# Patient Record
Sex: FEMALE | Race: WHITE | Employment: OTHER | URBAN - METROPOLITAN AREA
[De-identification: names, ages, dates, MRNs, and addresses within clinical notes are randomized per-mention and may not be internally consistent; named-entity substitution may affect disease eponyms.]

---

## 2017-10-11 ENCOUNTER — APPOINTMENT (OUTPATIENT)
Dept: GENERAL RADIOLOGY | Facility: CLINIC | Age: 82
DRG: 605 | End: 2017-10-11
Attending: EMERGENCY MEDICINE
Payer: MEDICARE

## 2017-10-11 ENCOUNTER — HOSPITAL ENCOUNTER (INPATIENT)
Facility: CLINIC | Age: 82
LOS: 3 days | Discharge: NURSING, REHAB OR LONG-TERM CARE | DRG: 605 | End: 2017-10-14
Attending: EMERGENCY MEDICINE | Admitting: HOSPITALIST
Payer: MEDICARE

## 2017-10-11 ENCOUNTER — APPOINTMENT (OUTPATIENT)
Dept: CT IMAGING | Facility: CLINIC | Age: 82
DRG: 605 | End: 2017-10-11
Attending: EMERGENCY MEDICINE
Payer: MEDICARE

## 2017-10-11 ENCOUNTER — APPOINTMENT (OUTPATIENT)
Dept: OCCUPATIONAL THERAPY | Facility: CLINIC | Age: 82
DRG: 605 | End: 2017-10-11
Attending: ORTHOPAEDIC SURGERY
Payer: MEDICARE

## 2017-10-11 ENCOUNTER — APPOINTMENT (OUTPATIENT)
Dept: PHYSICAL THERAPY | Facility: CLINIC | Age: 82
DRG: 605 | End: 2017-10-11
Attending: ORTHOPAEDIC SURGERY
Payer: MEDICARE

## 2017-10-11 DIAGNOSIS — R11.2 NON-INTRACTABLE VOMITING WITH NAUSEA, UNSPECIFIED VOMITING TYPE: ICD-10-CM

## 2017-10-11 DIAGNOSIS — S70.01XA CONTUSION OF RIGHT HIP, INITIAL ENCOUNTER: ICD-10-CM

## 2017-10-11 DIAGNOSIS — M25.559 HIP PAIN: ICD-10-CM
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
## 2017-10-11 DIAGNOSIS — M25.531 RIGHT WRIST PAIN: ICD-10-CM

## 2017-10-11 LAB
ALBUMIN SERPL-MCNC: 3.1 G/DL (ref 3.4–5)
ALP SERPL-CCNC: 49 U/L (ref 40–150)
ALT SERPL W P-5'-P-CCNC: 21 U/L (ref 0–50)
ANION GAP SERPL CALCULATED.3IONS-SCNC: 8 MMOL/L (ref 3–14)
ANION GAP SERPL CALCULATED.3IONS-SCNC: 8 MMOL/L (ref 3–14)
AST SERPL W P-5'-P-CCNC: 22 U/L (ref 0–45)
BASOPHILS # BLD AUTO: 0 10E9/L (ref 0–0.2)
BASOPHILS NFR BLD AUTO: 0.2 %
BILIRUB SERPL-MCNC: 0.4 MG/DL (ref 0.2–1.3)
BUN SERPL-MCNC: 21 MG/DL (ref 7–30)
BUN SERPL-MCNC: 25 MG/DL (ref 7–30)
CALCIUM SERPL-MCNC: 7.6 MG/DL (ref 8.5–10.1)
CALCIUM SERPL-MCNC: 7.9 MG/DL (ref 8.5–10.1)
CHLORIDE SERPL-SCNC: 104 MMOL/L (ref 94–109)
CHLORIDE SERPL-SCNC: 106 MMOL/L (ref 94–109)
CO2 SERPL-SCNC: 25 MMOL/L (ref 20–32)
CO2 SERPL-SCNC: 29 MMOL/L (ref 20–32)
CREAT SERPL-MCNC: 1.19 MG/DL (ref 0.52–1.04)
CREAT SERPL-MCNC: 1.2 MG/DL (ref 0.52–1.04)
DIFFERENTIAL METHOD BLD: ABNORMAL
EOSINOPHIL # BLD AUTO: 0.2 10E9/L (ref 0–0.7)
EOSINOPHIL NFR BLD AUTO: 4.7 %
ERYTHROCYTE [DISTWIDTH] IN BLOOD BY AUTOMATED COUNT: 13.5 % (ref 10–15)
GFR SERPL CREATININE-BSD FRML MDRD: 42 ML/MIN/1.7M2
GFR SERPL CREATININE-BSD FRML MDRD: 43 ML/MIN/1.7M2
GLUCOSE BLDC GLUCOMTR-MCNC: 184 MG/DL (ref 70–99)
GLUCOSE BLDC GLUCOMTR-MCNC: 231 MG/DL (ref 70–99)
GLUCOSE BLDC GLUCOMTR-MCNC: 291 MG/DL (ref 70–99)
GLUCOSE BLDC GLUCOMTR-MCNC: 293 MG/DL (ref 70–99)
GLUCOSE SERPL-MCNC: 148 MG/DL (ref 70–99)
GLUCOSE SERPL-MCNC: 330 MG/DL (ref 70–99)
HCT VFR BLD AUTO: 25.2 % (ref 35–47)
HCT VFR BLD AUTO: 27.6 % (ref 35–47)
HCT VFR BLD AUTO: 28.3 % (ref 35–47)
HCT VFR BLD AUTO: 38.2 % (ref 35–47)
HGB BLD-MCNC: 12.6 G/DL (ref 11.7–15.7)
HGB BLD-MCNC: 8.4 G/DL (ref 11.7–15.7)
HGB BLD-MCNC: 9.1 G/DL (ref 11.7–15.7)
HGB BLD-MCNC: 9.5 G/DL (ref 11.7–15.7)
IMM GRANULOCYTES # BLD: 0 10E9/L (ref 0–0.4)
IMM GRANULOCYTES NFR BLD: 0.2 %
INTERPRETATION ECG - MUSE: NORMAL
LIPASE SERPL-CCNC: 131 U/L (ref 73–393)
LYMPHOCYTES # BLD AUTO: 0.9 10E9/L (ref 0.8–5.3)
LYMPHOCYTES NFR BLD AUTO: 18.4 %
MCH RBC QN AUTO: 28.8 PG (ref 26.5–33)
MCHC RBC AUTO-ENTMCNC: 33 G/DL (ref 31.5–36.5)
MCV RBC AUTO: 87 FL (ref 78–100)
MONOCYTES # BLD AUTO: 0.6 10E9/L (ref 0–1.3)
MONOCYTES NFR BLD AUTO: 12.9 %
NEUTROPHILS # BLD AUTO: 3.1 10E9/L (ref 1.6–8.3)
NEUTROPHILS NFR BLD AUTO: 63.6 %
NRBC # BLD AUTO: 0 10*3/UL
NRBC BLD AUTO-RTO: 0 /100
PLATELET # BLD AUTO: 135 10E9/L (ref 150–450)
POTASSIUM SERPL-SCNC: 3.8 MMOL/L (ref 3.4–5.3)
POTASSIUM SERPL-SCNC: 4.6 MMOL/L (ref 3.4–5.3)
PROT SERPL-MCNC: 6 G/DL (ref 6.8–8.8)
RBC # BLD AUTO: 4.37 10E12/L (ref 3.8–5.2)
SODIUM SERPL-SCNC: 139 MMOL/L (ref 133–144)
SODIUM SERPL-SCNC: 141 MMOL/L (ref 133–144)
TROPONIN I SERPL-MCNC: <0.015 UG/L (ref 0–0.04)
WBC # BLD AUTO: 4.9 10E9/L (ref 4–11)

## 2017-10-11 PROCEDURE — 40000193 ZZH STATISTIC PT WARD VISIT: Performed by: PHYSICAL THERAPIST

## 2017-10-11 PROCEDURE — 83690 ASSAY OF LIPASE: CPT | Performed by: EMERGENCY MEDICINE

## 2017-10-11 PROCEDURE — A9270 NON-COVERED ITEM OR SERVICE: HCPCS | Mod: GY | Performed by: HOSPITALIST

## 2017-10-11 PROCEDURE — 99207 ZZC CDG-HISTORY COMP: MEETS EXP. PROBLEM FOCUSED-DOWN CODED LACK OF ROS: CPT | Performed by: HOSPITALIST

## 2017-10-11 PROCEDURE — 25000125 ZZHC RX 250: Performed by: HOSPITALIST

## 2017-10-11 PROCEDURE — 99221 1ST HOSP IP/OBS SF/LOW 40: CPT | Mod: AI | Performed by: HOSPITALIST

## 2017-10-11 PROCEDURE — 86900 BLOOD TYPING SEROLOGIC ABO: CPT | Performed by: HOSPITALIST

## 2017-10-11 PROCEDURE — 85014 HEMATOCRIT: CPT | Performed by: HOSPITALIST

## 2017-10-11 PROCEDURE — 80048 BASIC METABOLIC PNL TOTAL CA: CPT | Performed by: FAMILY MEDICINE

## 2017-10-11 PROCEDURE — 93005 ELECTROCARDIOGRAM TRACING: CPT

## 2017-10-11 PROCEDURE — 97166 OT EVAL MOD COMPLEX 45 MIN: CPT | Mod: GO

## 2017-10-11 PROCEDURE — 70450 CT HEAD/BRAIN W/O DYE: CPT

## 2017-10-11 PROCEDURE — 97535 SELF CARE MNGMENT TRAINING: CPT | Mod: GO

## 2017-10-11 PROCEDURE — 25000128 H RX IP 250 OP 636: Performed by: HOSPITALIST

## 2017-10-11 PROCEDURE — 73700 CT LOWER EXTREMITY W/O DYE: CPT | Mod: RT

## 2017-10-11 PROCEDURE — 96376 TX/PRO/DX INJ SAME DRUG ADON: CPT

## 2017-10-11 PROCEDURE — 86850 RBC ANTIBODY SCREEN: CPT | Performed by: HOSPITALIST

## 2017-10-11 PROCEDURE — 40000133 ZZH STATISTIC OT WARD VISIT

## 2017-10-11 PROCEDURE — 93010 ELECTROCARDIOGRAM REPORT: CPT | Performed by: INTERNAL MEDICINE

## 2017-10-11 PROCEDURE — 00000146 ZZHCL STATISTIC GLUCOSE BY METER IP

## 2017-10-11 PROCEDURE — 25000128 H RX IP 250 OP 636: Performed by: EMERGENCY MEDICINE

## 2017-10-11 PROCEDURE — 96374 THER/PROPH/DIAG INJ IV PUSH: CPT

## 2017-10-11 PROCEDURE — 85018 HEMOGLOBIN: CPT | Performed by: HOSPITALIST

## 2017-10-11 PROCEDURE — 25000131 ZZH RX MED GY IP 250 OP 636 PS 637: Mod: GY | Performed by: HOSPITALIST

## 2017-10-11 PROCEDURE — 25000132 ZZH RX MED GY IP 250 OP 250 PS 637: Mod: GY | Performed by: HOSPITALIST

## 2017-10-11 PROCEDURE — 85025 COMPLETE CBC W/AUTO DIFF WBC: CPT | Performed by: EMERGENCY MEDICINE

## 2017-10-11 PROCEDURE — 73502 X-RAY EXAM HIP UNI 2-3 VIEWS: CPT

## 2017-10-11 PROCEDURE — 86923 COMPATIBILITY TEST ELECTRIC: CPT | Performed by: HOSPITALIST

## 2017-10-11 PROCEDURE — 25000128 H RX IP 250 OP 636: Performed by: INTERNAL MEDICINE

## 2017-10-11 PROCEDURE — 12000007 ZZH R&B INTERMEDIATE

## 2017-10-11 PROCEDURE — 36415 COLL VENOUS BLD VENIPUNCTURE: CPT | Performed by: HOSPITALIST

## 2017-10-11 PROCEDURE — 80053 COMPREHEN METABOLIC PANEL: CPT | Performed by: EMERGENCY MEDICINE

## 2017-10-11 PROCEDURE — 40000141 ZZH STATISTIC PERIPHERAL IV START W/O US GUIDANCE

## 2017-10-11 PROCEDURE — 96375 TX/PRO/DX INJ NEW DRUG ADDON: CPT

## 2017-10-11 PROCEDURE — 97530 THERAPEUTIC ACTIVITIES: CPT | Mod: GO

## 2017-10-11 PROCEDURE — 84484 ASSAY OF TROPONIN QUANT: CPT | Performed by: EMERGENCY MEDICINE

## 2017-10-11 PROCEDURE — 97530 THERAPEUTIC ACTIVITIES: CPT | Mod: GP | Performed by: PHYSICAL THERAPIST

## 2017-10-11 PROCEDURE — 99285 EMERGENCY DEPT VISIT HI MDM: CPT | Mod: 25

## 2017-10-11 PROCEDURE — 99207 ZZC APP CREDIT; MD BILLING SHARED VISIT: CPT | Performed by: HOSPITALIST

## 2017-10-11 PROCEDURE — 96361 HYDRATE IV INFUSION ADD-ON: CPT

## 2017-10-11 PROCEDURE — 97161 PT EVAL LOW COMPLEX 20 MIN: CPT | Mod: GP | Performed by: PHYSICAL THERAPIST

## 2017-10-11 PROCEDURE — 86901 BLOOD TYPING SEROLOGIC RH(D): CPT | Performed by: HOSPITALIST

## 2017-10-11 PROCEDURE — 73110 X-RAY EXAM OF WRIST: CPT | Mod: RT

## 2017-10-11 RX ORDER — NALOXONE HYDROCHLORIDE 0.4 MG/ML
.1-.4 INJECTION, SOLUTION INTRAMUSCULAR; INTRAVENOUS; SUBCUTANEOUS
Status: DISCONTINUED | OUTPATIENT
Start: 2017-10-11 | End: 2017-10-14 | Stop reason: HOSPADM

## 2017-10-11 RX ORDER — POTASSIUM CHLORIDE 1500 MG/1
20-40 TABLET, EXTENDED RELEASE ORAL
Status: DISCONTINUED | OUTPATIENT
Start: 2017-10-11 | End: 2017-10-14 | Stop reason: HOSPADM

## 2017-10-11 RX ORDER — DEXTROSE MONOHYDRATE 25 G/50ML
25-50 INJECTION, SOLUTION INTRAVENOUS
Status: DISCONTINUED | OUTPATIENT
Start: 2017-10-11 | End: 2017-10-12

## 2017-10-11 RX ORDER — ONDANSETRON 2 MG/ML
4 INJECTION INTRAMUSCULAR; INTRAVENOUS ONCE
Status: COMPLETED | OUTPATIENT
Start: 2017-10-11 | End: 2017-10-11

## 2017-10-11 RX ORDER — NICOTINE POLACRILEX 4 MG
15-30 LOZENGE BUCCAL
Status: DISCONTINUED | OUTPATIENT
Start: 2017-10-11 | End: 2017-10-12

## 2017-10-11 RX ORDER — ACETAMINOPHEN 325 MG/1
650 TABLET ORAL EVERY 4 HOURS PRN
Status: DISCONTINUED | OUTPATIENT
Start: 2017-10-11 | End: 2017-10-14 | Stop reason: HOSPADM

## 2017-10-11 RX ORDER — POLYETHYLENE GLYCOL 3350 17 G/17G
17 POWDER, FOR SOLUTION ORAL DAILY PRN
Status: DISCONTINUED | OUTPATIENT
Start: 2017-10-11 | End: 2017-10-14 | Stop reason: HOSPADM

## 2017-10-11 RX ORDER — ACETAMINOPHEN 650 MG/1
650 SUPPOSITORY RECTAL EVERY 4 HOURS PRN
Status: DISCONTINUED | OUTPATIENT
Start: 2017-10-11 | End: 2017-10-14 | Stop reason: HOSPADM

## 2017-10-11 RX ORDER — CARBOXYMETHYLCELLULOSE SODIUM 5 MG/ML
1 SOLUTION/ DROPS OPHTHALMIC 2 TIMES DAILY
Status: DISCONTINUED | OUTPATIENT
Start: 2017-10-11 | End: 2017-10-14 | Stop reason: HOSPADM

## 2017-10-11 RX ORDER — LANOLIN ALCOHOL/MO/W.PET/CERES
1000 CREAM (GRAM) TOPICAL
Status: DISCONTINUED | OUTPATIENT
Start: 2017-10-11 | End: 2017-10-14 | Stop reason: HOSPADM

## 2017-10-11 RX ORDER — MORPHINE SULFATE 4 MG/ML
4 INJECTION, SOLUTION INTRAMUSCULAR; INTRAVENOUS
Status: DISCONTINUED | OUTPATIENT
Start: 2017-10-11 | End: 2017-10-11

## 2017-10-11 RX ORDER — PROCHLORPERAZINE MALEATE 5 MG
5 TABLET ORAL EVERY 6 HOURS PRN
Status: DISCONTINUED | OUTPATIENT
Start: 2017-10-11 | End: 2017-10-14 | Stop reason: HOSPADM

## 2017-10-11 RX ORDER — POTASSIUM CHLORIDE 7.45 MG/ML
10 INJECTION INTRAVENOUS
Status: DISCONTINUED | OUTPATIENT
Start: 2017-10-11 | End: 2017-10-14 | Stop reason: HOSPADM

## 2017-10-11 RX ORDER — ONDANSETRON 4 MG/1
4 TABLET, ORALLY DISINTEGRATING ORAL EVERY 6 HOURS PRN
Status: DISCONTINUED | OUTPATIENT
Start: 2017-10-11 | End: 2017-10-14 | Stop reason: HOSPADM

## 2017-10-11 RX ORDER — MULTIPLE VITAMINS W/ MINERALS TAB 9MG-400MCG
1 TAB ORAL
Status: DISCONTINUED | OUTPATIENT
Start: 2017-10-12 | End: 2017-10-14 | Stop reason: HOSPADM

## 2017-10-11 RX ORDER — POTASSIUM CL/LIDO/0.9 % NACL 10MEQ/0.1L
10 INTRAVENOUS SOLUTION, PIGGYBACK (ML) INTRAVENOUS
Status: DISCONTINUED | OUTPATIENT
Start: 2017-10-11 | End: 2017-10-14 | Stop reason: HOSPADM

## 2017-10-11 RX ORDER — ASPIRIN 81 MG/1
81 TABLET ORAL EVERY EVENING
Status: DISCONTINUED | OUTPATIENT
Start: 2017-10-11 | End: 2017-10-13

## 2017-10-11 RX ORDER — ALBUTEROL SULFATE 0.83 MG/ML
2.5 SOLUTION RESPIRATORY (INHALATION)
Status: DISCONTINUED | OUTPATIENT
Start: 2017-10-11 | End: 2017-10-14 | Stop reason: HOSPADM

## 2017-10-11 RX ORDER — DEXTROSE MONOHYDRATE 25 G/50ML
25-50 INJECTION, SOLUTION INTRAVENOUS
Status: DISCONTINUED | OUTPATIENT
Start: 2017-10-11 | End: 2017-10-11

## 2017-10-11 RX ORDER — PROCHLORPERAZINE 25 MG
12.5 SUPPOSITORY, RECTAL RECTAL EVERY 12 HOURS PRN
Status: DISCONTINUED | OUTPATIENT
Start: 2017-10-11 | End: 2017-10-14 | Stop reason: HOSPADM

## 2017-10-11 RX ORDER — HYDROMORPHONE HYDROCHLORIDE 1 MG/ML
0.2 INJECTION, SOLUTION INTRAMUSCULAR; INTRAVENOUS; SUBCUTANEOUS
Status: DISCONTINUED | OUTPATIENT
Start: 2017-10-11 | End: 2017-10-14 | Stop reason: HOSPADM

## 2017-10-11 RX ORDER — BRIMONIDINE TARTRATE 1.5 MG/ML
1 SOLUTION/ DROPS OPHTHALMIC 2 TIMES DAILY
Status: ON HOLD | COMMUNITY
End: 2021-07-27

## 2017-10-11 RX ORDER — NICOTINE POLACRILEX 4 MG
15-30 LOZENGE BUCCAL
Status: DISCONTINUED | OUTPATIENT
Start: 2017-10-11 | End: 2017-10-11

## 2017-10-11 RX ORDER — BRIMONIDINE TARTRATE 2 MG/ML
1 SOLUTION/ DROPS OPHTHALMIC 2 TIMES DAILY
Status: DISCONTINUED | OUTPATIENT
Start: 2017-10-11 | End: 2017-10-14 | Stop reason: HOSPADM

## 2017-10-11 RX ORDER — BISACODYL 10 MG
10 SUPPOSITORY, RECTAL RECTAL DAILY PRN
Status: DISCONTINUED | OUTPATIENT
Start: 2017-10-11 | End: 2017-10-14 | Stop reason: HOSPADM

## 2017-10-11 RX ORDER — ALLOPURINOL 100 MG/1
100 TABLET ORAL DAILY
Status: DISCONTINUED | OUTPATIENT
Start: 2017-10-11 | End: 2017-10-14 | Stop reason: HOSPADM

## 2017-10-11 RX ORDER — SODIUM CHLORIDE 9 MG/ML
INJECTION, SOLUTION INTRAVENOUS CONTINUOUS
Status: DISCONTINUED | OUTPATIENT
Start: 2017-10-11 | End: 2017-10-13

## 2017-10-11 RX ORDER — POTASSIUM CHLORIDE 29.8 MG/ML
20 INJECTION INTRAVENOUS
Status: DISCONTINUED | OUTPATIENT
Start: 2017-10-11 | End: 2017-10-14 | Stop reason: HOSPADM

## 2017-10-11 RX ORDER — ONDANSETRON 2 MG/ML
4 INJECTION INTRAMUSCULAR; INTRAVENOUS EVERY 6 HOURS PRN
Status: DISCONTINUED | OUTPATIENT
Start: 2017-10-11 | End: 2017-10-14 | Stop reason: HOSPADM

## 2017-10-11 RX ORDER — POTASSIUM CHLORIDE 1.5 G/1.58G
20-40 POWDER, FOR SOLUTION ORAL
Status: DISCONTINUED | OUTPATIENT
Start: 2017-10-11 | End: 2017-10-14 | Stop reason: HOSPADM

## 2017-10-11 RX ORDER — ROSUVASTATIN CALCIUM 20 MG/1
20 TABLET, COATED ORAL EVERY EVENING
Status: DISCONTINUED | OUTPATIENT
Start: 2017-10-11 | End: 2017-10-14 | Stop reason: HOSPADM

## 2017-10-11 RX ADMIN — MORPHINE SULFATE 4 MG: 4 INJECTION, SOLUTION INTRAMUSCULAR; INTRAVENOUS at 02:58

## 2017-10-11 RX ADMIN — ONDANSETRON 4 MG: 4 TABLET, ORALLY DISINTEGRATING ORAL at 16:37

## 2017-10-11 RX ADMIN — ONDANSETRON 4 MG: 2 SOLUTION INTRAMUSCULAR; INTRAVENOUS at 02:55

## 2017-10-11 RX ADMIN — ACETAMINOPHEN 650 MG: 325 TABLET, FILM COATED ORAL at 16:10

## 2017-10-11 RX ADMIN — INSULIN ASPART 4 UNITS: 100 INJECTION, SOLUTION INTRAVENOUS; SUBCUTANEOUS at 11:01

## 2017-10-11 RX ADMIN — SODIUM CHLORIDE 500 ML: 9 INJECTION, SOLUTION INTRAVENOUS at 03:01

## 2017-10-11 RX ADMIN — SODIUM CHLORIDE 100 ML/HR: 9 INJECTION, SOLUTION INTRAVENOUS at 08:58

## 2017-10-11 RX ADMIN — CYANOCOBALAMIN TAB 1000 MCG 1000 MCG: 1000 TAB at 16:10

## 2017-10-11 RX ADMIN — INSULIN GLARGINE 5 UNITS: 100 INJECTION, SOLUTION SUBCUTANEOUS at 11:01

## 2017-10-11 RX ADMIN — ONDANSETRON 4 MG: 2 SOLUTION INTRAMUSCULAR; INTRAVENOUS at 03:45

## 2017-10-11 RX ADMIN — ONDANSETRON 4 MG: 2 SOLUTION INTRAMUSCULAR; INTRAVENOUS at 22:28

## 2017-10-11 RX ADMIN — ALLOPURINOL 100 MG: 100 TABLET ORAL at 16:10

## 2017-10-11 RX ADMIN — PROCHLORPERAZINE EDISYLATE 5 MG: 5 INJECTION INTRAMUSCULAR; INTRAVENOUS at 22:43

## 2017-10-11 ASSESSMENT — ACTIVITIES OF DAILY LIVING (ADL): PREVIOUS_RESPONSIBILITIES: MEAL PREP;MEDICATION MANAGEMENT

## 2017-10-11 ASSESSMENT — ENCOUNTER SYMPTOMS
BACK PAIN: 0
SHORTNESS OF BREATH: 0
HEADACHES: 0
COUGH: 0
NECK PAIN: 0
SORE THROAT: 0
NUMBNESS: 0
WEAKNESS: 0
FEVER: 0

## 2017-10-11 NOTE — PHARMACY-ADMISSION MEDICATION HISTORY
Admission medication history interview status for the 10/11/2017  admission is complete. See EPIC admission navigator for prior to admission medications     Medication history source reliability:Good    Actions taken by pharmacist (provider contacted, etc): called daughter who sets up her meds, had a list     Additional medication history information not noted on PTA med list :None    Medication reconciliation/reorder completed by provider prior to medication history? No    Time spent in this activity: 20 min    Prior to Admission medications    Medication Sig Last Dose Taking? Auth Provider   FLUOXETINE HCL PO Take 20 mg by mouth 2 times daily 10/10/2017 at am Yes Unknown, Entered By History   Linagliptin (TRADJENTA PO) Take 2.5 mg by mouth daily 10/9/2017 at pm Yes Unknown, Entered By History   brimonidine (ALPHAGAN-P) 0.15 % ophthalmic solution Place 1 drop Into the left eye 2 times daily 10/10/2017 at am Yes Unknown, Entered By History   Carboxymethylcellul-Glycerin (REFRESH OPTIVE) 1-0.9 % GEL Apply to eye 2 times daily Gel to left eye 10/10/2017 at am Yes Unknown, Entered By History   Polyethylene Glycol 400 (BLINK TEARS OP) Apply 1 drop to eye as needed (to both eye as needed)  at prn Yes Unknown, Entered By History   LISINOPRIL PO Take 15 mg by mouth daily 10/9/2017 at noon Yes Unknown, Entered By History   ASPIRIN EC PO Take 81 mg by mouth daily 10/9/2017 at pm Yes Reported, Patient   ALLOPURINOL PO Take 100 mg by mouth daily 10/10/2017 at am Yes Reported, Patient   Rosuvastatin Calcium (CRESTOR PO) Take 20 mg by mouth daily 10/9/2017 at pm Yes Reported, Patient   OMEPRAZOLE PO Take 20 mg by mouth every morning 10/9/2017 at noon Yes Reported, Patient   Multiple Vitamins-Minerals (CENTRUM SILVER) per tablet Take 1 tablet by mouth daily 10/9/2017 at noon Yes Reported, Patient   calcium-vitamin D (CALTRATE) 600-400 MG-UNIT per tablet Take 1 tablet by mouth 2 times daily 10/10/2017 at am Yes Reported, Patient    Cyanocobalamin (VITAMIN B 12 PO) Take 1,000 mcg by mouth daily 10/9/2017 at noon Yes Reported, Patient   Cholecalciferol (VITAMIN D3 PO) Take 2,000 Units by mouth daily 10/9/2017 at pm  Reported, Patient

## 2017-10-11 NOTE — IP AVS SNAPSHOT
[address] [facility]    [address] [place] [address]    Phone:  [phone]                                       After Visit Summary   10/11/2017    Eri Puckett    MRN: [id number]           After Visit Summary Signature Page     I have received my discharge instructions, and my questions have been answered. I have discussed any challenges I see with this plan with the nurse or doctor.    ..........................................................................................................................................  Patient/Patient Representative Signature      ..........................................................................................................................................  Patient Representative Print Name and Relationship to Patient    ..................................................               ................................................  Date                                            Time    ..........................................................................................................................................  Reviewed by Signature/Title    ...................................................              ..............................................  Date                                                            Time

## 2017-10-11 NOTE — IP AVS SNAPSHOT
` ` Patient Information     Patient Name Sex     Eri Puckett ([id number]) Female 1928       Room Bed    5500 5502-53      Patient Demographics     Address Phone    [address] [place]   [place] [address] [phone] (Home)      Patient Ethnicity & Race     Ethnic Group Patient Race    American White      Emergency Contact(s)     Name Relation Home Work Mobile    Blanca Cuevas [phone]      Documents on File        Status Date Received Description       Documents for the Patient    Insurance Card  07     Face Sheet  () 07     External Medication Information Consent       Patient ID Received 13     Consent for Services - Hospital/Clinic Received () 10/22/11     Consent for Services - Hospital/Clinic  ()      Privacy Notice - [place] Received 10/22/11     Consent for EHR Access  13 Copied from existing Consent for services - C/HOD collected on 10/22/2011    [facility] Specified Other       Consent for Services - Hospital/Clinic Received () 10/03/13     Insurance Card Received 13     Insurance Card Received 06/03/15     Consent for Services - Hospital/Clinic Received () 06/03/15     Consent for Services/Privacy Notice - Hospital/Clinic Received 10/11/17     Privacy Notice - [place]  (Deleted) 07     Consent for Services - Hospital/Clinic Received (Deleted) 06/03/15     Consent for Services - Hospital/Clinic Received (Deleted) 06/03/15        Documents for the Encounter    CMS IM for Patient Signature       EMS/Ambulance Record  10/11/17 [facility]      Admission Information     Attending Provider Admitting Provider Admission Type Admission Date/Time    Nathaniel Ibarra MD Kharel, Tirtha R, MD Emergency 10/11/17  0020    Discharge Date Hospital Service Auth/Cert Status Service Area     Hospitalist [facility] SERVICES    Unit Room/Bed Admission Status       [facility]  ORTHO SPEC UNIT 5502/5502-02 Admission (Confirmed)       Admission     Complaint    Fall      Hospital Account     Name Acct ID Class Status Primary Coverage    Eri Puckett [id number] Inpatient Open MEDICARE - MEDICARE FOR HB SUPPLEMENT            Guarantor Account (for Hospital Account #[id number])     Name Relation to Pt Service Area Active? Acct Type    Eri Puckett  FCS Yes Personal/Family    Address Phone          [address] [place] [address] [phone](H)              Coverage Information (for Hospital Account #[id number])     1. MEDICARE/MEDICARE FOR HB SUPPLEMENT     F/O Payor/Plan Precert #    MEDICARE/MEDICARE FOR HB SUPPLEMENT     Subscriber Subscriber #    Eri Puckett [id number]    Address Phone    [employer] CLAIM[employer]  [address]  [place] [address] [phone]          2. [employer]ERS/[employer] FREEDOM PLAN     F/O Payor/Plan Precert #    [employer] FREEDOM PLAN     Subscriber Subscriber #    Eri Puckett [id number]    Address Phone    [address]  [place] [address] [phone]

## 2017-10-11 NOTE — PLAN OF CARE
"Problem: Patient Care Overview  Goal: Plan of Care/Patient Progress Review  PT: PT orders received, evaluation completed, treatment initiated. Pt is an 89yo female admitted after fall, found to have R distal radius impacted fracture and R hip subcutaneous hematoma. Per ortho note, pt can be WBAT on RUE in splint and ambulate as tolerated with assistance. Pt lives alone in a senior apartment, elevator access. Pt reports she is IND for functional mobility without AD at baseline, later states she \"sometimes\" uses her FWW. Pt's family assists with cleaning, medications, transportation; pt uses facility transport to do her own grocery shopping.     Discharge Planner PT   Patient plan for discharge: None stated  Current status: Pt denies pain at rest. Pt transfers supine<>sit with Trice and HOB elevated. Pt transfers sit<>stand to FWW with Trice. Pt transfers bed<>chair with FWW and Trice, reports RLE pain 4/10. Session limited as pt becoming diaphoretic, pale, and lightheaded after standing from commode; immediately returned to supine with Ax2; supine /48, HR 92. Symptoms improved once supine. RN updated.   Barriers to return to prior living situation: Current level of assist, pain, weakness, lives alone  Recommendations for discharge: TCU  Rationale for recommendations: With limited session, appears pt would benefit from TCU prior to returning home alone. Pt resistant to TCU, states she may be able to stay with her daughter or have a family member come and stay with her.        Entered by: Vonda Haley 10/11/2017 12:58 PM             "

## 2017-10-11 NOTE — PROGRESS NOTES
"[facility]  Hospitalist Progress Note        Celso Oliveira, DO  10/11/2017        Interval History:      Patient with right hip pain reported. Discussed with patient and nursing staff.          Assessment and Plan:        89-year-old woman with the above-mentioned medical problems, who was brought to the ER after a fall causing right hip and right wrist pain.     Right radial wrist fracture, and also right thigh hematoma due to mechanical fall: The patient reports she had a mechanical fall. x-rays of her right hip and pelvis as well as right wrist were completed.  Clinically, she also seems to have a right thigh hematoma.   - consult Orthopedic Surgery   - Pain control.    Dry heaving and vomiting:  Likely due to pain, which has improved:    - p.r.n. antiemetic, IV hydration, and follow electrolytes.     Hypertension and hyperlipidemia:  Prior to admission, she was on lisinopril and Crestor.    - lisinopril with holding parameters.    - hold Crestor for now    Diabetes mellitus, type 2:  Per our records, she is on glyburide and sitagliptin, medications to be verified.    - low-dose Lantus 5 units subq daily  - medium intensity sliding scale.      Dysthymia: Stable.   - Continue fluoxetine.      Mild thrombocytopenia, unknown baseline:  Could be due to hematoma.    - Hold aspirin    Gout:    - Continue allopurinol     Mild intermittent asthma:  p.r.n. albuterol     Elevated creatinine, unknown baseline:  Unclear if this represents acute kidney injury versus chronic kidney disease stage III.   - Monitor.     DVT prophylaxis:  PCDs     Gastroesophageal reflux disease and gastrointestinal prophylaxis: Continue omeprazole.     CODE: FULL CODE.       Disposition: Likely discharge on 10/12 if hemoglobin stable.                    Physical Exam:           Blood pressure 133/61, pulse 94, temperature 97.8  F (36.6  C), temperature source Oral, resp. rate 18, height 1.499 m (4' 11\"), weight 72.6 kg " (160 lb), SpO2 99 %.    Vitals:    10/11/17 0022   Weight: 72.6 kg (160 lb)       Vital Sign Ranges  Temperature Temp  Av  F (36.7  C)  Min: 97.8  F (36.6  C)  Max: 98.3  F (36.8  C)   Blood pressure Systolic (24hrs), Av , Min:120 , Max:154        Diastolic (24hrs), Av, Min:61, Max:83      Pulse Pulse  Av.9  Min: 59  Max: 94   Respirations Resp  Av  Min: 18  Max: 18   Pulse oximetry SpO2  Av.3 %  Min: 94 %  Max: 100 %     Vital Signs with Ranges  Temp:  [97.8  F (36.6  C)-98.3  F (36.8  C)] 97.8  F (36.6  C)  Pulse:  [59-94] 94  Resp:  [18] 18  BP: (120-154)/(61-83) 133/61  SpO2:  [94 %-100 %] 99 %    I/O Last 3 Shifts:        I/O past 24 hours:     Intake/Output Summary (Last 24 hours) at 10/11/17 0815  Last data filed at 10/11/17 0800   Gross per 24 hour   Intake              100 ml   Output                0 ml   Net              100 ml     GENERAL: Alert and oriented. NAD. Conversational, appropriate.   HEENT: Normocephalic. EOMI. No icterus or injection. Nares normal.   LUNGS: Clear to auscultation. No dyspnea at rest.   HEART: Regular rate. Extremities perfused.   ABDOMEN: Soft, nontender, and nondistended. Positive bowel sounds.   EXTREMITIES: No LE edema noted. Wrist in brace, distal cms in tact. Ecchymosis to right thigh, stable.   NEUROLOGIC: Moves extremities x4 on command. No acute focal neurologic abnormalities noted.          Prior to Admission Medications:        Prescriptions Prior to Admission   Medication Sig Dispense Refill Last Dose     ASPIRIN EC PO Take 81 mg by mouth daily   Past Week at Unknown     ALLOPURINOL PO Take 100 mg by mouth daily   2013 at 700     Rosuvastatin Calcium (CRESTOR PO) Take 20 mg by mouth daily        OMEPRAZOLE PO Take 20 mg by mouth every morning   2013 at Unknown     Multiple Vitamins-Minerals (CENTRUM SILVER) per tablet Take 1 tablet by mouth daily   2013 at Unknown     SitaGLIPtin Phosphate (JANUVIA PO) Take 25 mg by  mouth daily   11/25/2013 at Unknown     calcium-vitamin D (CALTRATE) 600-400 MG-UNIT per tablet Take 1 tablet by mouth 2 times daily   11/26/2013 at Unknown     Cyanocobalamin (VITAMIN B 12 PO) Take 1,000 mcg by mouth daily   11/25/2013 at Unknown     Cholecalciferol (VITAMIN D3 PO) Take 2,000 Units by mouth daily   11/25/2013 at Unknown     FLUOXETINE HCL 10 MG OR CAPS one tablet whice a day   11/26/2013 at 700     GLYBURIDE 5 MG OR TABS 10 mg tablet   11/26/2013 at 700     ROBITUSSIN A-C  MG/5ML OR SYRP 1-2 tsp PO QHS PRN cough 4 oz 0 10/31/2013 at Unknown            Medications:        Current Facility-Administered Medications   Medication Last Rate     NaCl       Current Facility-Administered Medications   Medication Dose Route Frequency     insulin aspart  1-6 Units Subcutaneous Q4H     insulin glargine  5 Units Subcutaneous QAM AC     Current Facility-Administered Medications   Medication Dose Route Frequency     glucose  15-30 g Oral Q15 Min PRN    Or     dextrose  25-50 mL Intravenous Q15 Min PRN    Or     glucagon  1 mg Subcutaneous Q15 Min PRN     naloxone  0.1-0.4 mg Intravenous Q2 Min PRN     potassium chloride  20-40 mEq Oral Q2H PRN     potassium chloride  20-40 mEq Oral or Feeding Tube Q2H PRN     potassium chloride  10 mEq Intravenous Q1H PRN     potassium chloride with lidocaine  10 mEq Intravenous Q1H PRN     potassium chloride  20 mEq Intravenous Q1H PRN     acetaminophen  650 mg Oral Q4H PRN     acetaminophen  650 mg Rectal Q4H PRN     HYDROmorphone  0.2 mg Intravenous Q2H PRN     polyethylene glycol  17 g Oral Daily PRN     bisacodyl  10 mg Rectal Daily PRN     ondansetron  4 mg Oral Q6H PRN    Or     ondansetron  4 mg Intravenous Q6H PRN     albuterol  2.5 mg Nebulization Q2H PRN            Data:      Lab data reviewed.     Recent Labs  Lab 10/11/17  0030   HGB 12.6   MCV 87   *      POTASSIUM 3.8   CHLORIDE 104   CO2 29   BUN 21   CR 1.20*   ANIONGAP 8   GARRETT 7.9*   *    TROPI <0.015           Imaging:      Imaging data reviewed.     Dr. Celso Oliveira D.O.  [facility]  Pager [phone]

## 2017-10-11 NOTE — PROVIDER NOTIFICATION
Pt complaint of tight cast around thumb.  Able to wiggle thumb; quick cap refill.  Swelling and pain present.  Denies numbness/tingling currently.

## 2017-10-11 NOTE — PLAN OF CARE
Problem: Patient Care Overview  Goal: Plan of Care/Patient Progress Review  Outcome: No Change  Patient Ax4 but forgetful.  Denies pain except on movement with right hip.  Large hematoma on back of left upper thigh.  Purple and ecchymotic.  Right wrist splinted, +2 edema in hand.  Patient unable to tolerate being up for very long without becoming symptomatic, sweating, feeling weak.  Orthostatic BP + this morning.  Up to BSC with 1-2 assist and gait belt.  Voided small amount, incontinent.  Lungs clear.  Tele SR with 1st degree AV block, tachy at times.  Tolerated small meal with no c/o nausea.

## 2017-10-11 NOTE — ED PROVIDER NOTES
"  History     Chief Complaint:  Hip pain post-fall    HPI     Available HPI obtained from patient, EMS and chart review.    Eri Puckett is a 89 year old female with DM type 2 with peripheral neuropathy, HTN, dyslipidemia, lymphoma in remission, s/p TKA, who presents by EMS accompanied by family, with hip pain following a mechanical fall.     She states she was organizing her closet, when she stepped on \"one of those metal things by the door\", lost her balance and felt backwards. She reports she hit the back of her head, but did not lose consciousness and does not have amnesia of the episode. She was able to ambulate without assistance.    Upon EMS arrival, patient had an episode of emesis, so they decided to transport her to the ED.   At the ED she complains of right wrist pain, but no leg pain, weakness, numbness or tingling. No current headache, back or neck pain, chest pain or SOB. She denies recent cough, fever, sore throat or other complaints.   Patient takes low-dose ASA, but no anticoagulants.     Allergies:  Cephalexin      Medications:    ASA  Allopurinol   Rosuvastatin  Omeprazole   Fluoxetine  Linagliptin  Lisinopril  Rituximab   Vitamins    Past Medical History:    Asthma, mild intermittent  DM type 2  HTN  Dyslipidemia  Dysthymia  Lymphoma in remission (2007)  BCC, nose, s/p Mohs procedure   Peripheral neuropathy   Degenerative arthritis of C-spine  Gout  OA of knee  Glaucoma   Aphakia OS    Past Surgical History:    Mohs procedure  TKA  Cholecystectomy  Breast biopsy  Cataract surgery and vitrectomy, OS    Family History:    Lung cancer in nonsmoker  DM  Glaucoma  Breast cancer     Social History:  Marital Status:    Smoking status: negative   Alcohol status: negative   Patient presents via EMS, accompanied by family.  PCP: Kylee Liang      Review of Systems   Constitutional: Negative for fever.   HENT: Negative for sore throat.    Respiratory: Negative for cough and shortness of " "breath.    Cardiovascular: Negative for chest pain.   Musculoskeletal: Negative for back pain and neck pain.        Positive for right wrist and hip pain    Neurological: Negative for weakness, numbness and headaches.        Positive for head trauma, negative for LOC or amnesia of the episode.   All other systems reviewed and are negative.      Physical Exam     Patient Vitals for the past 24 hrs:   BP Temp Temp src Pulse Resp SpO2 Height Weight   10/11/17 0231 - - - 75 18 96 % - -   10/11/17 0230 154/67 - - - - - - -   10/11/17 0215 151/68 - - 66 - 94 % - -   10/11/17 0200 142/64 - - 63 - 96 % - -   10/11/17 0145 140/72 - - 63 - 96 % - -   10/11/17 0131 - - - 61 18 95 % - -   10/11/17 0130 135/61 - - - - - - -   10/11/17 0045 - - - 59 - 97 % - -   10/11/17 0030 - - - 59 - 97 % - -   10/11/17 0022 121/83 98.3  F (36.8  C) Oral 62 18 96 % 1.499 m (4' 11\") 72.6 kg (160 lb)       Physical Exam  Constitutional: Appears well-developed and well-nourished. Cooperative.    HENT:   Head: Atraumatic.   Mouth/Throat: Oropharynx is without erythema or exudate and mucous membranes are moist.   Eyes: Conjunctivae normal and EOM are normal. Pupils are equal, round, and reactive to light.   Neck: Normal range of motion. Neck supple.   Cardiovascular: Normal rate, regular rhythm, normal heart sounds and radial and dorsalis pedis pulses are 2+ and symmetric.   Pulmonary/Chest: Effort normal and breath sounds normal.   Abdominal: Soft. Bowel sounds are normal. No splenomegaly or hepatomegaly. No tenderness. No rebound.  Musculoskeletal: No midline tenderness on neck or back. RUE: There are 2 bruises on right forearm, no bony tenderness on elbow, wrist or hand. Full ROM. RLE: There is tenderness over right greater trochanter. Pelvis stable, nontender. LLE: there is ecchymosis over left 3rd toe, no bony tenderness, normal ROM. Capillary refill normal.   Neurological: Alert. Normal strength. No cranial nerve deficit. GCS 15.  Skin: Skin " is warm and dry.   Psychiatric: Normal mood and affect.        Emergency Department Course     ECG (03:03:07):  Indication: fall.   Rate 70 bpm. NE interval 192 ms. QRS duration 68 ms. QT/QTc 448/483 ms. R axis 41.   Interpretation: normal sinus rhythm. Low voltage QRS. Possible anterolateral infarct, age undetermined. Abnormal ECG.  Agree with computer interpretation.   New criteria for anterolateral infarct. Voltage decreased compared to ECG dated 12/27/07.  Interpreted at 0305 by Curtis Lucas MD.     Imaging:  Radiology findings were communicated with the patient who voiced understanding of the findings.    Head CT, without contrast, per radiology:    No evidence of acute intracranial abnormality.    Pelvis with Right Hip XR, per radiology:    No visualized acute fracture, malalignment or other acute osseous abnormality of the visualized portions of the pelvis or right hip.     Right Wrist XR, per radiology:     1. A subtle linear transverse band of relatively increased density within the distal right radial metaphysis. This is not convincing for an acute fracture, but a minimally impacted acute fracture cannot be entirely excluded.  2. Normal alignment of the right wrist.  3. Diffuse osteopenia.  4. Severe degenerative changes in the right first carpometacarpal joint.    Laboratory:  Laboratory findings were communicated with the patient who voiced understanding of the findings.    CBC: WBC 4.9, HGB 12.6,   CMP: Glucose 148, Creatinine 1.20, GFR 42, Ca 7.9, Albumin 3.1, Protein 6.0   Creatinine 5/19/17: 0.96  Lipase: 131     Procedures:    PROCEDURE: Splint Placement.    Volar fiberglass splint was applied to the right upper extremity and after placement I checked and adjusted the fit to ensure proper positioning.  The patient was more comfortable with the splint in place.  Sensation and circulation are intact after splint placement.     Interventions:  0255: Zofran 4mg, IV   0258: Morphine, 4 mg,  IV   0301:  mL, IV    0345, Zofran 4mg, IV      Emergency Department Course:  Nursing notes and vitals reviewed.  I performed an exam of the patient as documented above.     The patient was placed on continuous pulse oximetry and cardiac monitoring.   A peripheral IV was established and blood was drawn for laboratory testing, results above.  X-rays and head CT obtained while in the emergency department, findings above.      0330, patient was unable to ambulate in the ED.   0340, patient was feeling nauseated again, will provide Zofran.  0355, rechecked patient and discussed plan of care with her and her family.     I rechecked the patient and the findings were explained to them, who consent to admission. I discussed the patient with Dr. Ibarra, who will admit the patient for further monitoring, evaluation and treatment.      Impression & Plan      Medical Decision Making:  Eri Puckett is a 89 year old female who presents for evaluation following a fall.  The fall was clearly a mechanical in nature based on description.  ECG was obtained nonetheless, which showed no evidence of arrhythmias or acute ischemic changes. Laboratory evaluation was significant for elevated creatinine at 1.20, mild albuminemia and proteinemia. Troponin was negative.     Full examination revealed tenderness to palpation of right wrist and right hip.  X-Ray imaging showed a possible minimally impacted acute fracture at the level of the distal right radial metaphysis, but no abnormalities at the hip/pelvis.  CT hip showed a hematoma, but no fracture.    In terms of head injury, the patient had a direct injury to the head, and despite not on any blood thinners, and having no headache, with vomiting following the fall, CT of the head was obtained, which was negative for acute abnormalities.  In terms of neck injury, the patient was cleared by NEXUS criteria.      Splint was placed on right upper extremity per procedural note above and  patient was feeling improved. However, during her stay in the ED patient was unable to ambulate with assistance, and had a new episode of emesis. Given this, her age and medical history, as well as the patient's preference to be admitted to the hospital, I will admit under the care of the hospitalist team.     Diagnosis:    ICD-10-CM   1. Hip pain M25.559   2. Fall, initial encounter W19.XXXA   3. Contusion of right hip, initial encounter S70.01XA   4. Right wrist pain M25.531   5. Non-intractable vomiting with nausea, unspecified vomiting type R11.2     Disposition:   Observation unit     Scribe Disclosure:  Sobeida WILKINSON, am serving as a scribe at 12:34 AM on 10/11/2017 to document services personally performed by Curtis Lucas MD, based on my observations and the provider's statements to me.     EMERGENCY DEPARTMENT       Curtis Lucas MD  10/11/17 0708

## 2017-10-11 NOTE — IP AVS SNAPSHOT
"[place] [address] [facility]: [phone]                                              INTERAGENCY TRANSFER FORM - PHYSICIAN ORDERS   10/11/2017                    Hospital Admission Date: 10/11/2017  SEGUN KELLY   : 1928  Sex: Female        Attending Provider: Nathaniel Ibarra MD     Allergies:  Cephalexin    Infection:  None   Service:  HOSPITALIST    Ht:  1.499 m (4' 11\")   Wt:  79.4 kg (175 lb 0.7 oz)   Admission Wt:  72.6 kg (160 lb)    BMI:  35.35 kg/m 2   BSA:  1.82 m 2            Patient PCP Information     Provider PCP Type    Kylee Liang General      ED Clinical Impression     Diagnosis Description Comment Added By Time Added    Hip pain [M25.559] Hip pain [M25.559]  Kelly Cobian 10/11/2017  4:29 AM    Fall, initial encounter [W19.XXXA] Fall, initial encounter [W19.XXXA]  Curtis Lucas MD 10/11/2017  4:33 AM    Contusion of right hip, initial encounter [S70.01XA] Contusion of right hip, initial encounter [S70.01XA]  Curtis Lucas MD 10/11/2017  4:33 AM    Right wrist pain [M25.531] Right wrist pain [M25.531]  Curtis Lucas MD 10/11/2017  4:33 AM    Non-intractable vomiting with nausea, unspecified vomiting type [R11.2] Non-intractable vomiting with nausea, unspecified vomiting type [R11.2]  Curtis Lucas MD 10/11/2017  4:34 AM      Hospital Problems as of 10/14/2017              Priority Class Noted POA    Fall Medium  10/11/2017 Yes    Right wrist pain Medium  10/14/2017 Yes    Contusion of right hip, initial encounter Medium  10/14/2017 Yes    Hip pain Medium  10/14/2017 Yes      Non-Hospital Problems as of 10/14/2017     None      Code Status History     Date Active Date Inactive Code Status Order ID Comments User Context    10/14/2017  2:17 PM  Full Code [id number]  Lester Jacob MD Outpatient    10/11/2017  6:54 AM 10/14/2017  2:17 PM Full Code [id number]  Nathaniel Ibarra MD Inpatient         Medication Review      START taking     "    Dose / Directions Comments    acetaminophen 325 MG tablet   Commonly known as:  TYLENOL        Dose:  650 mg   Take 2 tablets (650 mg) by mouth every 4 hours as needed for mild pain   Quantity:  100 tablet   Refills:  0          CONTINUE these medications which have NOT CHANGED        Dose / Directions Comments    ALLOPURINOL PO        Dose:  100 mg   Take 100 mg by mouth daily   Refills:  0        BLINK TEARS OP        Dose:  1 drop   Apply 1 drop to eye as needed (to both eye as needed)   Refills:  0        brimonidine 0.15 % ophthalmic solution   Commonly known as:  ALPHAGAN-P        Dose:  1 drop   Place 1 drop Into the left eye 2 times daily   Refills:  0        calcium-vitamin D 600-400 MG-UNIT per tablet   Commonly known as:  CALTRATE        Dose:  1 tablet   Take 1 tablet by mouth 2 times daily   Refills:  0        CENTRUM SILVER per tablet        Dose:  1 tablet   Take 1 tablet by mouth daily   Refills:  0        CRESTOR PO        Dose:  20 mg   Take 20 mg by mouth daily   Refills:  0        FLUOXETINE HCL PO        Dose:  20 mg   Take 20 mg by mouth 2 times daily   Refills:  0        LISINOPRIL PO        Dose:  15 mg   Take 15 mg by mouth daily   Refills:  0        OMEPRAZOLE PO        Dose:  20 mg   Take 20 mg by mouth every morning   Refills:  0        REFRESH OPTIVE 1-0.9 % Gel   Generic drug:  Carboxymethylcellul-Glycerin        Apply to eye 2 times daily Gel to left eye   Refills:  0        TRADJENTA PO        Dose:  2.5 mg   Take 2.5 mg by mouth daily   Refills:  0        VITAMIN B 12 PO        Dose:  1000 mcg   Take 1,000 mcg by mouth daily   Refills:  0        VITAMIN D3 PO        Dose:  2000 Units   Take 2,000 Units by mouth daily   Refills:  0          STOP taking     ASPIRIN EC PO                     Further instructions from your care team       Follow Up with Dr. Aceves in his office on Tuesday October 17, 2017 for splint removal.  Akiko from Dr. Aceves's office will be calling you to  schedule this. You can reach her at [phone].    Summary of Visit     Reason for your hospital stay       History of fall with suspected right radius fracture and right thigh hematoma.             After Care     Activity - Up with assistive device           Activity - Up with nursing assistance           Additional Discharge Instructions       For CBC Check on 10/16/17.       Advance Diet as Tolerated       Follow this diet upon discharge:       Moderate Consistent CHO Diet       General info for SNF       Length of Stay Estimate: Short Term Care: Estimated # of Days <30  Condition at Discharge: Stable  Level of care:skilled   Rehabilitation Potential: Good  Admission H&P remains valid and up-to-date: Yes  Recent Chemotherapy: N/A  Use Nursing Home Standing Orders: Yes       Mantoux instructions       Give two-step Mantoux (PPD) Per Facility Policy Yes.             Referrals     Occupational Therapy Adult Consult       Evaluate and treat as clinically indicated.    Reason:  History of fall with suspected right radius fracture and right thigh hematoma.       Physical Therapy Adult Consult       Evaluate and treat as clinically indicated.    Reason: History of fall with suspected right radius fracture and right thigh hematoma.             Follow-Up Appointment Instructions     Future Labs/Procedures    Follow Up and recommended labs and tests     Comments:    Follow up with Dr. Aceves in 1 week post discharge. for review and change of splint      Follow-Up Appointment Instructions     Follow Up and recommended labs and tests       Follow up with Dr. Aceves in 1 week post discharge. for review and change of splint             Statement of Approval     Ordered          10/14/17 1417  I have reviewed and agree with all the recommendations and orders detailed in this document.  EFFECTIVE NOW     Approved and electronically signed by:  Lester Jacob MD

## 2017-10-11 NOTE — PROGRESS NOTES
" 10/11/17 1100   Quick Adds   Type of Visit Initial PT Evaluation   Living Environment   Lives With alone   Living Arrangements condominium   Home Accessibility tub/shower is not walk in   Number of Stairs to Enter Home 0   Number of Stairs Within Home 0   Transportation Available family or friend will provide;agency transportation  (pt does not drive; uses family vs condo transport)   Self-Care   Usual Activity Tolerance good   Current Activity Tolerance fair   Regular Exercise no   Equipment Currently Used at Home walker, rolling;shower chair;grab bar   Functional Level Prior   Ambulation 0-->independent   Transferring 0-->independent   Toileting 0-->independent   Bathing 0-->independent   Dressing 0-->independent   Eating 0-->independent   Communication 0-->understands/communicates without difficulty   Swallowing 0-->swallows foods/liquids without difficulty   Cognition 0 - no cognition issues reported   Fall history within last six months yes   Number of times patient has fallen within last six months 1   Which of the above functional risks had a recent onset or change? ambulation;transferring;toileting;bathing;dressing   Prior Functional Level Comment Pt lives alone in a senior apartment, elevator access. Pt reports she is IND for functional mobility without AD at baseline, later states she \"sometimes\" uses her FWW. Pt's family assists with cleaning, medications, transportation; pt uses facility transport to do her own grocery shopping.   General Information   Onset of Illness/Injury or Date of Surgery - Date 10/11/17   Referring Physician Dino Aceves MD   Patient/Family Goals Statement To go home   Pertinent History of Current Problem (include personal factors and/or comorbidities that impact the POC) Pt is an 88yo female admitted after fall, found to have R distal radius impacted fracture and R hip subcutaneous hematoma. Per ortho note, pt can be WBAT on RUE in splint and ambulate as tolerated with " assistance.    Precautions/Limitations fall precautions   Weight-Bearing Status - RUE weight-bearing as tolerated   Weight-Bearing Status - RLE weight-bearing as tolerated   General Observations Pt resting in bed, RUE in splint/cast, family at bedsdie. Pt with IV   General Info Comments Activity: per ortho note, pt can be WBAT on RUE with splint, can ambulate as tolerated with assistance   Cognitive Status Examination   Orientation orientation to person, place and time   Level of Consciousness alert   Follows Commands and Answers Questions 100% of the time;able to follow multistep instructions   Personal Safety and Judgment intact   Integumentary/Edema   Integumentary/Edema Comments hematoma to R hip   Posture    Posture Forward head position;Protracted shoulders   Range of Motion (ROM)   ROM Comment BLE WNL with AAROM, limited by pain in R hip   Strength   Strength Comments RLE limited by pain, LLE grossly 4/5   Bed Mobility   Bed Mobility Comments Trice supine>sit with HOB elevated and with rail   Transfer Skills   Transfer Comments Trice sit>stand to FWW   Gait   Gait Comments unable to assess, pt becoming diaphoretic and pale when standing from commode prior to initiating ambulation   Balance   Balance Comments Fairly good sitting balance, decreased standing balance   Sensory Examination   Sensory Perception Comments Denies N/T   Coordination   Coordination no deficits were identified   Muscle Tone   Muscle Tone no deficits were identified   General Therapy Interventions   Planned Therapy Interventions balance training;bed mobility training;gait training;neuromuscular re-education;ROM;strengthening;stretching;transfer training;progressive activity/exercise;home program guidelines   Clinical Impression   Criteria for Skilled Therapeutic Intervention yes, treatment indicated   PT Diagnosis Impaired gait   Influenced by the following impairments Pain, weakness, decreased ROM, fatigue, decreased activity tolerance,  "syncopal and near syncopal episodes during hospitalization   Functional limitations due to impairments Decreased safety and independence with functional transfers, gait   Clinical Presentation Evolving/Changing   Clinical Presentation Rationale syncopal/ near syncopal episodes   Clinical Decision Making (Complexity) Low complexity   Therapy Frequency` daily   Predicted Duration of Therapy Intervention (days/wks) 4 days   Anticipated Discharge Disposition Transitional Care Facility;Home with Home Therapy;Home with Assist   Risk & Benefits of therapy have been explained Yes   Patient, Family & other staff in agreement with plan of care Yes   [facility] TM \"6 Clicks\"   2016, Trustees of [facility], under license to [employer].  All rights reserved.   6 Clicks Short Forms Basic Mobility Inpatient Short Form   [facility]  \"6 Clicks\" V.2 Basic Mobility Inpatient Short Form   1. Turning from your back to your side while in a flat bed without using bedrails? 3 - A Little   2. Moving from lying on your back to sitting on the side of a flat bed without using bedrails? 3 - A Little   3. Moving to and from a bed to a chair (including a wheelchair)? 3 - A Little   4. Standing up from a chair using your arms (e.g., wheelchair, or bedside chair)? 3 - A Little   5. To walk in hospital room? 3 - A Little   6. Climbing 3-5 steps with a railing? 2 - A Lot   Basic Mobility Raw Score (Score out of 24.Lower scores equate to lower levels of function) 17   Total Evaluation Time   Total Evaluation Time (Minutes) 10     "

## 2017-10-11 NOTE — H&P
PRIMARY CARE PROVIDER:  Kylee Liang PA-C      CHIEF COMPLAINT:  Right wrist and right hip pain after a fall.      HISTORY OF PRESENT ILLNESS:  Eri Puckett is an 89-year-old woman with medical history significant for mild intermittent asthma, non-insulin-dependent diabetes mellitus, hypertension, dyslipidemia, gout, peripheral neuropathy, degenerative joint disease, glaucoma, lymphoma in remission, who was brought to the ER by EMS for evaluation after a fall.  I discussed with the patient, reviewed her medical record in Epic, and also discussed with Dr. Lucas who evaluated the patient in the ER, for further information.      According to the patient, she was organizing her closet because they are replacing the windows in her living room, which was around 11:00 p.m. last night, and when she turned around from the closet door, she stepped on a metal bar by the door in the hallway, lost her balance and fell backward and landed on her bottom.  Her buttock and right wrist started hurting immediately.  She was not able to get up.  She denies hitting her head or any loss of consciousness or seizure-like activity or incontinence.  Denies any chest pain, palpitations or dizziness prior to the event.  She did report to the ED physician, though, that she was able to ambulate without assistance.  The patient told me that she was not able to get up, so she pressed the Life Alert button, and she received a call and informed them of the situation, and so an ambulance was sent, and the patient was brought to the ER.      She denies any fever, cough, diarrhea, nausea, vomiting or urinary symptoms.  She feels her mouth is very dry.      Though she did not have any nausea or vomiting before coming to the ER, she had dry heaves and vomited while she was in the ER.  Denies abdominal pain, distention/bloating.      In the ER, the patient was evaluated by Dr. Lucas.  Noted to have mild thrombocytopenia with a platelet count of  135,000.  Blood sugar was 148, troponin was negative, and creatinine was 1.2.  Unable to review any recent lab data.      Multiple x-rays were done including right wrist and pelvis.  Pelvis and hip x-rays were negative for any acute fracture.  Right wrist x-ray showed subtle linear transverse band of relatedly increased density within the distal right radial metaphysis, not convincing for acute fracture, but minimally impacted acute fracture could not be ruled out.  CT head without contrast was done which was negative for acute intracranial process.      The patient was noted to have tenderness along her right thigh and buttock with swelling and possibly fluctuance, so a CT scan has been planned to evaluate for any hematoma and also a fracture, and the patient will be admitted after above for further evaluation.     Review of systems: All 10 points of systems reviewed and is negative other than mentioned in HPI.     PAST MEDICAL HISTORY:   1.  Mild intermittent asthma.   2.  Diabetes mellitus, type 2.   3.  Hypertension.   4.  Hyperlipidemia.   5.  Peripheral neuropathy.   6.  Gout.   7.  Degenerative joint disease.   8.  Glaucoma.   9.  Lymphoma, in remission.   10.  Osteoarthritis.   11.  Gastroesophageal reflux disease.   12.  Motion sickness.   13.  Dysthymic disorder.      SOCIAL HISTORY:  Never smoked.  Does not use alcohol.  No recreational drug use.  Lives in an apartment by herself.      ALLERGIES:  Keflex.      HOME MEDICATIONS:    Prior to Admission medications    Medication Sig Last Dose Taking? Auth Provider   acetaminophen (TYLENOL) 325 MG tablet Take 2 tablets (650 mg) by mouth every 4 hours as needed for mild pain  Yes Lester Jacob MD   FLUOXETINE HCL PO Take 20 mg by mouth 2 times daily 10/10/2017 at am Yes Unknown, Entered By History   Linagliptin (TRADJENTA PO) Take 2.5 mg by mouth daily 10/9/2017 at pm Yes Unknown, Entered By History   brimonidine (ALPHAGAN-P) 0.15 % ophthalmic  solution Place 1 drop Into the left eye 2 times daily 10/10/2017 at am Yes Unknown, Entered By History   Carboxymethylcellul-Glycerin (REFRESH OPTIVE) 1-0.9 % GEL Apply to eye 2 times daily Gel to left eye 10/10/2017 at am Yes Unknown, Entered By History   Polyethylene Glycol 400 (BLINK TEARS OP) Apply 1 drop to eye as needed (to both eye as needed)  at prn Yes Unknown, Entered By History   LISINOPRIL PO Take 15 mg by mouth daily 10/9/2017 at noon Yes Unknown, Entered By History   ALLOPURINOL PO Take 100 mg by mouth daily 10/10/2017 at am Yes Reported, Patient   Rosuvastatin Calcium (CRESTOR PO) Take 20 mg by mouth daily 10/9/2017 at pm Yes Reported, Patient   OMEPRAZOLE PO Take 20 mg by mouth every morning 10/9/2017 at noon Yes Reported, Patient   Multiple Vitamins-Minerals (CENTRUM SILVER) per tablet Take 1 tablet by mouth daily 10/9/2017 at noon Yes Reported, Patient   calcium-vitamin D (CALTRATE) 600-400 MG-UNIT per tablet Take 1 tablet by mouth 2 times daily 10/10/2017 at am Yes Reported, Patient   Cyanocobalamin (VITAMIN B 12 PO) Take 1,000 mcg by mouth daily 10/9/2017 at noon Yes Reported, Patient   Cholecalciferol (VITAMIN D3 PO) Take 2,000 Units by mouth daily 10/9/2017 at pm  Reported, Patient        FAMILY HISTORY:  Reviewed and is noncontributory to her presentation at this time.      PHYSICAL EXAMINATION:   GENERAL:  The patient is alert, awake, oriented to time, place, person.  ( She knows she is in a hospital, but is not exactly sure that this is [facility].)  Appears comfortable.   HEENT:  Pupils are bilaterally equal, round, reactive to light.  Oral mucosa is very dry.   NECK:  Supple.   LUNGS:  Bilateral equal air entry, clear to auscultation.  Normal work of breathing.  On room air.   CARDIOVASCULAR:  S1, S2, regular.  Soft systolic murmur.  No tachycardia.   ABDOMEN:  Soft, obese/distended, nontender.  Bowel sounds are active.   MUSCULOSKELETAL:  The patient's right thigh is  swollen proximally with tenderness around the thigh.  There is no bruising, but an area of fluctuance noted in the proximal thigh on the anterolateral aspect, and is tender to palpation.  Distal dorsalis pedis and posterior tibial pulses are good, and feet are warm.  Right upper extremity with a cast.   NEUROLOGIC:  Alert, awake, oriented x3.  Cranial nerves II-XII intact.  Motor strength equal and symmetrical.      LAB DATA:  White cell count is 4.9, hemoglobin 12.6, hematocrit 38.2, platelet 135,000.  Blood sugar 148.  Troponin less than 0.015.  Sodium 141, potassium 3.8, chloride 104, bicarbonate 29, BUN 21, creatinine 1.2, calcium 7.9, albumin 3.1, total protein 6.0, alkaline phosphatase 49, total bilirubin 0.4, ALT 21, AST 22.  Lipase is 131.      IMAGING:  CT head without contrast was negative for acute intracranial process.      X-ray of the right wrist shows subtle linear transverse band of relatedly increased density within the distal right radial metaphysis, not convincing for acute fracture, but minimally impacted acute fracture cannot be entirely excluded.      Pelvis and hip x-ray negative for any fracture, malalignment, or acute osseous abnormality.      ASSESSMENT AND PLAN:  Eri Puckett is an 89-year-old woman with the above-mentioned medical problems, who was brought to the ER after a fall causing right hip and right wrist pain.   1.  Suspected right radial wrist fracture, and also possible right thigh hematoma due to mechanical fall:  The patient reports she had a mechanical fall.  As stated above, x-rays of her right hip and pelvis as well as right wrist do not show any obvious fracture, but a right radial wrist fracture could not be ruled out.  Clinically, she also seems to possibly have a right thigh hematoma.  The patient will be getting a CT of her right hip and thigh to evaluate for fracture as well as hematoma.  We will consult Orthopedic Surgery to evaluate the above.  The patient will  be admitted, and I have ordered Tylenol and IV hydromorphone for pain control.  I will keep her n.p.o. until CT report is available in case there is a fracture, or a big hematoma requiring surgical evacuation.  Bed rest until then.  Further activity ordered based on CT report.   2.  Episode of dry heaving and vomiting in the ER:  Likely due to pain, which has improved now:  p.r.n. antiemetic, IV hydration, and follow electrolytes.   3.  Hypertension and hyperlipidemia:  Prior to admission, she was on lisinopril and Crestor.  We will resume lisinopril with holding parameters.  We will hold her Crestor for now, and resume at discharge.   4.  Diabetes mellitus, type 2:  Per our records, she is on glyburide and sitagliptin, medications yet to be verified.  We will start her on low-dose Lantus 5 units subq daily, with medium intensity sliding scale.  Resume above medications based on further surgical plan, if not n.p.o.   5.  Dysthymia:  The patient takes fluoxetine.  This will be resumed once verified.   6.  Mild thrombocytopenia, unknown baseline:  Could be due to consumption from hematoma.  We will hold aspirin for now, and monitor platelet count.   7.  Gout:  Resume allopurinol when verified.   8.  Mild intermittent asthma:  p.r.n. albuterol will be ordered.   9.  Elevated creatinine, unknown baseline:  Unclear if this represents acute kidney injury versus chronic kidney disease stage III. IV hydration and follow.  10.  DVT prophylaxis:  PCDs for now.   11.  Gastroesophageal reflux disease and gastrointestinal prophylaxis:  The patient takes omeprazole at home, and will be resumed.   13.  CODE STATUS:  FULL CODE.      The above plan was discussed with the patient.         WAYNE BOYLE MD             D: 10/11/2017 05:44   T: 10/11/2017 07:24   MT: [id number]      Name:     SEGUN KELLY   MRN:      [id number]        Account:      [id number]   :      1928           Admitted:     [id number]       Document: [id number]       cc: Kylee Liang PA-C

## 2017-10-11 NOTE — IP AVS SNAPSHOT
MRN:[id number]                      After Visit Summary   10/11/2017    Eri Puckett    MRN: [id number]           Thank you!     Thank you for choosing [facility] for your care. Our goal is always to provide you with excellent care. Hearing back from our patients is one way we can continue to improve our services. Please take a few minutes to complete the written survey that you may receive in the mail after you visit with us. Thank you!        Patient Information     Date Of Birth          6/8/1928        Designated Caregiver       Most Recent Value    Caregiver    Will someone help with your care after discharge? yes    Name of designated caregiver Blanca Cuevas dtr    Phone number of caregiver [phone]    Caregiver address [place]      About your hospital stay     You were admitted on:  October 11, 2017 You last received care in the:  [place] [address] [facility]    You were discharged on:  October 14, 2017        Reason for your hospital stay       History of fall with suspected right radius fracture and right thigh hematoma.                  Who to Call     For medical emergencies, please call 911.  For non-urgent questions about your medical care, please call your primary care provider or clinic, [phone]          Attending Provider     Provider Specialty    Curtis Lucas MD Emergency Medicine    [facility]       Primary Care Provider Office Phone # Fax #    Kylee Liang [phone]      After Care Instructions     Activity - Up with assistive device           Activity - Up with nursing assistance           Additional Discharge Instructions       For CBC Check on 10/16/17.            Advance Diet as Tolerated       Follow this diet upon discharge:       Moderate Consistent CHO Diet            General info for SNF       Length of Stay Estimate: Short Term Care: Estimated # of Days <30  Condition at Discharge:  "Stable  Level of care:skilled   Rehabilitation Potential: Good  Admission H&P remains valid and up-to-date: Yes  Recent Chemotherapy: N/A  Use Nursing Home Standing Orders: Yes            Mantoux instructions       Give two-step Mantoux (PPD) Per Facility Policy Yes.                  Follow-up Appointments     Follow Up and recommended labs and tests       Follow up with Dr. Aceves in 1 week post discharge. for review and change of splint                  Additional Services     Occupational Therapy Adult Consult       Evaluate and treat as clinically indicated.    Reason:  History of fall with suspected right radius fracture and right thigh hematoma.            Physical Therapy Adult Consult       Evaluate and treat as clinically indicated.    Reason: History of fall with suspected right radius fracture and right thigh hematoma.                  Further instructions from your care team       Follow Up with Dr. Aceves in his office on Tuesday October 17, 2017 for splint removal.  Akiko from Dr. Aceves's office will be calling you to schedule this. You can reach her at [phone].    Pending Results     Date and Time Order Name Status Description    10/11/2017 0712 EKG 12-lead, tracing only Preliminary             Statement of Approval     Ordered          10/14/17 4637  I have reviewed and agree with all the recommendations and orders detailed in this document.  EFFECTIVE NOW     Approved and electronically signed by:  Lester Jacob MD             Admission Information     Date & Time Provider Department Dept. Phone    10/11/2017 Nathaniel Ibarra MD [place] [address] [facility] [phone]      Your Vitals Were     Blood Pressure Pulse Temperature Respirations Height Weight    168/63 (BP Location: Left arm) 71 98.7  F (37.1  C) (Oral) 16 1.499 m (4' 11\") 79.4 kg (175 lb 0.7 oz)    Pulse Oximetry BMI (Body Mass Index)                94% 35.35 kg/m2          MyChart Information     MyChart " "lets you send messages to your doctor, view your test results, renew your prescriptions, schedule appointments and more. To sign up, go to [email or web address] . Click on \"Log in\" on the left side of the screen, which will take you to the Welcome page. Then click on \"Sign up Now\" on the right side of the page.     You will be asked to enter the access code listed below, as well as some personal information. Please follow the directions to create your username and password.     Your access code is: [email or web address]  Expires: 1/10/2018  5:25 PM     Your access code will  in 90 days. If you need help or a new code, please call your [place] clinic or [phone].        Care EveryWhere ID     This is your Care EveryWhere ID. This could be used by other organizations to access your [facility] medical records  [id number]        Equal Access to Services     DEVEN JACOBS : Thony Gorman, warenetta casey, qarashaun kaalmakellie quesada, savita pittman . So [facility] [phone].    ATENCIÓN: Si calvinla kannan, tiene a moe disposición servicios gratuitos de asistencia lingüística. Claudette al [phone].    We comply with applicable federal civil rights laws and [place] laws. We do not discriminate on the basis of race, color, national origin, age, disability, sex, sexual orientation, or gender identity.               Review of your medicines      START taking        Dose / Directions    acetaminophen 325 MG tablet   Commonly known as:  TYLENOL        Dose:  650 mg   Take 2 tablets (650 mg) by mouth every 4 hours as needed for mild pain   Quantity:  100 tablet   Refills:  0         CONTINUE these medicines which have NOT CHANGED        Dose / Directions    ALLOPURINOL PO        Dose:  100 mg   Take 100 mg by mouth daily   Refills:  0       BLINK TEARS OP        Dose:  1 drop   Apply 1 drop to eye as needed (to both eye as needed)   Refills:  0       brimonidine 0.15 % ophthalmic solution "   Commonly known as:  ALPHAGAN-P        Dose:  1 drop   Place 1 drop Into the left eye 2 times daily   Refills:  0       calcium-vitamin D 600-400 MG-UNIT per tablet   Commonly known as:  CALTRATE        Dose:  1 tablet   Take 1 tablet by mouth 2 times daily   Refills:  0       CENTRUM SILVER per tablet        Dose:  1 tablet   Take 1 tablet by mouth daily   Refills:  0       CRESTOR PO        Dose:  20 mg   Take 20 mg by mouth daily   Refills:  0       FLUOXETINE HCL PO        Dose:  20 mg   Take 20 mg by mouth 2 times daily   Refills:  0       LISINOPRIL PO        Dose:  15 mg   Take 15 mg by mouth daily   Refills:  0       OMEPRAZOLE PO        Dose:  20 mg   Take 20 mg by mouth every morning   Refills:  0       REFRESH OPTIVE 1-0.9 % Gel   Generic drug:  Carboxymethylcellul-Glycerin        Apply to eye 2 times daily Gel to left eye   Refills:  0       TRADJENTA PO        Dose:  2.5 mg   Take 2.5 mg by mouth daily   Refills:  0       VITAMIN B 12 PO        Dose:  1000 mcg   Take 1,000 mcg by mouth daily   Refills:  0       VITAMIN D3 PO        Dose:  2000 Units   Take 2,000 Units by mouth daily   Refills:  0         STOP taking     ASPIRIN EC PO                Where to get your medicines      Some of these will need a paper prescription and others can be bought over the counter. Ask your nurse if you have questions.     You don't need a prescription for these medications     acetaminophen 325 MG tablet                Protect others around you: Learn how to safely use, store and throw away your medicines at www.disposemymeds.org.             Medication List: This is a list of all your medications and when to take them. Check marks below indicate your daily home schedule. Keep this list as a reference.      Medications           Morning Afternoon Evening Bedtime As Needed    acetaminophen 325 MG tablet   Commonly known as:  TYLENOL   Take 2 tablets (650 mg) by mouth every 4 hours as needed for mild pain   Last time  this was given:  650 mg on 10/13/2017 12:25 PM                                ALLOPURINOL PO   Take 100 mg by mouth daily   Last time this was given:  100 mg on 10/14/2017  8:16 AM                                BLINK TEARS OP   Apply 1 drop to eye as needed (to both eye as needed)                                brimonidine 0.15 % ophthalmic solution   Commonly known as:  ALPHAGAN-P   Place 1 drop Into the left eye 2 times daily                                calcium-vitamin D 600-400 MG-UNIT per tablet   Commonly known as:  CALTRATE   Take 1 tablet by mouth 2 times daily   Last time this was given:  1 tablet on 10/14/2017  8:15 AM                                CENTRUM SILVER per tablet   Take 1 tablet by mouth daily   Last time this was given:  1 tablet on 10/14/2017 12:39 PM                                CRESTOR PO   Take 20 mg by mouth daily   Last time this was given:  20 mg on 10/13/2017  8:00 PM                                FLUOXETINE HCL PO   Take 20 mg by mouth 2 times daily   Last time this was given:  20 mg on 10/14/2017  8:16 AM                                LISINOPRIL PO   Take 15 mg by mouth daily   Last time this was given:  15 mg on 10/12/2017  6:25 PM                                OMEPRAZOLE PO   Take 20 mg by mouth every morning   Last time this was given:  20 mg on 10/14/2017 12:39 PM                                REFRESH OPTIVE 1-0.9 % Gel   Apply to eye 2 times daily Gel to left eye   Generic drug:  Carboxymethylcellul-Glycerin                                TRADJENTA PO   Take 2.5 mg by mouth daily   Last time this was given:  2.5 mg on 10/14/2017  8:16 AM                                VITAMIN B 12 PO   Take 1,000 mcg by mouth daily                                VITAMIN D3 PO   Take 2,000 Units by mouth daily   Last time this was given:  2,000 Units on 10/13/2017  7:52 PM

## 2017-10-11 NOTE — ED NOTES
Pt was trying to back out of a storage room at her apartment and tripped over metal threshing between the rooms and fell back landing on her right hip and hit her head on the ground. Pt denies any LOC and was able to push her life alert button. Pt was able to ambulate a few steps with EMS help but then became diaphoretic and had an emesis so was brought in for eval. Pt is pleasantly confused, talking about how she just went in today for some testing for mentation apparently.

## 2017-10-11 NOTE — SIGNIFICANT EVENT
"[facility] Physician Progress Note     0645 - Wed 10 Oct 2017   502    # syncopal episode with standing leading to controlled unplanned descent to floor - \"fall\" -   -- orthostasis most likely - strongly doubt dysrhythmia (no tele on at this time)   -- vasovagal (not signif pain now w weight bearing as etiology vagal episode)    -- no apparent new/acute injury   -- no imaging needed now     --> lytes hgb   --> tele as ordered   --> restart iv - ?out in transfer from  ac   --> cont same plan   --> d/w and defer further care to Dr Oliveira / Internal Medicine Hospitalist     PEDRO PABLO Hicks MD / 15 min   [place] Physician / [phone] (p)   _______________________________________      History (RN Pt)  -   - stood up to walk from cart in [place] to bed 2 near window   - c/o nausea (no emesis)   - reports dimming out - black out     - +LOC   - staff used transfer belt controlled gentle descent gentle touch down to floor   - no force of impact or extreme positioning risk strain sprain fx   - no head trauma     - lifted back to cart with 6 staff   - 10 sec out   - regained responsiveness once supine once transferred cart to bed     - first vs < 5 min after event supine in bed as below     - no new pain (cont R hip pain after fall yest)     - no po fluids x 2d (pt) - ?fall etiol orthostasis           Physical Exam -   Gen  - looks good - NAD - (HOB 30 )      - alert, cooperative   - nl speech/Lang    - breathing easily - nl rate/ effort/ depth     - good color, warm, dry     VS  - 133/61 - 94   0651 - 98% - 3L nc - 18 - easy - nl depth   - 36.6  C / 97.8  F (o)        0750  supine - 109/54 - 96   sitting - 81/38 - 60 - orthostatic drop in bp but relative bradycardia - ?vagal?  stand - not done     Head  - atraumatic    -- (no: erythema, ecchymoses, abrasion, lac, swelling/lumps/bumps, deformity, tenderness)    Neck  - supple    - JVP < 2 cm above sternal notch      Lung  - clear bilat equal ausc " posterolateral base     Heart  - RRR, nl s1s2    Abd  - ND, soft, NT     Extr (B) - distally warm, dry, good color    - no pain with int/ext rotation hip bilat      - no increased pain over baseline since yest fall        - (known large hematoma lateral proximal R thigh - no fx R femur or pelvis)           Lab -    0030    Creat  1.20   estGFR 42     Na 141   K 3.8     Alb  3.1   Ca  7.9       Trop  < 0.015     Wbc  4.9   neut  63.6   Ly  18.4   Mono 12.9     hgb   12.6   hct  38.2   Rbc  4.37   mcv  87   rdw  13.5     plt 135         ekg  - no acute/sttw changes - 1 avb + pacs - QT/c prolonoged   4016 - 20 sinus -  (-44*)  - ( 222 - 88 - 400 / 500)   0303  - 70 sinus - (+ 41*) - (192  - 62 - 448 / 483)             _____________________________________    Original note filed below - full note entered above -     0645 - Wed 10 Oct 2017   502    # syncopal episode   -- orthostasis   -- vasovagal   -- no apparent injury     --> lytes hgb   --> tele as ordered   --> d/w and defer to Dr Oliveira / Internal Medicine Hospitalist     PEDRO PABLO Hicks MD / 15 min   [facility] Physician / [phone] (p)   _______________________________________                      APPENDICES -   - Key to symbols, abbreviations, and format conventions   - Keystrokes for convenience         KEY TO SYMBOLS, ABBREVIATIONS, AND FORMAT CONVENTIONS - [ordered by sense / meaning]     Symbol (Description)  Meaning / interpretation     #  (number/ hashtag)   - problem - summary which does not need editing if copied forward     -  (dash)   - data - s/sx (changes) found/observed at time pt seen     --  (double dash/ hyphen)  - Assessment/ Interpretation/ Impression - of observations in context of problem -       - - improved, worsened no change   -->  (arrow)    - Plan - action to be done in response to assessment -       - - either that day either or after discharge       --->>  (double arrow)   - cont same plan (eg --->> lisinopril  =  Cont lisinopril )         ([place])   - degrees    [   ] (brackets)  - [author's (my own) comments/thoughts/additions/assumptions]      0'4 (apostrophe)   - instead of period - not stop highlighting entire line as a single unit - means 0.4           Abbrv'n    Abbreviation    - Time -   -    - date-time (month implied)  - (-2d)  - 2d ago   - d  - day(s)   - m - min(s)   - minute(s)   - h  - hr(s)   - hour(s)   - mo  - month(s)       - c  - with   - w /   - with     - s  - without   - w /o  - without     - x  - except     - p  - after   - a  - before     - c/w  - consistent with   - c/c  - compare/contrast - ie differentiate from       - sx  - symptomatic   - asx  - asymptomatic       - sx  - symptoms   - s/sx  - signs/symptoms       - dx  - diagnosis   - dz  - disease   - dz  - dizzy   - ez  - easy       - tx  - treatment (or transplant)   - Tx  - Transplant (eg. LRD RTx - Living Related Donor Renal Transplant)     - px  - prophylaxis   - pphx  - prophylaxis       - d/c  - discharge (eg from hospital or from eye)       - cx  - culture (or canceled)   - cx  - cancelled       - fx  - fracture   - rxn  - reaction   - fxn  - function       - yest  - yesterday - (or yday)   - inés  - tomorrow       - nl  - normal   - abnl  - abnormal     - incr'd  -   - decr'd -     - hr (r)  - hyper-   - ho (o) - hypo-      - abx  - antibiotics       - cp  - chest pain   - sob  - shortness of breath       - dz/ lh - dizziness / lightheadedness   - HA - HeadAche (ha)   - H/N  - Head / Neck (h/n)       - f/c  - fevers/chills   - n/v  - nausea/vomiting       - cp  - chest pain/ pressure   - sob  - shortness of breath       - eg  - for example (e.g.)   - ie  - in other words (i.e.)       - 2   - secondary to (caused by, because of)   - 2/2  - secondary to (caused by, because of)     - dtr  - daughter   - mo  - mother   - fa  - father     - *o/w  - otherwise   - usu - usual     - * note: capital L used for visual clarity/emphasis where otherwise lower case is  "strictly correct -   - - eg in generic names drugs Labetalol or mL milliLiters        - [Cap]* - Capital Letter  - not need capital - only capital for clarity/emphasis in presentation here   - *Ctrl  - Control   - **Alt - Alternate         - BMBx  - Bone Marrow Biopsy             Anatomic Relationships -       - Lat - lateral (Lateral)        - prox  - proximal       Laterality (side) -   - bLat  - biLateral  - both sides   - iLat  - ipsiLateral  - same side   - cLat  - contraLateral  - opposite side       - L*  - Left*   - R  - Right   - B  - Bilateral   - (kumar)* - (any anatomical structure) + * (asterix) => Bilateral implied unless side / laterality specified    -- eg. Calf* => calf (B)    -- eg. Calf -  R .... L .... => different findings for R or L specified         - * note: capital L used for visual clarity/emphasis where otherwise lower case is strictly correct -   - - eg in generic names drugs Labetalol or mL milliLiters       - eg -     - UE  - Upper Extremity   - LE - Lower Extremity     - LUE   - Left Upper Extremity   - LLE  - Right Lower Extremity     - RUE  - Right Upper Extremity   - RLE  - Right Lower Extremity     - BU/LE  - Bilat Upper/Lower Extremities       __________________________________________                    KEYSTROKES FOR CONVENIENCE -   - from on-line summary article by Sukhdev Lopes-- + \"all-around computer geek\"      - Working with words instead of letters   - Moving the Cursor   - Selecting text   - Combine in sequence   - Editing   - Formatting   - Functions         Working with words instead of letters -     - using L or R arrow  (<- / ->), Backspace (<--), or Delete keys to select single characters     - add Control key (option key for [employer] users) to apply to entire words or phrases          - C/<-   - Ctrl + L arrow  - move cursor to beginning of previous word   - C/->   - Ctrl + R arrow - move cursor to beginning of next word     - C/<--  - Ctrl + Backspace  - " delete previous word   - C/delete  - Ctrl + Delete  - delete next word     - C/up   - Ctrl + up arrow  - select text from cursor to beginning current paragraph or text entry field   - C/dn   - Ctrl + dn arrow  - select text from cursor to end current paragraph or text entry field                 Moving the Cursor -     - Home   -   - beginning of current line   - End   -   - end of current line   - C/Home  - Ctrl + Home  - top of text entry field   - C/End   - Ctrl + End  - bottom of text entry field   - pg up   - Page Up  - up a frame   - pg dn   - Page Down  - down a frame                 Selecting text -      - ^/end   - shift + end   - select to end of current line   - ^/home  - shift + home  - select to beginning of current line     - C/Lclk  - ctrl* + L click   - select individual smartphrases/entries/lines from  list   - ^/Lclk   - shift + L click   - select all smartphrases/entries/lines from initial selection to next     - ^<-   - shift + L arrow   - select characters one at a time   - ^->   - shift + R arrow   - select characters one at a time     - ^up    - shift  + up arrow   - select line above   - ^dn   - shift + dn arrow   - select line below     - ^C<-  - shift + ctrl + L arrow  - select words   - ^C->   - shift + ctrl + R arrow  - select words     - ^up  - shift + up arrow   - select paragraph above cursor   - ^dn   - shift + dn arrow   - select paragraph below  cursor      - ^Home  - shift + Home   - select text between cursor and beginning of line   - ^End   - shift + End   - select text between cursor and end of line     - ^C/Home - shift + ctrl + Home   - select text between cursor and beginning of next text entry field -------  - ^C/End   - shift + ctrl + End   - select text between cursor and end of next text entry field     - ^pg dn   - shift + page down   - select frame of text below cursor   - ^pg up   - shift + page up   - select frame of text above cursor     - C/A  - Ctrl + A    - select  "all text               Combine in sequence -     - ^End      - select to end line   - ^dn     - select line below   - (not need \"delete\" first, just start typing)               Editing -     - C/C   - Ctrl + C*   - copy selected text   - C/Insert  - Ctrl + Insert   - copy selected text     - C/X  -Ctrl + X*    - cut/delete selected text   - ^Del  - shift + Delete   - cut/delete selected text     - C/V   - Ctrl + V*   - paste   - ^Insert   - shift + Insert   - paste     - C/Z   - Ctrl + Z*   - undo   - C/Y   - Ctrl + Y*   - redo     - C/Z   - Ctrl + Z*   - go back 1 step   - A/<--  - Alt + Backspace   - go back 1 step (same as Ctrl + Z)                Formatting -     - C/B  - Ctrl + B*   - Bold   - C/I  - Ctrl + I*    - Italics   - C/U  - Ctrl .+ U*  - Underline               Functions -     - C/F   - Ctrl + F    - Find   - F3   - F3    - Find Next   - ^F3  - shift + F3   - Find Previous     - C/O  - Ctrl + O   - Open   - C/S   - Ctrl + S   - Save   - C/N   - Ctrl + N   - New doc   - C/P   - Ctrl + P   - Print     - A   - Alt**    - activate application's menu bar   - A/F   - Alt + F    - open File menu   - A/E  - Alt + E    - open Edit menu   - A/V  - Alt + V    - open View menu       ______________________________                      "

## 2017-10-11 NOTE — PLAN OF CARE
"Problem: Patient Care Overview  Goal: Plan of Care/Patient Progress Review  OT: Pt is a 89 year old female who was admitted on 10/11 after a fall and was found to have a R distal radius impacted fracture and R hip subcutaneous hematoma. Per ortho note, R UE/LE WBAT. Prior to admission pt was residing alone in a condo and reports independence with all ADLs. Family assists with cleaning, laundry and transportation. She utilizes condo transportation for grocery shopping. Pt's daughter was present for session and reports she is unable to provide increased assistance at discharge due to her work schedule.      Discharge Planner OT   Patient plan for discharge: \"I prefer to go home\"  Current status: Supine > sit with CGA. Sit <> stand from EOB with FWW and CGA. Pt took 4 steps from bed and demonstrated R knee buckling and shakiness. She also reported feeling nauseous. Min assist needed for balance. Sit > supine with mod assist. Educated pt on R UE positioning. Discussed discharge plans with pt and daughter who was present for session. Educated both on discharge options (home, TCU, HH, etc) and daughter reported she is unable to provide increased assistance at discharge due to her work schedule. Pt reported she prefers to discharge home, but understands it might not be a safe option at this time. Pt reported increased pain in R thumb due to splint placement/tightness. RN notified. Limited participation during session due to pt not having pain meds since ER and report of \"very high\" pain.   Barriers to return to prior living situation: Lives alone, R hand dominant, strength, balance, pain, endurance   Recommendations for discharge: TCU   Rationale for recommendations: Pt would benefit from TCU at discharge in order to maximize safety and independence with ADL/IADLs and mobility.        Entered by: Radha Samuels 10/11/2017 4:18 PM         "

## 2017-10-11 NOTE — ED NOTES
[facility]  ED Nurse Handoff Report    ED Chief complaint: Hip Pain (right hip swelling after fall, did hit head but no LOC, mechanical fall)      ED Diagnosis:   Final diagnoses:   None       Code Status: to be addressed upon admission    Allergies:   Allergies   Allergen Reactions     Cephalexin Rash       Activity level - Baseline/Home:  Independent    Activity Level - Current:   Stand with Assist of 2     Needed?: No    Isolation: No  Infection: Not Applicable    Bariatric?: No    Vital Signs:   Vitals:    10/11/17 0200 10/11/17 0215 10/11/17 0230 10/11/17 0231   BP: 142/64 151/68 154/67    Pulse: 63 66  75   Resp:    18   Temp:       TempSrc:       SpO2: 96% 94%  96%   Weight:       Height:           Cardiac Rhythm: ,        Pain level: 0-10 Pain Scale: 5    Is this patient confused?: Yes    Patient Report: Initial Complaint: Fall with right hip pain  Focused Assessment: Pt had mechanical fall at home in her own apartment where she lives alone. Fell backwards and landed on right hip and did hit head on ground. Denies LOC. Pt did not c/o pain in right wrist but does have fracture noted there and splint was applied. Tried to have pt sit to try to ambulate and pt became diaphoretic and nauseated with dry heaves. Pt c/o pain in right buttock and down right leg with moving. Pt is pleasantly confused, tries to cover for herself and make excuses. Normally walks with walker. Bruising Rt Buttox  Tests Performed: Labs, EKG, Xrays  Abnormal Results: pending  Treatments provided: 500cc NS bolus, Morphine 4mg, Zofran 4mg    Family Comments: daughter and son present but will be going home    OBS brochure/video discussed/provided to patient: N/A    ED Medications:   Medications   morphine (PF) injection 4 mg (4 mg Intravenous Given 10/11/17 0258)   ondansetron (ZOFRAN) injection 4 mg (4 mg Intravenous Given 10/11/17 0255)   0.9% sodium chloride BOLUS (500 mLs Intravenous New Bag 10/11/17 030)        Drips infusing?:  No      ED NURSE PHONE NUMBER: [phone]

## 2017-10-11 NOTE — IP AVS SNAPSHOT
` `     [place] [address] [facility]: [phone]                 INTERAGENCY TRANSFER FORM - NOTES (H&P, Discharge Summary, Consults, Procedures, Therapies)   10/11/2017                    Hospital Admission Date: 10/11/2017  SEGUN KELLY   : 1928  Sex: Female        Patient PCP Information     Provider PCP Type    Kylee SHARMILA [facility]      History & Physicals     No notes of this type exist for this encounter.      Discharge Summaries     No notes of this type exist for this encounter.         Consult Notes      Consults by Dino Aceves MD at 10/11/2017  9:29 AM     Author:  Dino Aceves MD Service:  Spinal Surgery Author Type:  Physician    Filed:  10/11/2017  9:29 AM Date of Service:  10/11/2017  9:29 AM Creation Time:  10/11/2017  9:12 AM    Status:  Signed :  Dino Aceves MD (Physician)     Consult Orders:    1. Orthopedic Surgery IP Consult: Patient to be seen: Routine - within 24 hours; possible Rt thigh hematoma from fall, possible Rt wrist fracture.; Consultant may enter orders: Yes [[id number]] ordered by Nathaniel Ibarra MD at 10/11/17 0522                [facility]    Orthopedic Surgery Consultation    Date of Admission:  10/11/2017  Date of Consult (When I saw the patient): 10/11/17    Assessment & Plan     Assessment:  Right distal radius impacted fracture nondisplaced  Right subcutaneous Hematoma    Plan:  I would treat the hematoma with ice at this point, some compression.  It feels too firm to consider aspiration.  I think it should be able to be managed nonoperatively at this point.  No evidence of sciatic nerve contusion    Plan to change the splint in two or three days on her right forearm probably can get by with removable wrist brace.  I'll follow-up tomorrow  She can be weightbearing as tolerated and ambulate as tolerated.  Up with assistance.    Dino Aceves MD    Code Status    Full Code     Reason for Consult    Reason for consult: Pain in right hip after a fall and right wrist pain    Primary Care Physician   Kylee Liang    Chief Complaint   Right hip pain and right arm pain        History of Present Illness   History is obtained from the patient and the chart    Eri Puckett is a 89 year old female who presents with complaints of right hip pain and right arm pain after a fall.  She states that she was at home.  She was getting ready to have some workers in her house.  She was organizing her closet.  She stepped backward and lost her balance.  She and her head right wrist and landed on her right hip.  She pushed her Cell[employer] button and paramedics came to the house.  She was brought to [facility] emergency room.  She was evaluated.  Had a CT scan of her head, CT scan of her hip and pelvis and x-rays of her right wrist.    CT scan of her head was negative, right wrist x-rays show a possible impacted distal radius fracture.  CT scan of her right hip showed a hematoma.  She was placed into a splint on her right wrist and admitted for evaluation of her hip.    Her past medical history is significant for lymphoma, type 2 diabetes, hypertension, glaucoma, peripheral neuropathy, degenerative joint disease.  She lives alone, is  and a senior apartment building.  She normally ambulates independently but uses a walker at times.    She's complaining of some right hip pain but she says is much better today.  She is in a splint the right arm.  No significant pain.  Denies any fevers, chills, sweats, loss of consciousness, chest pain, nausea, numbness or tingling in her leg.      Past Medical History   I have reviewed this patient's medical history and updated it with pertinent information if needed.   Past Medical History:   Diagnosis Date     Asthma     mild  2013     Cancer (H)     hodkins lymphoma 2009 with recurent now     Diabetes mellitus     hx type 2 40 years       Past Surgical  History   I have reviewed this patient's surgical history and updated it with pertinent information if needed.  History reviewed. No pertinent surgical history.    Prior to Admission Medications   Prior to Admission Medications   Prescriptions Last Dose Informant Patient Reported? Taking?   ALLOPURINOL PO 10/10/2017 at am Daughter Yes Yes   Sig: Take 100 mg by mouth daily   ASPIRIN EC PO 10/9/2017 at pm Daughter Yes Yes   Sig: Take 81 mg by mouth daily   Carboxymethylcellul-Glycerin (REFRESH OPTIVE) 1-0.9 % GEL 10/10/2017 at am Daughter Yes Yes   Sig: Apply to eye 2 times daily Gel to left eye   Cholecalciferol (VITAMIN D3 PO) 10/9/2017 at pm Daughter Yes No   Sig: Take 2,000 Units by mouth daily   Cyanocobalamin (VITAMIN B 12 PO) 10/9/2017 at noon Daughter Yes Yes   Sig: Take 1,000 mcg by mouth daily   FLUOXETINE HCL PO 10/10/2017 at am Daughter Yes Yes   Sig: Take 20 mg by mouth 2 times daily   LISINOPRIL PO 10/9/2017 at noon  Yes Yes   Sig: Take 15 mg by mouth daily   Linagliptin (TRADJENTA PO) 10/9/2017 at pm Daughter Yes Yes   Sig: Take 2.5 mg by mouth daily   Multiple Vitamins-Minerals (CENTRUM SILVER) per tablet 10/9/2017 at noon Daughter Yes Yes   Sig: Take 1 tablet by mouth daily   OMEPRAZOLE PO 10/9/2017 at noon Daughter Yes Yes   Sig: Take 20 mg by mouth every morning   Polyethylene Glycol 400 (BLINK TEARS OP)  at prn Daughter Yes Yes   Sig: Apply 1 drop to eye as needed (to both eye as needed)   Rosuvastatin Calcium (CRESTOR PO) 10/9/2017 at pm Daughter Yes Yes   Sig: Take 20 mg by mouth daily   brimonidine (ALPHAGAN-P) 0.15 % ophthalmic solution 10/10/2017 at am Daughter Yes Yes   Sig: Place 1 drop Into the left eye 2 times daily   calcium-vitamin D (CALTRATE) 600-400 MG-UNIT per tablet 10/10/2017 at am Daughter Yes Yes   Sig: Take 1 tablet by mouth 2 times daily      Facility-Administered Medications: None     Allergies   Allergies   Allergen Reactions     Cephalexin Rash       Social History   I have  "reviewed this patient's social history and updated it with pertinent information if needed. Eri Puckett  reports that she has never smoked. She has never used smokeless tobacco. She reports that she does not drink alcohol or use illicit drugs.   She has been  for 15 years, was  for 48 years, has family that helps her with medications and pain pills.    Family History   I have reviewed this patient's family history and updated it with pertinent information if needed.   No family history on file.    Review of Systems   The 10 point Review of Systems is negative other than noted in the HPI or here.     Physical Exam   Temp: 97.8  F (36.6  C) Temp src: Oral BP: 133/61 Pulse: 94   Resp: 18 SpO2: 99 % O2 Device: Nasal cannula Oxygen Delivery: 3 LPM  Vital Signs with Ranges  Temp:  [97.8  F (36.6  C)-98.3  F (36.8  C)] 97.8  F (36.6  C)  Pulse:  [59-94] 94  Resp:  [18] 18  BP: (120-154)/(61-83) 133/61  SpO2:  [94 %-100 %] 99 %  160 lbs 0 oz    Constitutional: Alert , Co-opertive, she is laying in bed.  Comfortable.  Skin: She has a relatively large ecchymosis over her right posterior buttock and gluteal area.  Mild tenderness.  There is palpable firmness.  Obvious hematoma measuring probably 6\" x 4\".  It is firm and feels organized  Motor: She is able do active straight leg raising test.  Strength is good.  Sensation: She has normal sensation in the sciatic nerve distribution  Circulation: No significant edema in her leg  I was able to put her hip through range of motion without pain.  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  No pain with internal or external rotation.    Examination of her right forearm shows she is in a splint with an Ace bandage.  CMS is intact.  No pain with range of motion of the finger.  Splint appears to be fitting well.    Data   Results for orders placed or performed during the hospital encounter of 10/11/17 (from the past 24 hour(s))   CBC with platelets + " differential   Result Value Ref Range    WBC 4.9 4.0 - 11.0 10e9/L    RBC Count 4.37 3.8 - 5.2 10e12/L    Hemoglobin 12.6 11.7 - 15.7 g/dL    Hematocrit 38.2 35.0 - 47.0 %    MCV 87 78 - 100 fl    MCH 28.8 26.5 - 33.0 pg    MCHC 33.0 31.5 - 36.5 g/dL    RDW 13.5 10.0 - 15.0 %    Platelet Count 135 (L) 150 - 450 10e9/L    Diff Method Automated Method     % Neutrophils 63.6 %    % Lymphocytes 18.4 %    % Monocytes 12.9 %    % Eosinophils 4.7 %    % Basophils 0.2 %    % Immature Granulocytes 0.2 %    Nucleated RBCs 0 0 /100    Absolute Neutrophil 3.1 1.6 - 8.3 10e9/L    Absolute Lymphocytes 0.9 0.8 - 5.3 10e9/L    Absolute Monocytes 0.6 0.0 - 1.3 10e9/L    Absolute Eosinophils 0.2 0.0 - 0.7 10e9/L    Absolute Basophils 0.0 0.0 - 0.2 10e9/L    Abs Immature Granulocytes 0.0 0 - 0.4 10e9/L    Absolute Nucleated RBC 0.0    Comprehensive metabolic panel   Result Value Ref Range    Sodium 141 133 - 144 mmol/L    Potassium 3.8 3.4 - 5.3 mmol/L    Chloride 104 94 - 109 mmol/L    Carbon Dioxide 29 20 - 32 mmol/L    Anion Gap 8 3 - 14 mmol/L    Glucose 148 (H) 70 - 99 mg/dL    Urea Nitrogen 21 7 - 30 mg/dL    Creatinine 1.20 (H) 0.52 - 1.04 mg/dL    GFR Estimate 42 (L) >60 mL/min/1.7m2    GFR Estimate If Black 51 (L) >60 mL/min/1.7m2    Calcium 7.9 (L) 8.5 - 10.1 mg/dL    Bilirubin Total 0.4 0.2 - 1.3 mg/dL    Albumin 3.1 (L) 3.4 - 5.0 g/dL    Protein Total 6.0 (L) 6.8 - 8.8 g/dL    Alkaline Phosphatase 49 40 - 150 U/L    ALT 21 0 - 50 U/L    AST 22 0 - 45 U/L   Lipase   Result Value Ref Range    Lipase 131 73 - 393 U/L   Troponin I (now)   Result Value Ref Range    Troponin I ES <0.015 0.000 - 0.045 ug/L   XR Pelvis and Hip Right 1 View    Narrative    PELVIS AND RIGHT HIP 2 VIEWS  10/11/2017 1:03 AM     HISTORY: Pain.    COMPARISON: None.      Impression    IMPRESSION: No visualized acute fracture, malalignment or other acute  osseous abnormality of the visualized portions of the pelvis or right  hip.     YOANDY AJ MD    Wrist XR, G/E 3 views, right    Narrative    RIGHT WRIST 3 VIEWS  10/11/2017 1:04 AM     HISTORY: Fall. Pain.    COMPARISON: None.      Impression    IMPRESSION:  1. A subtle linear transverse band of relatively increased density  within the distal right radial metaphysis. This is not convincing for  an acute fracture, but a minimally impacted acute fracture cannot be  entirely excluded.  2. Normal alignment of the right wrist.  3. Diffuse osteopenia.  4. Severe degenerative changes in the right first carpometacarpal  joint.    YOANDY AJ MD   Head CT w/o contrast    Narrative    CT HEAD WITHOUT CONTRAST  10/11/2017 1:19 AM     HISTORY: Fall. Vomiting.    COMPARISON: 12/6/2007.    TECHNIQUE: Without intravenous contrast, helical sections were  acquired through the brain. Coronal reconstructions were generated.  Radiation dose for this scan was reduced using automated exposure  control, adjustment of the mA and/or kV according to the patient's  size, or iterative reconstruction technique.    FINDINGS: Mild diffuse cerebral atrophy. Mild bilateral  periventricular white matter low attenuation, likely relating to  chronic small vessel ischemic disease. No intra-axial mass, mass  effect or midline shift. Normal gray-white matter differentiation. No  visualized acute intracranial hemorrhage. The cerebral ventricles are  normal in caliber. The basal cisterns are patent. No extra-axial fluid  collection. The visualized portions of the paranasal sinuses and  mastoid air cells are unremarkable.      Impression    IMPRESSION: No evidence of acute intracranial abnormality.    YOANDY AJ MD   EKG 12 lead   Result Value Ref Range    Interpretation ECG Click View Image link to view waveform and result    CT Hip Right w/o Contrast    Narrative    CT PELVIC BONE WITHOUT CONTRAST  10/11/2017 5:33 AM     HISTORY: Fall. Pain. Evaluate for occult fracture. Large hematoma on  exam.    COMPARISON: None.    TECHNIQUE: Without  intravenous or oral contrast, helical sections were  acquired from the iliac crests through the pubic symphysis. Coronal  and sagittal reconstructions were generated. Radiation dose for this  scan was reduced using automated exposure control, adjustment of the  mA and/or kV according to the patient's size, or iterative  reconstruction technique.    FINDINGS: No visualized acute fractures or malalignment of the pelvic  bone or right hip. An irregular-shaped high attenuation mass-like  structure is present in the deep subcutaneous tissues of the lateral  aspect of the right hip measuring 16 cm in anteroposterior dimension,  6 cm in transverse dimension, and 15 cm in craniocaudal dimension  (series 3 image 84). There are hazy opacities in the surrounding fat.  This likely represents a hematoma.    The visualized portions of the small and large bowel are normal in  caliber. A few colonic diverticula are present without evidence of  diverticulitis. The uterus is not visualized. No enlarged lymph nodes  or free fluid in the pelvis. Very small bilateral inguinal hernias  containing fat.      Impression    IMPRESSION:  1. No visualized acute fracture or malalignment of the pelvis or right  hip.  2. Large hematoma in the subcutaneous tissues at the lateral aspect of  the right hip.    YOANDY AJ MD   EKG 12-lead, tracing only   Result Value Ref Range    Interpretation ECG Click View Image link to view waveform and result        Imaging:  I reviewed her wrist x-rays.  There is a nondisplaced slightly impacted fracture of the distal radius.    CT scan of her hips did not reveal any fracture.  She does have a hematoma.  It is a subcutaneous.  Measures 16 cm x 15 cm x 6 cm.  No significant arthritis.[DH1.1]                   Revision History        User Key Date/Time User Provider Type Action    > DH1.1 10/11/2017  9:29 AM Dino Aceves MD Physician Sign                     Progress Notes - Physician (Notes from  10/09/17 through 10/12/17)      Progress Notes by Zuleima Ramos MSW at 10/12/2017  4:08 PM     Author:  Zuleima Ramos MSW Service:  (none) Author Type:      Filed:  10/12/2017  4:11 PM Date of Service:  10/12/2017  4:08 PM Creation Time:  10/12/2017  4:08 PM    Status:  Signed :  Zuleima Ramos MSW ()         LYRIC    D: Dr. Jacob expressed concern about patient returning home without 24-hour care. LYRIC and CC met with patient and her daughter to discuss applying for MA (which would cover TCU). Patient's daughter was given the application. LYRIC contacted facilities to see who accepts MA; [facility] and [facility] do not. [facility] does. LYRIC also put in a referral for [facility], who accepts MA pending.     P: Continue to follow.[NO1.1]     Revision History        User Key Date/Time User Provider Type Action    > NO1.1 10/12/2017  4:11 PM Zuleima Ramos MSW  Sign            Progress Notes by Zuleima Ramos MSW at 10/12/2017  2:06 PM     Author:  Zuleima Ramos MSW Service:  (none) Author Type:      Filed:  10/12/2017  2:29 PM Date of Service:  10/12/2017  2:06 PM Creation Time:  10/12/2017  2:06 PM    Status:  Signed :  Zuleima Ramos MSW ()         Care Transition Initial Assessment - LYRIC  Reason For Consult: discharge planning  Met with: Patient and Family    Active Problems:    Fall         DATA  Lives With: alone  Living Arrangements: condominium    Identified issues/concerns regarding health management: SW was consulted for discharge planning. Patient is Eri, an 89 year-old female who was admitted on 10/11 after a fall. Her tentative discharge date is 10/12 or 10/13. LYRIC met with patient and her daughter, and reviewed medical record. Per physical therapy and physician notes, the recommendation is TCU. Initially, patient and her daughter were on board with this plan, and LYRIC put in referrals for [facility] and [facility]. LYRIC  then noted that patient will likely not get a 3-day stay. Patient declined to private pay for TCU,[NO1.1] choosing homecare instead. She said she also has services through her senior living, including a grocery service. SW also talked to patient's daughter, who expressed concern about patient not having 24-hour care. SW explained how private pay would work. Patient's daughter asked if patient could stay to get a 3-day stay. SW explained it is the doctor who decides when she is ready to discharge, and that she would need a medical reason to need to stay.[NO1.2]               Transportation Available: family or friend will provide (pt does not drive at baseline )    ASSESSMENT  Cognitive Status:[NO1.1]  awake, alert and oriented[NO1.2]  Concerns to be addressed:[NO1.1] Discharge planning[NO1.2].     PLAN  Financial costs for the patient includes[NO1.1] possible private-pay for TCU[NO1.2].  Patient given options and choices for discharge[NO1.1] Yes[NO1.2].  Patient/family is agreeable to the plan?[NO1.1] Patient is agreeable to going home; patient's daughter has concerns.  P[NO1.2]atient Goals and Preferences:[NO1.1] Discharge to home with assist[NO1.2].  Patient anticipates discharging to:[NO1.1] home with assist[NO1.2].[NO1.1]    Zuleima Ramos[NO1.3], ARCADIO RUSSO[NO1.2]           Revision History        User Key Date/Time User Provider Type Action    > NO1.3 10/12/2017  2:29 PM Zuleima Ramos MSW  Sign     NO1.2 10/12/2017  2:23 PM Zuleima Ramos MSW       NO1.1 10/12/2017  2:06 PM Zuleima Ramos MSW              Progress Notes by Radha Samuels OT at 10/11/2017  4:10 PM     Author:  Radha Samuels OT Service:  (none) Author Type:  Occupational Therapist    Filed:  10/11/2017  4:10 PM Date of Service:  10/11/2017  4:10 PM Creation Time:  10/11/2017  4:10 PM    Status:  Signed :  Radha Samuels OT (Occupational Therapist)          10/11/17 1538   Quick Adds  "  Type of Visit Initial Occupational Therapy Evaluation   Living Environment   Lives With alone   Living Arrangements condominium   Home Accessibility tub/shower is not walk in   Number of Stairs to Enter Home 0   Number of Stairs Within Home 0   Transportation Available family or friend will provide  (pt does not drive at baseline )   Living Environment Comment Elevator access. Pt's daughter was present for session and reported she is unable to provide assistance at discharge due to her work schedule. Bathroom is equipped with a tub/shower with a shower chair. Standard toilet.    Self-Care   Usual Activity Tolerance good   Current Activity Tolerance fair   Equipment Currently Used at Home shower chair;walker, rolling   Activity/Exercise/Self-Care Comment Pt uses a FWW for mobility. She also has access to a 4WW and wheelchair.    Functional Level Prior   Ambulation 1-->assistive equipment   Transferring 1-->assistive equipment   Toileting 0-->independent   Bathing 0-->independent   Dressing 0-->independent   Eating 0-->independent   Communication 0-->understands/communicates without difficulty   Swallowing 0-->swallows foods/liquids without difficulty   Cognition 0 - no cognition issues reported   Fall history within last six months yes   Number of times patient has fallen within last six months 1   General Information   Onset of Illness/Injury or Date of Surgery - Date 10/11/17   Referring Physician Ketan   Patient/Family Goals Statement \"I would prefer to go home\"   Additional Occupational Profile Info/Pertinent History of Current Problem Pt is a 89 year old female who was admitted on 10/11 after a fall and was found to have a R distal radius impacted fracture and R hip subcutaneous hematoma.    Precautions/Limitations fall precautions   Weight-Bearing Status - RUE weight-bearing as tolerated   Weight-Bearing Status - RLE weight-bearing as tolerated   Pain Assessment   Patient Currently in Pain Yes, see Vital Sign " "flowsheet  (\"very high\")   Transfer Skill: Bed to Chair/Chair to Bed   Level of [place]: Bed to Chair minimum assist (75% patients effort)   Weight-Bearing Restrictions weight-bearing as tolerated   Assistive Device - Transfer Skill Bed to Chair Chair to Bed Rehab Eval rolling walker  (FWW)   Transfer Skill: Sit to Stand   Level of [place]: Sit/Stand contact guard   Transfer Skill: Sit to Stand weight-bearing as tolerated   Assistive Device for Transfer: Sit/Stand rolling walker  (FWW)   Transfer Skill: Toilet Transfer   Level of [place]: Toilet minimum assist (75% patients effort)   Assistive Device grab bars;rolling walker  (FWW)   Bathing   Level of [place] maximum assist (25% patients effort)   Upper Body Dressing   Level of [place]: Dress Upper Body moderate assist (50% patients effort)   Lower Body Dressing   Level of [place]: Dress Lower Body maximum assist (25% patients effort)   Toileting   Level of [place]: Toilet moderate assist (50% patients effort)   Grooming   Level of [place]: Grooming minimum assist (75% patients effort)   Eating/Self Feeding   Level of [place]: Eating minimum assist (75% patients effort)   Instrumental Activities of Daily Living (IADL)   Previous Responsibilities meal prep;medication management   Activities of Daily Living Analysis   Impairments Contributing to Impaired Activities of Daily Living balance impaired;pain;strength decreased   General Therapy Interventions   Planned Therapy Interventions ADL retraining;IADL retraining;transfer training;progressive activity/exercise   Clinical Impression   Criteria for Skilled Therapeutic Interventions Met yes, treatment indicated   OT Diagnosis Decreased independence with ADL/IADLs   Influenced by the following impairments Endurance, strength, pain, balance   Assessment of Occupational Performance 5 or more Performance Deficits   Identified Performance Deficits Dressing, bathing, groom/hyg, " "toileting, transfers, ambulation    Clinical Decision Making (Complexity) Moderate complexity   Therapy Frequency daily   Predicted Duration of Therapy Intervention (days/wks) 3 days   Anticipated Discharge Disposition Transitional Care Facility   Risks and Benefits of Treatment have been explained. Yes   Patient, Family & other staff in agreement with plan of care Yes   [facility] TM \"6 Clicks\"   2016, Trustees of [facility], under license to [employer].  All rights reserved.   6 Clicks Short Forms Daily Activity Inpatient Short Form   [facility]  \"6 Clicks\" Daily Activity Inpatient Short Form   1. Putting on and taking off regular lower body clothing? 2 - A Lot   2. Bathing (including washing, rinsing, drying)? 2 - A Lot   3. Toileting, which includes using toilet, bedpan or urinal? 2 - A Lot   4. Putting on and taking off regular upper body clothing? 2 - A Lot   5. Taking care of personal grooming such as brushing teeth? 3 - A Little   6. Eating meals? 3 - A Little   Daily Activity Raw Score (Score out of 24.Lower scores equate to lower levels of function) 14   Total Evaluation Time   Total Evaluation Time (Minutes) 10[JH1.1]        Revision History        User Key Date/Time User Provider Type Action    > JH1.1 10/11/2017  4:10 PM Radha Samuels OT Occupational Therapist Sign            Progress Notes by Celso Oliveira DO at 10/11/2017  8:15 AM     Author:  Celso Oliveira DO Service:  Hospitalist Author Type:  Physician    Filed:  10/11/2017  2:21 PM Date of Service:  10/11/2017  8:15 AM Creation Time:  10/11/2017  8:15 AM    Status:  Signed :  Celso Oliveira DO (Physician)         [facility]  Hospitalist Progress Note        Celso Oliveira DO  10/11/2017        Interval History:[ZA1.1]      Patient with right hip pain reported. Discussed with patient and nursing staff.[ZA1.2]          Assessment and Plan:    " "    89-year-old woman with the above-mentioned medical problems, who was brought to the ER after a fall causing right hip and right wrist pain.[ZA1.1]     R[ZA1.2]ight radial wrist fracture, and also right thigh hematoma due to mechanical fall: The patient reports she had a mechanical fall. x-rays of her right hip and pelvis as well as right wrist[ZA1.1] were completed[ZA1.3].  Clinically, she also seems to have a right thigh hematoma.[ZA1.1]   -[ZA1.3] consult Orthopedic Surgery[ZA1.1]   - Pain control.[ZA1.3]    D[ZA1.2]ry heaving and vomiting:  Likely due to pain, which has improved:[ZA1.1]    -[ZA1.3] p.r.n. antiemetic, IV hydration, and follow electrolytes.     Hypertension and hyperlipidemia:  Prior to admission, she was on lisinopril and Crestor.[ZA1.1]    -[ZA1.3] lisinopril with holding parameters.[ZA1.1]    - hold[ZA1.3] Crestor for now    Diabetes mellitus, type 2:  Per our records, she is on glyburide and sitagliptin, medications to be verified.[ZA1.1]    -[ZA1.3] low-dose Lantus 5 units subq daily[ZA1.1]  -[ZA1.3] medium intensity sliding scale.      Dysthymia:[ZA1.1] Stable.   - Continue[ZA1.3] fluoxetine.      Mild thrombocytopenia, unknown baseline:  Could be due to hematoma.[ZA1.1]    - H[ZA1.3]old aspirin    Gout:[ZA1.1]    - Continue[ZA1.3] allopurinol     Mild intermittent asthma:  p.r.n. albuterol     Elevated creatinine, unknown baseline:  Unclear if this represents acute kidney injury versus chronic kidney disease stage III.[ZA1.1]   - Monitor.[ZA1.3]     DVT prophylaxis:  PCDs     Gastroesophageal reflux disease and gastrointestinal prophylaxis:[ZA1.1] Continue[ZA1.3] omeprazole[ZA1.1].[ZA1.3]     CODE: FULL CODE.       Disposition:[ZA1.1] Likely discharge on 10/12 if hemoglobin stable.[ZA1.3]                    Physical Exam:           Blood pressure 133/61, pulse 94, temperature 97.8  F (36.6  C), temperature source Oral, resp. rate 18, height 1.499 m (4' 11\"), weight 72.6 kg (160 lb), SpO2 " 99 %.    Vitals:    10/11/17 0022   Weight: 72.6 kg (160 lb)       Vital Sign Ranges  Temperature Temp  Av  F (36.7  C)  Min: 97.8  F (36.6  C)  Max: 98.3  F (36.8  C)   Blood pressure Systolic (24hrs), Av , Min:120 , Max:154        Diastolic (24hrs), Av, Min:61, Max:83      Pulse Pulse  Av.9  Min: 59  Max: 94   Respirations Resp  Av  Min: 18  Max: 18   Pulse oximetry SpO2  Av.3 %  Min: 94 %  Max: 100 %     Vital Signs with Ranges  Temp:  [97.8  F (36.6  C)-98.3  F (36.8  C)] 97.8  F (36.6  C)  Pulse:  [59-94] 94  Resp:  [18] 18  BP: (120-154)/(61-83) 133/61  SpO2:  [94 %-100 %] 99 %    I/O Last 3 Shifts:        I/O past 24 hours:     Intake/Output Summary (Last 24 hours) at 10/11/17 0815  Last data filed at 10/11/17 0800   Gross per 24 hour   Intake              100 ml   Output                0 ml   Net              100 ml     GENERAL: Alert and oriented. NAD. Conversational, appropriate.   HEENT: Normocephalic. EOMI. No icterus or injection. Nares normal.   LUNGS: Clear to auscultation. No dyspnea at rest.   HEART: Regular rate. Extremities perfused.   ABDOMEN: Soft, nontender, and nondistended. Positive bowel sounds.   EXTREMITIES: No LE edema noted.[ZA1.1] Wrist in brace, distal cms in tact. Ecchymosis to right thigh, stable.[ZA1.3]   NEUROLOGIC: Moves extremities x4 on command. No acute focal neurologic abnormalities noted.          Prior to Admission Medications:        Prescriptions Prior to Admission   Medication Sig Dispense Refill Last Dose     ASPIRIN EC PO Take 81 mg by mouth daily   Past Week at Unknown     ALLOPURINOL PO Take 100 mg by mouth daily   2013 at 700     Rosuvastatin Calcium (CRESTOR PO) Take 20 mg by mouth daily        OMEPRAZOLE PO Take 20 mg by mouth every morning   2013 at Unknown     Multiple Vitamins-Minerals (CENTRUM SILVER) per tablet Take 1 tablet by mouth daily   2013 at Unknown     SitaGLIPtin Phosphate (JANUVIA PO) Take 25 mg by  mouth daily   11/25/2013 at Unknown     calcium-vitamin D (CALTRATE) 600-400 MG-UNIT per tablet Take 1 tablet by mouth 2 times daily   11/26/2013 at Unknown     Cyanocobalamin (VITAMIN B 12 PO) Take 1,000 mcg by mouth daily   11/25/2013 at Unknown     Cholecalciferol (VITAMIN D3 PO) Take 2,000 Units by mouth daily   11/25/2013 at Unknown     FLUOXETINE HCL 10 MG OR CAPS one tablet whice a day   11/26/2013 at 700     GLYBURIDE 5 MG OR TABS 10 mg tablet   11/26/2013 at 700     ROBITUSSIN A-C  MG/5ML OR SYRP 1-2 tsp PO QHS PRN cough 4 oz 0 10/31/2013 at Unknown            Medications:        Current Facility-Administered Medications   Medication Last Rate     NaCl       Current Facility-Administered Medications   Medication Dose Route Frequency     insulin aspart  1-6 Units Subcutaneous Q4H     insulin glargine  5 Units Subcutaneous QAM AC     Current Facility-Administered Medications   Medication Dose Route Frequency     glucose  15-30 g Oral Q15 Min PRN    Or     dextrose  25-50 mL Intravenous Q15 Min PRN    Or     glucagon  1 mg Subcutaneous Q15 Min PRN     naloxone  0.1-0.4 mg Intravenous Q2 Min PRN     potassium chloride  20-40 mEq Oral Q2H PRN     potassium chloride  20-40 mEq Oral or Feeding Tube Q2H PRN     potassium chloride  10 mEq Intravenous Q1H PRN     potassium chloride with lidocaine  10 mEq Intravenous Q1H PRN     potassium chloride  20 mEq Intravenous Q1H PRN     acetaminophen  650 mg Oral Q4H PRN     acetaminophen  650 mg Rectal Q4H PRN     HYDROmorphone  0.2 mg Intravenous Q2H PRN     polyethylene glycol  17 g Oral Daily PRN     bisacodyl  10 mg Rectal Daily PRN     ondansetron  4 mg Oral Q6H PRN    Or     ondansetron  4 mg Intravenous Q6H PRN     albuterol  2.5 mg Nebulization Q2H PRN            Data:      Lab data reviewed.     Recent Labs  Lab 10/11/17  0030   HGB 12.6   MCV 87   *      POTASSIUM 3.8   CHLORIDE 104   CO2 29   BUN 21   CR 1.20*   ANIONGAP 8   GARRETT 7.9*   *    TROPI <0.015           Imaging:      Imaging data reviewed.     Dr. Celso Oliveira D.O.  [facility] Hospitalist  Pager [phone][ZA1.1]       Revision History        User Key Date/Time User Provider Type Action    > ZA1.3 10/11/2017  2:21 PM Celso Oliveira, DO Physician Sign     ZA1.2 10/11/2017  1:31 PM Celso Oliveira, DO Physician      ZA1.1 10/11/2017  8:15 AM Celso Oliveira,  Physician             Progress Notes by Vonda Haley PT at 10/11/2017  2:11 PM     Author:  Vonda Haley PT Service:  (none) Author Type:  Physical Therapist    Filed:  10/11/2017  2:11 PM Date of Service:  10/11/2017  2:11 PM Creation Time:  10/11/2017  2:11 PM    Status:  Signed :  Vonda Haley PT (Physical Therapist)          10/11/17 1100   Quick Adds   Type of Visit Initial PT Evaluation   Living Environment   Lives With alone   Living Arrangements condominium   Home Accessibility tub/shower is not walk in   Number of Stairs to Enter Home 0   Number of Stairs Within Home 0   Transportation Available family or friend will provide;agency transportation  (pt does not drive; uses family vs condo transport)   Self-Care   Usual Activity Tolerance good   Current Activity Tolerance fair   Regular Exercise no   Equipment Currently Used at Home walker, rolling;shower chair;grab bar   Functional Level Prior   Ambulation 0-->independent   Transferring 0-->independent   Toileting 0-->independent   Bathing 0-->independent   Dressing 0-->independent   Eating 0-->independent   Communication 0-->understands/communicates without difficulty   Swallowing 0-->swallows foods/liquids without difficulty   Cognition 0 - no cognition issues reported   Fall history within last six months yes   Number of times patient has fallen within last six months 1   Which of the above functional risks had a recent onset or change? ambulation;transferring;toileting;bathing;dressing   Prior Functional Level Comment  "Pt lives alone in a senior apartment, elevator access. Pt reports she is IND for functional mobility without AD at baseline, later states she \"sometimes\" uses her FWW. Pt's family assists with cleaning, medications, transportation; pt uses facility transport to do her own grocery shopping.   General Information   Onset of Illness/Injury or Date of Surgery - Date 10/11/17   Referring Physician Dino Aceves MD   Patient/Family Goals Statement To go home   Pertinent History of Current Problem (include personal factors and/or comorbidities that impact the POC) Pt is an 88yo female admitted after fall, found to have R distal radius impacted fracture and R hip subcutaneous hematoma. Per ortho note, pt can be WBAT on RUE in splint and ambulate as tolerated with assistance.    Precautions/Limitations fall precautions   Weight-Bearing Status - RUE weight-bearing as tolerated   Weight-Bearing Status - RLE weight-bearing as tolerated   General Observations Pt resting in bed, RUE in splint/cast, family at bedsdie. Pt with IV   General Info Comments Activity: per ortho note, pt can be WBAT on RUE with splint, can ambulate as tolerated with assistance   Cognitive Status Examination   Orientation orientation to person, place and time   Level of Consciousness alert   Follows Commands and Answers Questions 100% of the time;able to follow multistep instructions   Personal Safety and Judgment intact   Integumentary/Edema   Integumentary/Edema Comments hematoma to R hip   Posture    Posture Forward head position;Protracted shoulders   Range of Motion (ROM)   ROM Comment BLE WNL with AAROM, limited by pain in R hip   Strength   Strength Comments RLE limited by pain, LLE grossly 4/5   Bed Mobility   Bed Mobility Comments Trice supine>sit with HOB elevated and with rail   Transfer Skills   Transfer Comments Trice sit>stand to FWW   Gait   Gait Comments unable to assess, pt becoming diaphoretic and pale when standing from commode prior " "to initiating ambulation   Balance   Balance Comments Fairly good sitting balance, decreased standing balance   Sensory Examination   Sensory Perception Comments Denies N/T   Coordination   Coordination no deficits were identified   Muscle Tone   Muscle Tone no deficits were identified   General Therapy Interventions   Planned Therapy Interventions balance training;bed mobility training;gait training;neuromuscular re-education;ROM;strengthening;stretching;transfer training;progressive activity/exercise;home program guidelines   Clinical Impression   Criteria for Skilled Therapeutic Intervention yes, treatment indicated   PT Diagnosis Impaired gait   Influenced by the following impairments Pain, weakness, decreased ROM, fatigue, decreased activity tolerance, syncopal and near syncopal episodes during hospitalization   Functional limitations due to impairments Decreased safety and independence with functional transfers, gait   Clinical Presentation Evolving/Changing   Clinical Presentation Rationale syncopal/ near syncopal episodes   Clinical Decision Making (Complexity) Low complexity   Therapy Frequency` daily   Predicted Duration of Therapy Intervention (days/wks) 4 days   Anticipated Discharge Disposition Transitional Care Facility;Home with Home Therapy;Home with Assist   Risk & Benefits of therapy have been explained Yes   Patient, Family & other staff in agreement with plan of care Yes   [facility] [employer] TM \"6 Clicks\"   2016, Trustees of [facility], under license to [employer].  All rights reserved.   6 Clicks Short Forms Basic Mobility Inpatient Short Form   [facility] AM-PAC  \"6 Clicks\" V.2 Basic Mobility Inpatient Short Form   1. Turning from your back to your side while in a flat bed without using bedrails? 3 - A Little   2. Moving from lying on your back to sitting on the side of a flat bed without using bedrails? 3 - A Little   3. Moving to and from a bed to a chair (including a " "wheelchair)? 3 - A Little   4. Standing up from a chair using your arms (e.g., wheelchair, or bedside chair)? 3 - A Little   5. To walk in hospital room? 3 - A Little   6. Climbing 3-5 steps with a railing? 2 - A Lot   Basic Mobility Raw Score (Score out of 24.Lower scores equate to lower levels of function) 17   Total Evaluation Time   Total Evaluation Time (Minutes) 10[KJ1.1]        Revision History        User Key Date/Time User Provider Type Action    > KJ1.1 10/11/2017  2:11 PM Vonda Haley, PT Physical Therapist Sign            ED Provider Notes by Curtis Lucas MD at 10/11/2017 12:19 AM     Author:  Curtis Lucas MD Service:  Emergency Medicine Author Type:  Physician    Filed:  10/11/2017  7:08 AM Date of Service:  10/11/2017 12:19 AM Creation Time:  10/11/2017 12:34 AM    Status:  Signed :  Curtis Lucas MD (Physician)           History     Chief Complaint:  Hip pain post-fall    HPI[SB1.1]     Available HPI obtained from patient, EMS and chart review.[SB1.2]    Eri Puckett is a 89 year old female with 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     She states she was organizing her closet, when she stepped on \"one of those metal things by the door\", lost her balance and felt backwards. She reports she hit the back of her head, but did not lose consciousness and does not have amnesia of the episode. She was able to ambulate without assistance.    Upon EMS arrival, patient had an episode of emesis, so they decided to transport her to the ED.   At the ED she complains of right[SB1.1] wrist pain[SB1.2], but no leg pain, weakness, numbness or tingling. No current headache, back or neck pain[SB1.1], chest pain or SOB[SB1.2]. She denies recent cough, fever, sore throat or other complaints.   Patient takes low-dose ASA, but no " anticoagulants.     Allergies:  Cephalexin      Medications:    ASA  Allopurinol   Rosuvastatin  Omeprazole[SB1.1]   Fluoxetine  Linagliptin  Lisinopril  Rituximab   Vitamins[SB1.2]    Past Medical History:    Asthma[SB1.1], mild intermittent[SB1.2]  DM[SB1.1] type 2  HTN  Dyslipidemia  Dysthymia  Lymphoma in remission (2007)  BCC, nose, s/p Mohs procedure   Peripheral neuropathy   Degenerative arthritis of C-spine  Gout  OA of knee  Glaucoma   Aphakia OS[SB1.2]    Past Surgical History:[SB1.1]    Mohs procedure  TKA  Cholecystectomy  Breast biopsy  Cataract surgery and vitrectomy, OS[SB1.2]    Family History:[SB1.1]    Lung cancer in nonsmoker  DM  Glaucoma  Breast cancer[SB1.2]     Social History:  Marital Status:    Smoking status: negative   Alcohol status: negative   Patient presents via EMS, accompanied by family.  PCP: Kylee Liang      Review of Systems   Constitutional: Negative for[SB1.1] fever[SB1.2].   HENT: Negative for[SB1.1] sore throat[SB1.2].    Respiratory: Negative for[SB1.1] cough[SB1.2] and[SB1.1] shortness of breath[SB1.2].    Cardiovascular: Negative for[SB1.1] chest pain[SB1.2].   Musculoskeletal: Negative for[SB1.1] back pain[SB1.2] and[SB1.1] neck pain[SB1.2].[SB1.1]        Positive for right wrist and hip pain[SB1.2]    Neurological: Negative for[SB1.1] weakness[SB1.2],[SB1.1] numbness[SB1.2] and[SB1.1] headaches[SB1.2].[SB1.1]        Positive for head trauma, negative for LOC or amnesia of the episode.   All other systems reviewed and are negative[SB1.2].      Physical Exam[SB1.1]     Patient Vitals for the past 24 hrs:   BP Temp Temp src Pulse Resp SpO2 Height Weight   10/11/17 0231 - - - 75 18 96 % - -   10/11/17 0230 154/67 - - - - - - -   10/11/17 0215 151/68 - - 66 - 94 % - -   10/11/17 0200 142/64 - - 63 - 96 % - -   10/11/17 0145 140/72 - - 63 - 96 % - -   10/11/17 0131 - - - 61 18 95 % - -   10/11/17 0130 135/61 - - - - - - -   10/11/17 0045 - - - 59 - 97 % - -  "  10/11/17 0030 - - - 59 - 97 % - -   10/11/17 0022 121/83 98.3  F (36.8  C) Oral 62 18 96 % 1.499 m (4' 11\") 72.6 kg (160 lb)[SB1.3]       Physical Exam[SB1.1]  Constitutional: Appears well-developed and well-nourished. Cooperative.    HENT:   Head: Atraumatic.   Mouth/Throat: Oropharynx is without erythema or exudate and mucous membranes are moist.   Eyes: Conjunctivae normal and EOM are normal. Pupils are equal, round, and reactive to light.   Neck: Normal range of motion. Neck supple.   Cardiovascular: Normal rate, regular rhythm, normal heart sounds and radial and dorsalis pedis pulses are 2+ and symmetric.   Pulmonary/Chest: Effort normal and breath sounds normal.   Abdominal: Soft. Bowel sounds are normal. No splenomegaly or hepatomegaly. No tenderness. No rebound.  Musculoskeletal: No midline tenderness on neck or back. RUE: There are[SB1.2] 2 bruises on[SB1.1] right[SB1.4] forearm, no[SB1.1] bony[SB1.4] tenderness on elbow, wrist or hand. Full ROM.[SB1.1] RLE:[SB1.2] T[SB1.1]here is t[SB1.2]enderness over right greater trochanter. Pelvis stable[SB1.1], n[SB1.2]ontender[SB1.1]. LLE: there is e[SB1.2]cchymosis over left 3rd toe, no bony tenderness, normal ROM. Capillary refill normal.[SB1.1]   Neurological: Alert. Normal strength. No cranial nerve deficit. GCS 15.  Skin: Skin is warm and dry.   Psychiatric: Normal mood and affect.[SB1.2]        Emergency Department Course     ECG ([SB1.1]03[SB1.4]:[SB1.1]03[SB1.4]:[SB1.1]07[SB1.4]):  Indication:[SB1.1] fall[SB1.4].   Rate[SB1.1] 70[SB1.4] bpm. DE interval[SB1.1] 192[SB1.4] ms. QRS duration[SB1.1] 68[SB1.4] ms. QT/QTc[SB1.1] 448/483[SB1.4] ms. R axis[SB1.1] 41[SB1.4].   Interpretation:[SB1.1] normal sinus rhythm. Low voltage QRS. Possible anterolateral infarct, age undetermined. Abnormal ECG.[SB1.4]  Agree with computer interpretation.[SB1.1]   New criteria for anterolateral infarct. Voltage decreased compared to ECG dated 12/27/07.[SB1.4]  Interpreted " at[SB1.1] 0305[SB1.4] by Curtis Lucas MD.     Imaging:  Radiology findings were communicated with the patient who voiced understanding of the findings.[SB1.1]    Head CT, without contrast, per radiology:    No evidence of acute intracranial abnormality.    Pelvis with Right Hip XR, per radiology:    No visualized acute fracture, malalignment or other acute osseous abnormality of the visualized portions of the pelvis or right hip.     Right Wrist XR, per radiology:     1. A subtle linear transverse band of relatively increased density within the distal right radial metaphysis. This is not convincing for an acute fracture, but a minimally impacted acute fracture cannot be entirely excluded.  2. Normal alignment of the right wrist.  3. Diffuse osteopenia.  4. Severe degenerative changes in the right first carpometacarpal joint.[SB1.2]    Laboratory:  Laboratory findings were communicated with the patient who voiced understanding of the findings.[SB1.1]    CBC: WBC 4.9, HGB 12.6,   CMP: Glucose 148, Creatinine 1.20, GFR 42, Ca 7.9, Albumin 3.1, Protein 6.0[SB1.2]   Creatinine 5/19/17: 0.96[SB1.4]  Lipase: 131[SB1.2]     Procedures:[SB1.1]    PROCEDURE: Splint Placement.    Volar fiberglass splint was applied to the right upper extremity and after placement I checked and adjusted the fit to ensure proper positioning.  The patient was more comfortable with the splint in place.  Sensation and circulation are intact after splint placement.[SB1.2]     Interventions:[SB1.1]  0255: Zofran 4mg, IV   0258: Morphine, 4 mg, IV   0301:  mL, IV    0345, Zofran 4mg, IV[SB1.2]      Emergency Department Course:  Nursing notes and vitals reviewed.  I performed an exam of the patient as documented above.     The patient was placed on continuous pulse oximetry and cardiac monitoring.   A peripheral IV was established and blood was drawn for laboratory testing, results above.[SB1.1]  X-rays and head CT obtained while in  the emergency department, findings above.      0330, patient was unable to ambulate in the ED.   0340, patient was feeling nauseated again, will provide Zofran.[SB1.2]  0355, rechecked patient and discussed plan of care with her and her family.     I rechecked the patient and the findings were explained to them, who consent to admission. I discussed the patient with Dr. Ibarra, who will admit the patient for further monitoring, evaluation and treatment.[SB1.4]      Impression & Plan      Medical Decision Making:[SB1.1]  Eri Puckett is a 89 year old female who presents for evaluation following a fall.  The fall was clearly a mechanical in nature based on description.  ECG was obtained nonetheless, which showed no evidence of arrhythmias or acute ischemic changes. Laboratory evaluation was significant for elevated creatinine at 1.20, mild albuminemia and proteinemia. Troponin was negative.     Full examination revealed tenderness to palpation of right wrist and right hip.  X-Ray imaging showed a possible minimally impacted acute fracture at the level of the distal right radial metaphysis, but no abnormalities at the hip/pelvis.[SB1.4]  CT hip showed a hematoma, but no fracture.[KI1.1]    In terms of head injury, the patient had a direct injury to the head, and despite not on any blood thinners, and having no headache, with vomiting following the fall, CT of the head was obtained, which was negative for acute abnormalities.  In terms of neck injury, the patient was cleared by NEXUS criteria.      Splint was placed on right upper extremity per procedural note above and patient was feeling improved. However, during her stay in the ED patient was unable to ambulate with assistance, and had a new episode of emesis. Given this, her age and medical history, as well as the patient's preference to be admitted to the hospital, I will admit under the care of the hospitalist team.[SB1.4]     Diagnosis:[SB1.1]    ICD-10-CM    1. Hip pain M25.559   2. Fall, initial encounter W19.XXXA   3. Contusion of right hip, initial encounter S70.01XA   4. Right wrist pain M25.531   5. Non-intractable vomiting with nausea, unspecified vomiting type R11.2[SB1.5]     Disposition:[SB1.1]   Observation unit[SB1.4]     Scribe Disclosure:  I, Sobeida Anali, am serving as a scribe at 12:34 AM on 10/11/2017 to document services personally performed by Curtis Lucas MD, based on my observations and the provider's statements to me.     EMERGENCY DEPARTMENT[SB1.1]       Curtis Lucas MD  10/11/17 0708  [KI1.2]     Revision History        User Key Date/Time User Provider Type Action    > KI1.2 10/11/2017  7:08 AM Curtis Lucas MD Physician Sign     KI1.1 10/11/2017  7:07 AM Curtis Lucas MD Physician      SB1.3 10/11/2017  4:52 AM Baccino, Sobeida Scribe Share     SB1.5 10/11/2017  4:37 AM Baccino, Sobeida Scribe      SB1.4 10/11/2017  4:35 AM Baccino, Sobeida Scribe      SB1.2 10/11/2017  3:51 AM Baccino, Sobeida Scribe Share     SB1.1 10/11/2017 12:43 AM Baccino, Sobeida Scribe Share            ED Notes by Arturo Villagran at 10/11/2017  6:26 AM     Author:  Arturo Villagran Service:  (none) Author Type:  Emergency Room Technician    Filed:  10/11/2017  6:27 AM Date of Service:  10/11/2017  6:26 AM Creation Time:  10/11/2017  6:26 AM    Status:  Signed :  Arturo Villagran (Emergency Room Technician)         Pt escorted to floor by EDT x2. Pt belongings accounted for.[JL1.1]     Revision History        User Key Date/Time User Provider Type Action    > JL1.1 10/11/2017  6:27 AM Arturo Villagran Emergency Room Technician Sign            ED Notes by Leonardo Naidu RN at 10/11/2017  3:15 AM     Author:  Leonardo Naidu RN Service:  (none) Author Type:  Registered Nurse    Filed:  10/11/2017  4:31 AM Date of Service:  10/11/2017  3:15 AM Creation Time:  10/11/2017  3:19 AM    Status:  Addendum :  Leonardo Naidu RN (Registered Nurse)          [facility]  ED Nurse Handoff Report    ED Chief complaint: Hip Pain (right hip swelling after fall, did hit head but no LOC, mechanical fall)      ED Diagnosis:   Final diagnoses:   None       Code Status: to be addressed upon admission    Allergies:   Allergies   Allergen Reactions     Cephalexin Rash       Activity level - Baseline/Home:  Independent    Activity Level - Current:   Stand with Assist of 2     Needed?: No    Isolation: No  Infection: Not Applicable    Bariatric?: No    Vital Signs:   Vitals:    10/11/17 0200 10/11/17 0215 10/11/17 0230 10/11/17 0231   BP: 142/64 151/68 154/67    Pulse: 63 66  75   Resp:    18   Temp:       TempSrc:       SpO2: 96% 94%  96%   Weight:       Height:           Cardiac Rhythm: ,        Pain level: 0-10 Pain Scale: 5    Is this patient confused?: Yes    Patient Report: Initial Complaint: Fall with right hip pain  Focused Assessment: Pt had mechanical fall at home in her own apartment where she lives alone. Fell backwards and landed on right hip and did hit head on ground. Denies LOC. Pt did not c/o pain in right wrist but does have fracture noted there and splint was applied. Tried to have pt sit to try to ambulate and pt became diaphoretic and nauseated with dry heaves. Pt c/o pain in right buttock and down right leg with moving. Pt is pleasantly confused, tries to cover for herself and make excuses. Normally walks with walker.[AF1.1] Bruising Rt Buttox[JP1.1]  Tests Performed: Labs, EKG, Xrays  Abnormal Results: pending  Treatments provided: 500cc NS bolus, Morphine 4mg, Zofran 4mg    Family Comments: daughter and son present but will be going home    OBS brochure/video discussed/provided to patient: N/A    ED Medications:   Medications   morphine (PF) injection 4 mg (4 mg Intravenous Given 10/11/17 0258)   ondansetron (ZOFRAN) injection 4 mg (4 mg Intravenous Given 10/11/17 0255)   0.9% sodium chloride BOLUS (500 mLs Intravenous New  Bag 10/11/17 0301)       Drips infusing?:  No      ED NURSE PHONE NUMBER: [phone][AF1.1]          Revision History        User Key Date/Time User Provider Type Action    > JP1.1 10/11/2017  4:31 AM Leonardo Naidu RN Registered Nurse Addend     AF1.1 10/11/2017  3:19 AM Marilee Douglas RN Registered Nurse Sign            ED Notes signed by Adela Non-Provider at 10/11/2017  3:28 AM      Author:  Adela Non-Provider Service:  (none) Author Type:  (none)    Filed:  10/11/2017  3:28 AM Date of Service:  10/11/2017  2:47 AM Creation Time:  10/11/2017  3:28 AM    Status:  Signed :  Adela Non-Provider     Scan on 10/11/2017  3:28 AM by Adela Non-Provider : [facility]          Revision History        User Key Date/Time User Provider Type Action    > [N/A] 10/11/2017  3:28 AM Adela Non-Provider (none) Sign            ED Notes by Marilee Douglas RN at 10/11/2017  2:49 AM     Author:  Marilee Douglas RN Service:  (none) Author Type:  Registered Nurse    Filed:  10/11/2017  2:50 AM Date of Service:  10/11/2017  2:49 AM Creation Time:  10/11/2017  2:50 AM    Status:  Signed :  Marilee Douglas RN (Registered Nurse)         Had pt sit on edge of bed in attempt to have her stand and attempt to ambulate and pt became diaphoretic and nauseated and had dry heaves. Pt c/o a lot of pain in her right hip/buttock. Will notify MD of same. Pt assisted back to bed.[AF1.1]     Revision History        User Key Date/Time User Provider Type Action    > AF1.1 10/11/2017  2:50 AM Marilee Douglas RN Registered Nurse Sign            ED Notes by Marilee Douglas RN at 10/11/2017 12:30 AM     Author:  Marilee Douglas RN Service:  (none) Author Type:  Registered Nurse    Filed:  10/11/2017 12:55 AM Date of Service:  10/11/2017 12:30 AM Creation Time:  10/11/2017 12:55 AM    Status:  Signed :  Marilee Douglas, RN (Registered Nurse)         Pt was trying to back out of a storage room at her  apartment and tripped over metal threshing between the rooms and fell back landing on her right hip and hit her head on the ground. Pt denies any LOC and was able to push her life alert button. Pt was able to ambulate a few steps with EMS help but then became diaphoretic and had an emesis so was brought in for eval. Pt is pleasantly confused, talking about how she just went in today for some testing for mentation apparently.[AF1.1]      Revision History        User Key Date/Time User Provider Type Action    > AF1.1 10/11/2017 12:55 AM Marilee Douglas RN Registered Nurse Sign            ED Notes by Eduardo Santiago RN at 10/11/2017 12:20 AM     Author:  Eduardo Santiago RN Service:  (none) Author Type:  Registered Nurse    Filed:  10/11/2017 12:20 AM Date of Service:  10/11/2017 12:20 AM Creation Time:  10/11/2017 12:20 AM    Status:  Signed :  Eduardo Santiago RN (Registered Nurse)         Bed: ED19  Expected date: 10/11/17  Expected time: 12:20 AM  Means of arrival: Ambulance  Comments:  [facility] 81F fall; hip inj     Revision History        User Key Date/Time User Provider Type Action    > JK1.1 10/11/2017 12:20 AM Eduardo Santiago, RN Registered Nurse Sign                  Procedure Notes     No notes of this type exist for this encounter.         Progress Notes - Therapies (Notes from 10/09/17 through 10/12/17)      Progress Notes by Radha Samuels OT at 10/11/2017  4:10 PM     Author:  Radha Samuels OT Service:  (none) Author Type:  Occupational Therapist    Filed:  10/11/2017  4:10 PM Date of Service:  10/11/2017  4:10 PM Creation Time:  10/11/2017  4:10 PM    Status:  Signed :  Radha Samuels OT (Occupational Therapist)          10/11/17 1538   Quick Adds   Type of Visit Initial Occupational Therapy Evaluation   Living Environment   Lives With alone   Living Arrangements condominium   Home Accessibility tub/shower is not walk in   Number of Stairs to Enter Home 0   Number of Stairs Within  "Home 0   Transportation Available family or friend will provide  (pt does not drive at baseline )   Living Environment Comment Elevator access. Pt's daughter was present for session and reported she is unable to provide assistance at discharge due to her work schedule. Bathroom is equipped with a tub/shower with a shower chair. Standard toilet.    Self-Care   Usual Activity Tolerance good   Current Activity Tolerance fair   Equipment Currently Used at Home shower chair;walker, rolling   Activity/Exercise/Self-Care Comment Pt uses a FWW for mobility. She also has access to a 4WW and wheelchair.    Functional Level Prior   Ambulation 1-->assistive equipment   Transferring 1-->assistive equipment   Toileting 0-->independent   Bathing 0-->independent   Dressing 0-->independent   Eating 0-->independent   Communication 0-->understands/communicates without difficulty   Swallowing 0-->swallows foods/liquids without difficulty   Cognition 0 - no cognition issues reported   Fall history within last six months yes   Number of times patient has fallen within last six months 1   General Information   Onset of Illness/Injury or Date of Surgery - Date 10/11/17   Referring Physician Ketan   Patient/Family Goals Statement \"I would prefer to go home\"   Additional Occupational Profile Info/Pertinent History of Current Problem Pt is a 89 year old female who was admitted on 10/11 after a fall and was found to have a R distal radius impacted fracture and R hip subcutaneous hematoma.    Precautions/Limitations fall precautions   Weight-Bearing Status - RUE weight-bearing as tolerated   Weight-Bearing Status - RLE weight-bearing as tolerated   Pain Assessment   Patient Currently in Pain Yes, see Vital Sign flowsheet  (\"very high\")   Transfer Skill: Bed to Chair/Chair to Bed   Level of [place]: Bed to Chair minimum assist (75% patients effort)   Weight-Bearing Restrictions weight-bearing as tolerated   Assistive Device - Transfer Skill " Bed to Chair Chair to Bed Rehab Eval rolling walker  (FWW)   Transfer Skill: Sit to Stand   Level of [place]: Sit/Stand contact guard   Transfer Skill: Sit to Stand weight-bearing as tolerated   Assistive Device for Transfer: Sit/Stand rolling walker  (FWW)   Transfer Skill: Toilet Transfer   Level of [place]: Toilet minimum assist (75% patients effort)   Assistive Device grab bars;rolling walker  (FWW)   Bathing   Level of [place] maximum assist (25% patients effort)   Upper Body Dressing   Level of [place]: Dress Upper Body moderate assist (50% patients effort)   Lower Body Dressing   Level of [place]: Dress Lower Body maximum assist (25% patients effort)   Toileting   Level of [place]: Toilet moderate assist (50% patients effort)   Grooming   Level of [place]: Grooming minimum assist (75% patients effort)   Eating/Self Feeding   Level of [place]: Eating minimum assist (75% patients effort)   Instrumental Activities of Daily Living (IADL)   Previous Responsibilities meal prep;medication management   Activities of Daily Living Analysis   Impairments Contributing to Impaired Activities of Daily Living balance impaired;pain;strength decreased   General Therapy Interventions   Planned Therapy Interventions ADL retraining;IADL retraining;transfer training;progressive activity/exercise   Clinical Impression   Criteria for Skilled Therapeutic Interventions Met yes, treatment indicated   OT Diagnosis Decreased independence with ADL/IADLs   Influenced by the following impairments Endurance, strength, pain, balance   Assessment of Occupational Performance 5 or more Performance Deficits   Identified Performance Deficits Dressing, bathing, groom/hyg, toileting, transfers, ambulation    Clinical Decision Making (Complexity) Moderate complexity   Therapy Frequency daily   Predicted Duration of Therapy Intervention (days/wks) 3 days   Anticipated Discharge Disposition Transitional Care  "Facility   Risks and Benefits of Treatment have been explained. Yes   Patient, Family & other staff in agreement with plan of care Yes   [facility] TM \"6 Clicks\"   2016, Trustees of [facility], under license to [employer].  All rights reserved.   6 Clicks Short Forms Daily Activity Inpatient Short Form   [facility]  \"6 Clicks\" Daily Activity Inpatient Short Form   1. Putting on and taking off regular lower body clothing? 2 - A Lot   2. Bathing (including washing, rinsing, drying)? 2 - A Lot   3. Toileting, which includes using toilet, bedpan or urinal? 2 - A Lot   4. Putting on and taking off regular upper body clothing? 2 - A Lot   5. Taking care of personal grooming such as brushing teeth? 3 - A Little   6. Eating meals? 3 - A Little   Daily Activity Raw Score (Score out of 24.Lower scores equate to lower levels of function) 14   Total Evaluation Time   Total Evaluation Time (Minutes) 10[JH1.1]        Revision History        User Key Date/Time User Provider Type Action    > JH1.1 10/11/2017  4:10 PM Radha Samuels OT Occupational Therapist Sign            Progress Notes by Vonda Haley PT at 10/11/2017  2:11 PM     Author:  Vonda Haley PT Service:  (none) Author Type:  Physical Therapist    Filed:  10/11/2017  2:11 PM Date of Service:  10/11/2017  2:11 PM Creation Time:  10/11/2017  2:11 PM    Status:  Signed :  Vonda Haley PT (Physical Therapist)          10/11/17 1100   Quick Adds   Type of Visit Initial PT Evaluation   Living Environment   Lives With alone   Living Arrangements condominium   Home Accessibility tub/shower is not walk in   Number of Stairs to Enter Home 0   Number of Stairs Within Home 0   Transportation Available family or friend will provide;agency transportation  (pt does not drive; uses family vs condo transport)   Self-Care   Usual Activity Tolerance good   Current Activity Tolerance fair   Regular Exercise no   Equipment " "Currently Used at Home walker, rolling;shower chair;grab bar   Functional Level Prior   Ambulation 0-->independent   Transferring 0-->independent   Toileting 0-->independent   Bathing 0-->independent   Dressing 0-->independent   Eating 0-->independent   Communication 0-->understands/communicates without difficulty   Swallowing 0-->swallows foods/liquids without difficulty   Cognition 0 - no cognition issues reported   Fall history within last six months yes   Number of times patient has fallen within last six months 1   Which of the above functional risks had a recent onset or change? ambulation;transferring;toileting;bathing;dressing   Prior Functional Level Comment Pt lives alone in a senior apartment, elevator access. Pt reports she is IND for functional mobility without AD at baseline, later states she \"sometimes\" uses her FWW. Pt's family assists with cleaning, medications, transportation; pt uses facility transport to do her own grocery shopping.   General Information   Onset of Illness/Injury or Date of Surgery - Date 10/11/17   Referring Physician Dino Aceves MD   Patient/Family Goals Statement To go home   Pertinent History of Current Problem (include personal factors and/or comorbidities that impact the POC) Pt is an 89yo female admitted after fall, found to have R distal radius impacted fracture and R hip subcutaneous hematoma. Per ortho note, pt can be WBAT on RUE in splint and ambulate as tolerated with assistance.    Precautions/Limitations fall precautions   Weight-Bearing Status - RUE weight-bearing as tolerated   Weight-Bearing Status - RLE weight-bearing as tolerated   General Observations Pt resting in bed, RUE in splint/cast, family at bedsdie. Pt with IV   General Info Comments Activity: per ortho note, pt can be WBAT on RUE with splint, can ambulate as tolerated with assistance   Cognitive Status Examination   Orientation orientation to person, place and time   Level of Consciousness " alert   Follows Commands and Answers Questions 100% of the time;able to follow multistep instructions   Personal Safety and Judgment intact   Integumentary/Edema   Integumentary/Edema Comments hematoma to R hip   Posture    Posture Forward head position;Protracted shoulders   Range of Motion (ROM)   ROM Comment BLE WNL with AAROM, limited by pain in R hip   Strength   Strength Comments RLE limited by pain, LLE grossly 4/5   Bed Mobility   Bed Mobility Comments Trice supine>sit with HOB elevated and with rail   Transfer Skills   Transfer Comments Trice sit>stand to FWW   Gait   Gait Comments unable to assess, pt becoming diaphoretic and pale when standing from commode prior to initiating ambulation   Balance   Balance Comments Fairly good sitting balance, decreased standing balance   Sensory Examination   Sensory Perception Comments Denies N/T   Coordination   Coordination no deficits were identified   Muscle Tone   Muscle Tone no deficits were identified   General Therapy Interventions   Planned Therapy Interventions balance training;bed mobility training;gait training;neuromuscular re-education;ROM;strengthening;stretching;transfer training;progressive activity/exercise;home program guidelines   Clinical Impression   Criteria for Skilled Therapeutic Intervention yes, treatment indicated   PT Diagnosis Impaired gait   Influenced by the following impairments Pain, weakness, decreased ROM, fatigue, decreased activity tolerance, syncopal and near syncopal episodes during hospitalization   Functional limitations due to impairments Decreased safety and independence with functional transfers, gait   Clinical Presentation Evolving/Changing   Clinical Presentation Rationale syncopal/ near syncopal episodes   Clinical Decision Making (Complexity) Low complexity   Therapy Frequency` daily   Predicted Duration of Therapy Intervention (days/wks) 4 days   Anticipated Discharge Disposition Transitional Care Facility;Home with Home  "Therapy;Home with Assist   Risk & Benefits of therapy have been explained Yes   Patient, Family & other staff in agreement with plan of care Yes   [facility] TM \"6 Clicks\"   2016, Trustees of [facility], under license to [employer].  All rights reserved.   6 Clicks Short Forms Basic Mobility Inpatient Short Form   [facility] AM-PAC  \"6 Clicks\" V.2 Basic Mobility Inpatient Short Form   1. Turning from your back to your side while in a flat bed without using bedrails? 3 - A Little   2. Moving from lying on your back to sitting on the side of a flat bed without using bedrails? 3 - A Little   3. Moving to and from a bed to a chair (including a wheelchair)? 3 - A Little   4. Standing up from a chair using your arms (e.g., wheelchair, or bedside chair)? 3 - A Little   5. To walk in hospital room? 3 - A Little   6. Climbing 3-5 steps with a railing? 2 - A Lot   Basic Mobility Raw Score (Score out of 24.Lower scores equate to lower levels of function) 17   Total Evaluation Time   Total Evaluation Time (Minutes) 10[KJ1.1]        Revision History        User Key Date/Time User Provider Type Action    > KJ1.1 10/11/2017  2:11 PM Vonda Haley, PT Physical Therapist Sign            "

## 2017-10-11 NOTE — CONSULTS
[facility]    Orthopedic Surgery Consultation    Date of Admission:  10/11/2017  Date of Consult (When I saw the patient): 10/11/17    Assessment & Plan     Assessment:  Right distal radius impacted fracture nondisplaced  Right subcutaneous Hematoma    Plan:  I would treat the hematoma with ice at this point, some compression.  It feels too firm to consider aspiration.  I think it should be able to be managed nonoperatively at this point.  No evidence of sciatic nerve contusion    Plan to change the splint in two or three days on her right forearm probably can get by with removable wrist brace.  I'll follow-up tomorrow  She can be weightbearing as tolerated and ambulate as tolerated.  Up with assistance.    Dino Aceves MD    Code Status    Full Code     Reason for Consult   Reason for consult: Pain in right hip after a fall and right wrist pain    Primary Care Physician   Kylee Liang    Chief Complaint   Right hip pain and right arm pain        History of Present Illness   History is obtained from the patient and the chart    Eri Puckett is a 89 year old female who presents with complaints of right hip pain and right arm pain after a fall.  She states that she was at home.  She was getting ready to have some workers in her house.  She was organizing her closet.  She stepped backward and lost her balance.  She and her head right wrist and landed on her right hip.  She pushed her [employer] button and paramedics came to the house.  She was brought to [facility] emergency room.  She was evaluated.  Had a CT scan of her head, CT scan of her hip and pelvis and x-rays of her right wrist.    CT scan of her head was negative, right wrist x-rays show a possible impacted distal radius fracture.  CT scan of her right hip showed a hematoma.  She was placed into a splint on her right wrist and admitted for evaluation of her hip.    Her past medical history is significant  for lymphoma, type 2 diabetes, hypertension, glaucoma, peripheral neuropathy, degenerative joint disease.  She lives alone, is  and a senior apartment building.  She normally ambulates independently but uses a walker at times.    She's complaining of some right hip pain but she says is much better today.  She is in a splint the right arm.  No significant pain.  Denies any fevers, chills, sweats, loss of consciousness, chest pain, nausea, numbness or tingling in her leg.      Past Medical History   I have reviewed this patient's medical history and updated it with pertinent information if needed.   Past Medical History:   Diagnosis Date     Asthma     mild  2013     Cancer (H)     hodkins lymphoma 2009 with recurent now     Diabetes mellitus     hx type 2 40 years       Past Surgical History   I have reviewed this patient's surgical history and updated it with pertinent information if needed.  History reviewed. No pertinent surgical history.    Prior to Admission Medications   Prior to Admission Medications   Prescriptions Last Dose Informant Patient Reported? Taking?   ALLOPURINOL PO 10/10/2017 at am Daughter Yes Yes   Sig: Take 100 mg by mouth daily   ASPIRIN EC PO 10/9/2017 at pm Daughter Yes Yes   Sig: Take 81 mg by mouth daily   Carboxymethylcellul-Glycerin (REFRESH OPTIVE) 1-0.9 % GEL 10/10/2017 at am Daughter Yes Yes   Sig: Apply to eye 2 times daily Gel to left eye   Cholecalciferol (VITAMIN D3 PO) 10/9/2017 at pm Daughter Yes No   Sig: Take 2,000 Units by mouth daily   Cyanocobalamin (VITAMIN B 12 PO) 10/9/2017 at noon Daughter Yes Yes   Sig: Take 1,000 mcg by mouth daily   FLUOXETINE HCL PO 10/10/2017 at am Daughter Yes Yes   Sig: Take 20 mg by mouth 2 times daily   LISINOPRIL PO 10/9/2017 at noon  Yes Yes   Sig: Take 15 mg by mouth daily   Linagliptin (TRADJENTA PO) 10/9/2017 at pm Daughter Yes Yes   Sig: Take 2.5 mg by mouth daily   Multiple Vitamins-Minerals (CENTRUM SILVER) per tablet 10/9/2017  at noon Daughter Yes Yes   Sig: Take 1 tablet by mouth daily   OMEPRAZOLE PO 10/9/2017 at noon Daughter Yes Yes   Sig: Take 20 mg by mouth every morning   Polyethylene Glycol 400 (BLINK TEARS OP)  at prn Daughter Yes Yes   Sig: Apply 1 drop to eye as needed (to both eye as needed)   Rosuvastatin Calcium (CRESTOR PO) 10/9/2017 at pm Daughter Yes Yes   Sig: Take 20 mg by mouth daily   brimonidine (ALPHAGAN-P) 0.15 % ophthalmic solution 10/10/2017 at am Daughter Yes Yes   Sig: Place 1 drop Into the left eye 2 times daily   calcium-vitamin D (CALTRATE) 600-400 MG-UNIT per tablet 10/10/2017 at am Daughter Yes Yes   Sig: Take 1 tablet by mouth 2 times daily      Facility-Administered Medications: None     Allergies   Allergies   Allergen Reactions     Cephalexin Rash       Social History   I have reviewed this patient's social history and updated it with pertinent information if needed. Eri Puckett  reports that she has never smoked. She has never used smokeless tobacco. She reports that she does not drink alcohol or use illicit drugs.   She has been  for 15 years, was  for 48 years, has family that helps her with medications and pain pills.    Family History   I have reviewed this patient's family history and updated it with pertinent information if needed.   No family history on file.    Review of Systems   The 10 point Review of Systems is negative other than noted in the HPI or here.     Physical Exam   Temp: 97.8  F (36.6  C) Temp src: Oral BP: 133/61 Pulse: 94   Resp: 18 SpO2: 99 % O2 Device: Nasal cannula Oxygen Delivery: 3 LPM  Vital Signs with Ranges  Temp:  [97.8  F (36.6  C)-98.3  F (36.8  C)] 97.8  F (36.6  C)  Pulse:  [59-94] 94  Resp:  [18] 18  BP: (120-154)/(61-83) 133/61  SpO2:  [94 %-100 %] 99 %  160 lbs 0 oz    Constitutional: Alert , Co-opertive, she is laying in bed.  Comfortable.  Skin: She has a relatively large ecchymosis over her right posterior buttock and gluteal area.  Mild  "tenderness.  There is palpable firmness.  Obvious hematoma measuring probably 6\" x 4\".  It is firm and feels organized  Motor: She is able do active straight leg raising test.  Strength is good.  Sensation: She has normal sensation in the sciatic nerve distribution  Circulation: No significant edema in her leg  I was able to put her hip through range of motion without pain.  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  No pain with internal or external rotation.    Examination of her right forearm shows she is in a splint with an Ace bandage.  CMS is intact.  No pain with range of motion of the finger.  Splint appears to be fitting well.    Data   Results for orders placed or performed during the hospital encounter of 10/11/17 (from the past 24 hour(s))   CBC with platelets + differential   Result Value Ref Range    WBC 4.9 4.0 - 11.0 10e9/L    RBC Count 4.37 3.8 - 5.2 10e12/L    Hemoglobin 12.6 11.7 - 15.7 g/dL    Hematocrit 38.2 35.0 - 47.0 %    MCV 87 78 - 100 fl    MCH 28.8 26.5 - 33.0 pg    MCHC 33.0 31.5 - 36.5 g/dL    RDW 13.5 10.0 - 15.0 %    Platelet Count 135 (L) 150 - 450 10e9/L    Diff Method Automated Method     % Neutrophils 63.6 %    % Lymphocytes 18.4 %    % Monocytes 12.9 %    % Eosinophils 4.7 %    % Basophils 0.2 %    % Immature Granulocytes 0.2 %    Nucleated RBCs 0 0 /100    Absolute Neutrophil 3.1 1.6 - 8.3 10e9/L    Absolute Lymphocytes 0.9 0.8 - 5.3 10e9/L    Absolute Monocytes 0.6 0.0 - 1.3 10e9/L    Absolute Eosinophils 0.2 0.0 - 0.7 10e9/L    Absolute Basophils 0.0 0.0 - 0.2 10e9/L    Abs Immature Granulocytes 0.0 0 - 0.4 10e9/L    Absolute Nucleated RBC 0.0    Comprehensive metabolic panel   Result Value Ref Range    Sodium 141 133 - 144 mmol/L    Potassium 3.8 3.4 - 5.3 mmol/L    Chloride 104 94 - 109 mmol/L    Carbon Dioxide 29 20 - 32 mmol/L    Anion Gap 8 3 - 14 mmol/L    Glucose 148 (H) 70 - 99 mg/dL    Urea Nitrogen 21 7 - 30 mg/dL    Creatinine 1.20 (H) 0.52 - 1.04 mg/dL    " GFR Estimate 42 (L) >60 mL/min/1.7m2    GFR Estimate If Black 51 (L) >60 mL/min/1.7m2    Calcium 7.9 (L) 8.5 - 10.1 mg/dL    Bilirubin Total 0.4 0.2 - 1.3 mg/dL    Albumin 3.1 (L) 3.4 - 5.0 g/dL    Protein Total 6.0 (L) 6.8 - 8.8 g/dL    Alkaline Phosphatase 49 40 - 150 U/L    ALT 21 0 - 50 U/L    AST 22 0 - 45 U/L   Lipase   Result Value Ref Range    Lipase 131 73 - 393 U/L   Troponin I (now)   Result Value Ref Range    Troponin I ES <0.015 0.000 - 0.045 ug/L   XR Pelvis and Hip Right 1 View    Narrative    PELVIS AND RIGHT HIP 2 VIEWS  10/11/2017 1:03 AM     HISTORY: Pain.    COMPARISON: None.      Impression    IMPRESSION: No visualized acute fracture, malalignment or other acute  osseous abnormality of the visualized portions of the pelvis or right  hip.     YOANDY AJ MD   Wrist XR, G/E 3 views, right    Narrative    RIGHT WRIST 3 VIEWS  10/11/2017 1:04 AM     HISTORY: Fall. Pain.    COMPARISON: None.      Impression    IMPRESSION:  1. A subtle linear transverse band of relatively increased density  within the distal right radial metaphysis. This is not convincing for  an acute fracture, but a minimally impacted acute fracture cannot be  entirely excluded.  2. Normal alignment of the right wrist.  3. Diffuse osteopenia.  4. Severe degenerative changes in the right first carpometacarpal  joint.    YOANDY AJ MD   Head CT w/o contrast    Narrative    CT HEAD WITHOUT CONTRAST  10/11/2017 1:19 AM     HISTORY: Fall. Vomiting.    COMPARISON: 12/6/2007.    TECHNIQUE: Without intravenous contrast, helical sections were  acquired through the brain. Coronal reconstructions were generated.  Radiation dose for this scan was reduced using automated exposure  control, adjustment of the mA and/or kV according to the patient's  size, or iterative reconstruction technique.    FINDINGS: Mild diffuse cerebral atrophy. Mild bilateral  periventricular white matter low attenuation, likely relating to  chronic small vessel  ischemic disease. No intra-axial mass, mass  effect or midline shift. Normal gray-white matter differentiation. No  visualized acute intracranial hemorrhage. The cerebral ventricles are  normal in caliber. The basal cisterns are patent. No extra-axial fluid  collection. The visualized portions of the paranasal sinuses and  mastoid air cells are unremarkable.      Impression    IMPRESSION: No evidence of acute intracranial abnormality.    YOANDY AJ MD   EKG 12 lead   Result Value Ref Range    Interpretation ECG Click View Image link to view waveform and result    CT Hip Right w/o Contrast    Narrative    CT PELVIC BONE WITHOUT CONTRAST  10/11/2017 5:33 AM     HISTORY: Fall. Pain. Evaluate for occult fracture. Large hematoma on  exam.    COMPARISON: None.    TECHNIQUE: Without intravenous or oral contrast, helical sections were  acquired from the iliac crests through the pubic symphysis. Coronal  and sagittal reconstructions were generated. Radiation dose for this  scan was reduced using automated exposure control, adjustment of the  mA and/or kV according to the patient's size, or iterative  reconstruction technique.    FINDINGS: No visualized acute fractures or malalignment of the pelvic  bone or right hip. An irregular-shaped high attenuation mass-like  structure is present in the deep subcutaneous tissues of the lateral  aspect of the right hip measuring 16 cm in anteroposterior dimension,  6 cm in transverse dimension, and 15 cm in craniocaudal dimension  (series 3 image 84). There are hazy opacities in the surrounding fat.  This likely represents a hematoma.    The visualized portions of the small and large bowel are normal in  caliber. A few colonic diverticula are present without evidence of  diverticulitis. The uterus is not visualized. No enlarged lymph nodes  or free fluid in the pelvis. Very small bilateral inguinal hernias  containing fat.      Impression    IMPRESSION:  1. No visualized acute  fracture or malalignment of the pelvis or right  hip.  2. Large hematoma in the subcutaneous tissues at the lateral aspect of  the right hip.    YOANDY JA MD   EKG 12-lead, tracing only   Result Value Ref Range    Interpretation ECG Click View Image link to view waveform and result        Imaging:  I reviewed her wrist x-rays.  There is a nondisplaced slightly impacted fracture of the distal radius.    CT scan of her hips did not reveal any fracture.  She does have a hematoma.  It is a subcutaneous.  Measures 16 cm x 15 cm x 6 cm.  No significant arthritis.

## 2017-10-11 NOTE — PROVIDER NOTIFICATION
Paged House doctor regarding pt fall. Staff nurse and nurse assistant  Each holding an arm to walk pt to from ED cart to 402-2. Pt stood up and state  She was nauseas and then fainted. She was gently  lower to the floor by the nurse and nurse assistant and she sat down on her bottom and she didn't  hit her head. Per patient and ED staff, pt was reported that she was able to walk safety to the bed. Pt was gently lift to ED cart and transfer to the hospital bed. House Doc notify.

## 2017-10-11 NOTE — IP AVS SNAPSHOT
` [place] [address] [facility]: [phone]            Medication Administration Report for Eri Puckett as of 10/14/17 1628   Legend:    Given Hold Not Given Due Canceled Entry Other Actions    Time Time (Time) Time  Time-Action       Inactive    Active    Linked        Medications 10/08/17 10/09/17 10/10/17 10/11/17 10/12/17 10/13/17 10/14/17    acetaminophen (TYLENOL) Suppository 650 mg  Dose: 650 mg Freq: EVERY 4 HOURS PRN Route: RE  PRN Reason: mild pain  Start: 10/11/17 0654   Admin Instructions: Alternate ibuprofen (if ordered) with acetaminophen.  Maximum acetaminophen dose from all sources = 75 mg/kg/day not to exceed 4 grams/day.               acetaminophen (TYLENOL) tablet 650 mg  Dose: 650 mg Freq: EVERY 4 HOURS PRN Route: PO  PRN Reason: mild pain  Start: 10/11/17 0654   Admin Instructions: Alternate ibuprofen (if ordered) with acetaminophen.  Maximum acetaminophen dose from all sources = 75 mg/kg/day not to exceed 4 grams/day.        1610 (650 mg)-Given         1225 (650 mg)-Given            albuterol neb solution 2.5 mg  Dose: 2.5 mg Freq: EVERY 2 HOURS PRN Route: NEBULIZATION  PRN Reasons: wheezing,shortness of breath / dyspnea  Start: 10/11/17 0654              allopurinol (ZYLOPRIM) tablet 100 mg  Dose: 100 mg Freq: DAILY Route: PO  Start: 10/11/17 1345       1610 (100 mg)-Given        0942 (100 mg)-Given        0905 (100 mg)-Given        0816 (100 mg)-Given           bisacodyl (DULCOLAX) Suppository 10 mg  Dose: 10 mg Freq: DAILY PRN Route: RE  PRN Reason: constipation  Start: 10/11/17 0654   Admin Instructions: Hold for loose stools.  This is the third step of a three step constipation treatment protocol.               brimonidine (ALPHAGAN) 0.2 % ophthalmic solution 1 drop  Dose: 1 drop Freq: 2 TIMES DAILY Route: LEFT EYE  Start: 10/11/17 2100   Admin Instructions: Formulary alternate for Home strength of 0.15%        (2128)-Not Given        0941 (1 drop)-Given        (2232)-Not Given        0905 (1 drop)-Given       2134 (1 drop)-Given        0817 (1 drop)-Given       [ ] 2100           calcium-vitamin D (CALTRATE) 600-400 MG-UNIT per tablet 1 tablet  Dose: 1 tablet Freq: 2 TIMES DAILY WITH MEALS Route: PO  Start: 10/11/17 1800       (1840)-Not Given        0941 (1 tablet)-Given       1715 (1 tablet)-Given        0905 (1 tablet)-Given       1737 (1 tablet)-Given        0815 (1 tablet)-Given       [ ] 1800           carboxymethylcellulose (REFRESH PLUS) 0.5 % ophthalmic solution 1 drop  Dose: 1 drop Freq: 2 TIMES DAILY Route: LEFT EYE  Start: 10/11/17 2100   Admin Instructions: Formulary alternate for Home formulation Gel        (2128)-Not Given        0941 (1 drop)-Given       2059 (1 drop)-Given        0905 (1 drop)-Given       2135 (1 drop)-Given        0816 (1 drop)-Given       [ ] 2100           cholecalciferol (vitamin D) tablet 2,000 Units  Dose: 2,000 Units Freq: EVERY EVENING Route: PO  Start: 10/11/17 2000       (2129)-Not Given        1913 (2,000 Units)-Given        1952 (2,000 Units)-Given        [ ] 2000           cyanocobalamin (vitamin  B-12) tablet 1,000 mcg  Dose: 1,000 mcg Freq: DAILY WITH LUNCH Route: PO  Start: 10/11/17 1430       1610 (1,000 mcg)-Given        1427 (1,000 mcg)-Given        1226 (1,000 mcg)-Given        1239 (1,000 mcg)-Given           FLUoxetine (PROzac) capsule 20 mg  Dose: 20 mg Freq: 2 TIMES DAILY Route: PO  Start: 10/11/17 2100       (2129)-Not Given        0941 (20 mg)-Given       2059 (20 mg)-Given        0905 (20 mg)-Given       2135 (20 mg)-Given        0816 (20 mg)-Given       [ ] 2100           glucose 40 % gel 15-30 g  Dose: 15-30 g Freq: EVERY 15 MIN PRN Route: PO  PRN Reason: low blood sugar  Start: 10/12/17 1724   Admin Instructions: Give 15 g for BG 51 to 69 mg/dL IF patient is conscious and able to swallow. Give 30 g for BG less than or equal to 50 mg/dL IF patient is conscious and able to swallow. Do NOT give glucose gel via  enteral tube.  IF patient has enteral tube: give apple juice 120 mL (4 oz or 15 g of CHO) via enteral tube for BG 51 to 69 mg/dL.  Give apple juice 240 mL (8 oz or 30 g of CHO) via enteral tube for BG less than or equal to 50 mg/dL.    ~Oral gel is preferable for conscious and able to swallow patient.   ~IF gel unavailable or patient refuses may provide apple juice 120 mL (4 oz or 15 g of CHO). Document juice on I and O flowsheet.              Or  dextrose 50 % injection 25-50 mL  Dose: 25-50 mL Freq: EVERY 15 MIN PRN Route: IV  PRN Reason: low blood sugar  Start: 10/12/17 1724   Admin Instructions: Use if have IV access, BG less than 70 mg/dL and meet dose criteria below:  Dose if conscious and alert (or disorientated) and NPO = 25 mL  Dose if unconscious / not alert = 50 mL  Vesicant.              Or  glucagon injection 1 mg  Dose: 1 mg Freq: EVERY 15 MIN PRN Route: SC  PRN Reason: low blood sugar  PRN Comment: May repeat x 1 only  Start: 10/12/17 1724   Admin Instructions: May give SQ or IM. ONLY use glucagon IF patient has NO IV access AND is UNABLE to swallow AND blood glucose is LESS than or EQUAL to 50 mg/dL.               hydrALAZINE (APRESOLINE) injection 10 mg  Dose: 10 mg Freq: EVERY 4 HOURS PRN Route: IV  PRN Reason: high blood pressure  PRN Comment: give for SBP > 180  Start: 10/13/17 1253              HYDROmorphone (PF) (DILAUDID) injection 0.2 mg  Dose: 0.2 mg Freq: EVERY 2 HOURS PRN Route: IV  PRN Reason: severe pain  Start: 10/11/17 0654   Admin Instructions: Hold while on PCA.               insulin aspart (NovoLOG) inj (RAPID ACTING)  Dose: 1-5 Units Freq: AT BEDTIME Route: SC  Start: 10/12/17 2200   Admin Instructions: MEDIUM INSULIN RESISTANCE DOSING    Do Not give Bedtime Correction Insulin if BG less than  200.   For  - 249 give 1 units.   For  - 299 give 2 units.   For  - 349 give 3 units.   For  -399 give 4 units.   For BG greater than or equal to 400 give 5  units.  Notify provider if glucose greater than or equal to 350 mg/dL after administration of correction dose.  If given at mealtime, must be administered 5 min before meal or immediately after.         (2231)-Not Given [C]        (2302)-Not Given [C]        [ ] 2200           insulin aspart (NovoLOG) inj (RAPID ACTING)  Dose: 1-7 Units Freq: 3 TIMES DAILY BEFORE MEALS Route: SC  Start: 10/12/17 1730   Admin Instructions: Correction Scale - MEDIUM INSULIN RESISTANCE DOSING     Do Not give Correction Insulin if Pre-Meal BG less than 140.   For Pre-Meal  - 189 give 1 unit.   For Pre-Meal  - 239 give 2 units.   For Pre-Meal  - 289 give 3 units.   For Pre-Meal  - 339 give 4 units.   For Pre-Meal - 399 give 5 units.   For Pre-Meal -449 give 6 units  For Pre-Meal BG greater than or equal to 450 give 7 units.   To be given with prandial insulin, and based on pre-meal blood glucose.    Notify provider if glucose greater than or equal to 350 mg/dL after administration of correction dose.  If given at mealtime, must be administered 5 min before meal or immediately after.         (1827)-Not Given        (0901)-Not Given       (1328)-Not Given       1820 (1 Units)-Given        (0817)-Not Given [C]       (1205)-Not Given [C]       1544 (1 Units)-Given [C]       [ ] 1700           linagliptin (TRADJENTA) tablet 2.5 mg  Dose: 2.5 mg Freq: DAILY Route: PO  Start: 10/12/17 1730        1912 (2.5 mg)-Given        1226 (2.5 mg)-Given        0816 (2.5 mg)-Given           multivitamin, therapeutic with minerals (THERA-VIT-M) tablet 1 tablet  Dose: 1 tablet Freq: DAILY WITH LUNCH Route: PO  Start: 10/12/17 1200        1413 (1 tablet)-Given        1226 (1 tablet)-Given        1239 (1 tablet)-Given           naloxone (NARCAN) injection 0.1-0.4 mg  Dose: 0.1-0.4 mg Freq: EVERY 2 MIN PRN Route: IV  PRN Reason: opioid reversal  Start: 10/11/17 0654   Admin Instructions: For respiratory rate LESS than or  EQUAL to 8.  Partial reversal dose:  0.1 mg titrated q 2 minutes for Analgesia Side Effects Monitoring Sedation Level of 3 (frequently drowsy, arousable, drifts to sleep during conversation).Full reversal dose:  0.4 mg bolus for Analgesia Side Effects Monitoring Sedation Level of 4 (somnolent, minimal or no response to stimulation).               omeprazole (priLOSEC) CR capsule 20 mg  Dose: 20 mg Freq: DAILY WITH LUNCH Route: PO  Start: 10/12/17 1200        1413 (20 mg)-Given        1226 (20 mg)-Given        1239 (20 mg)-Given           ondansetron (ZOFRAN-ODT) ODT tab 4 mg  Dose: 4 mg Freq: EVERY 6 HOURS PRN Route: PO  PRN Reasons: nausea,vomiting  Start: 10/11/17 0654   Admin Instructions: This is Step 1 of nausea and vomiting management.  If nausea not resolved in 15 minutes, go to Step 2 prochlorperazine (COMPAZINE). Do not push through foil backing. Peel back foil and gently remove. Place on tongue immediately. Administration with liquid unnecessary        1637 (4 mg)-Given                    Or  ondansetron (ZOFRAN) injection 4 mg  Dose: 4 mg Freq: EVERY 6 HOURS PRN Route: IV  PRN Reasons: nausea,vomiting  Start: 10/11/17 0654   Admin Instructions: This is Step 1 of nausea and vomiting management.  If nausea not resolved in 15 minutes, go to Step 2 prochlorperazine (COMPAZINE).  Irritant.               2228 (4 mg)-Given              polyethylene glycol (MIRALAX/GLYCOLAX) Packet 17 g  Dose: 17 g Freq: DAILY PRN Route: PO  PRN Reason: constipation  Start: 10/11/17 0654   Admin Instructions: Give in 8oz of  water, juice, or soda. Hold for loose stools.  This is the second step of a three step constipation treatment protocol.  1 Packet = 17 grams. Mixed prescribed dose in 8 ounces of water. Follow with 8 oz. of water.               potassium chloride (KLOR-CON) Packet 20-40 mEq  Dose: 20-40 mEq Freq: EVERY 2 HOURS PRN Route: ORAL OR FEED  PRN Reason: potassium supplementation  Start: 10/11/17 0654   Admin  Instructions: Use if unable to tolerate tablets.  If Serum K+ 3.0-3.3, dose = 60 mEq po total dose (40 mEq x1 followed in 2 hours by 20 mEq x1). Recheck K+ level 4 hours after dose and the next AM.  If Serum K+ 2.5-2.9, dose = 80 mEq po total dose (40 mEq Q2H x2). Recheck K+ level 4 hours after dose and the next AM.  If Serum K+ less than 2.5, See IV order.  Dissolve packet contents in 4-8 ounces of cold water or juice.               potassium chloride 10 mEq in 100 mL intermittent infusion  Dose: 10 mEq Freq: EVERY 1 HOUR PRN Route: IV  PRN Reason: potassium supplementation  Start: 10/11/17 0654   Admin Instructions: Infuse via PERIPHERAL LINE or CENTRAL LINE. Use for central line replacement if patient weight less than 65 kg, if patient is on TPN with high potassium content or if unit does not stock 20 mEq bags.   If Serum K+ 3.0-3.3, dose = 10 mEq/hr x4 doses (40 mEq IV total dose). Recheck K+ level 2 hours after dose and the next AM.   If Serum K+ less than 3.0, dose = 10 mEq/hr x6 doses (60 mEq IV total dose). Recheck K+ level 2 hours after dose and the next AM.               potassium chloride 10 mEq in 100 mL intermittent infusion with 10 mg lidocaine  Dose: 10 mEq Freq: EVERY 1 HOUR PRN Route: IV  PRN Reason: potassium supplementation  Start: 10/11/17 0654   Admin Instructions: Infuse via PERIPHERAL LINE. Use potassium with lidocaine for pain with peripheral administration.  If Serum K+ 3.0-3.3, dose = 10 mEq/hr x4 doses (40 mEq IV total dose). Recheck K+ level 2 hours after dose and the next AM.  If Serum K+ less than 3.0, dose = 10 mEq/hr x6 doses (60 mEq IV total dose). Recheck K+ level 2 hours after dose and the next AM.               potassium chloride 20 mEq in 50 mL intermittent infusion  Dose: 20 mEq Freq: EVERY 1 HOUR PRN Route: IV  PRN Reason: potassium supplementation  Start: 10/11/17 0654   Admin Instructions: Infuse via CENTRAL LINE Only. May need EKG if less than 65 kg or on TPN - Max rate is  0.3 mEq/kg/hr for patients not on EKG monitoring.   If Serum K+ 3.0-3.3, dose = 20 mEq/hr x2 doses (40 mEq IV total dose). Recheck K+ level 2 hours after dose and the next AM.  If Serum K+ less than 3.0, dose = 20 mEq/hr x3 doses (60 mEq IV total dose). Recheck K+ level 2 hours after dose and the next AM.               potassium chloride SA (K-DUR/KLOR-CON M) CR tablet 20-40 mEq  Dose: 20-40 mEq Freq: EVERY 2 HOURS PRN Route: PO  PRN Reason: potassium supplementation  Start: 10/11/17 0654   Admin Instructions: Use if able to take PO.   If Serum K+ 3.0-3.3, dose = 60 mEq po total dose (40 mEq x1 followed in 2 hours by 20 mEq x1). Recheck K+ level 4 hours after dose and the next AM.  If Serum K+ 2.5-2.9, dose = 80 mEq po total dose (40 mEq Q2H x2). Recheck K+ level 4 hours after dose and the next AM.  If Serum K+ less than 2.5, See IV order.  DO NOT CRUSH               prochlorperazine (COMPAZINE) injection 5 mg  Dose: 5 mg Freq: EVERY 6 HOURS PRN Route: IV  PRN Reasons: nausea,vomiting  Start: 10/11/17 2216       2243 (5 mg)-Given             Or  prochlorperazine (COMPAZINE) tablet 5 mg  Dose: 5 mg Freq: EVERY 6 HOURS PRN Route: PO  PRN Reasons: nausea,vomiting  Start: 10/11/17 2216                    Or  prochlorperazine (COMPAZINE) Suppository 12.5 mg  Dose: 12.5 mg Freq: EVERY 12 HOURS PRN Route: RE  PRN Reasons: nausea,vomiting  Start: 10/11/17 2216                     rosuvastatin (CRESTOR) tablet 20 mg  Dose: 20 mg Freq: EVERY EVENING Route: PO  Start: 10/11/17 2000       (2141)-Not Given        1913 (20 mg)-Given        2000 (20 mg)-Given        [ ] 2000          Completed Medications  Medications 10/08/17 10/09/17 10/10/17 10/11/17 10/12/17 10/13/17 10/14/17         Dose: 0.5 mL Freq: PRIOR TO DISCHARGE Route: IM  Start: 10/12/17 1000   End: 10/12/17 1416   Admin Instructions: Administer when afebrile (less than 100.4F) x 24 hours, or Prior to Discharge.  If not administering when scheduled , change the due  time by following the instructions in the reference link below.  If patient refuses vaccine, chart as Vaccine Refused.         1416 (0.5 mL)-Given               Dose: 2 mL Freq: ONCE Route: IV  Start: 10/12/17 1230   End: 10/12/17 1230   Admin Instructions: 5mL solution of Lumason/saline         1230 (5 mL)-Given            Discontinued Medications  Medications 10/08/17 10/09/17 10/10/17 10/11/17 10/12/17 10/13/17 10/14/17         Rate: 75 mL/hr Freq: CONTINUOUS Route: IV  Last Dose: Stopped (10/13/17 2138)  Start: 10/11/17 0700   End: 10/13/17 2012       0858 (100 mL/hr)-New Bag        0431 ( )-New Bag       1427 ( )-New Bag        0002 ( )-New Bag       0946 ( )-New Bag       1329 ( )-Rate/Dose Change       2012-Med Discontinued  2138-Stopped              Dose: 81 mg Freq: EVERY EVENING Route: PO  Start: 10/11/17 2000   End: 10/13/17 1211       (2128)-Not Given        1913 (81 mg)-Given        1211-Med Discontinued          Dose: 15-30 g Freq: EVERY 15 MIN PRN Route: PO  PRN Reason: low blood sugar  Start: 10/11/17 1330   End: 10/12/17 1727   Admin Instructions: Give 15 g for BG 51 to 69 mg/dL IF patient is conscious and able to swallow. Give 30 g for BG less than or equal to 50 mg/dL IF patient is conscious and able to swallow. Do NOT give glucose gel via enteral tube.  IF patient has enteral tube: give apple juice 120 mL (4 oz or 15 g of CHO) via enteral tube for BG 51 to 69 mg/dL.  Give apple juice 240 mL (8 oz or 30 g of CHO) via enteral tube for BG less than or equal to 50 mg/dL.    ~Oral gel is preferable for conscious and able to swallow patient.   ~IF gel unavailable or patient refuses may provide apple juice 120 mL (4 oz or 15 g of CHO). Document juice on I and O flowsheet.         1727-Med Discontinued        Or    Dose: 25-50 mL Freq: EVERY 15 MIN PRN Route: IV  PRN Reason: low blood sugar  Start: 10/11/17 1330   End: 10/12/17 1727   Admin Instructions: Use if have IV access, BG less than 70 mg/dL and  meet dose criteria below:  Dose if conscious and alert (or disorientated) and NPO = 25 mL  Dose if unconscious / not alert = 50 mL  Vesicant.         1727-Med Discontinued        Or    Dose: 1 mg Freq: EVERY 15 MIN PRN Route: SC  PRN Reason: low blood sugar  PRN Comment: May repeat x 1 only  Start: 10/11/17 1330   End: 10/12/17 1727   Admin Instructions: May give SQ or IM. ONLY use glucagon IF patient has NO IV access AND is UNABLE to swallow AND blood glucose is LESS than or EQUAL to 50 mg/dL.         1727-Med Discontinued           Dose: 15-30 g Freq: EVERY 15 MIN PRN Route: PO  PRN Reason: low blood sugar  Start: 10/11/17 0654   End: 10/11/17 2228   Admin Instructions: Give 15 g for BG 51 to 69 mg/dL IF patient is conscious and able to swallow. Give 30 g for BG less than or equal to 50 mg/dL IF patient is conscious and able to swallow. Do NOT give glucose gel via enteral tube.  IF patient has enteral tube: give apple juice 120 mL (4 oz or 15 g of CHO) via enteral tube for BG 51 to 69 mg/dL.  Give apple juice 240 mL (8 oz or 30 g of CHO) via enteral tube for BG less than or equal to 50 mg/dL.    ~Oral gel is preferable for conscious and able to swallow patient.   ~IF gel unavailable or patient refuses may provide apple juice 120 mL (4 oz or 15 g of CHO). Document juice on I and O flowsheet.        2228-Med Discontinued         Or    Dose: 25-50 mL Freq: EVERY 15 MIN PRN Route: IV  PRN Reason: low blood sugar  Start: 10/11/17 0654   End: 10/11/17 2228   Admin Instructions: Use if have IV access, BG less than 70 mg/dL and meet dose criteria below:  Dose if conscious and alert (or disorientated) and NPO = 25 mL  Dose if unconscious / not alert = 50 mL  Vesicant.        2228-Med Discontinued         Or    Dose: 1 mg Freq: EVERY 15 MIN PRN Route: SC  PRN Reason: low blood sugar  PRN Comment: May repeat x 1 only  Start: 10/11/17 0654   End: 10/11/17 2228   Admin Instructions: May give SQ or IM. ONLY use glucagon IF  patient has NO IV access AND is UNABLE to swallow AND blood glucose is LESS than or EQUAL to 50 mg/dL.        2228-Med Discontinued            Dose: 1-12 Units Freq: EVERY 4 HOURS Route: SC  Start: 10/11/17 1600   End: 10/12/17 1721   Admin Instructions: Correction Scale - HIGH INSULIN RESISTANCE DOSING     Do Not give Correction Insulin if BG less than 140  For  - 164 give 1 unit.  For  - 189 give 2 units.  For  - 214 give 3 units.  For  - 239 give 4 units.  For  - 264 give 5 units.  For  - 289 give 6 units.  For  - 314 give 7 units.  For  - 339 give 8 units  For  - 364 give 9 units  For  - 389 give 10 units  For  - 414 give 11 units  For BG greater than or equal to 415 give 12 units  Check blood glucose Q4H and administer based on blood glucose.  Notify provider if glucose greater than or equal to 350 mg/dL after administration of correction dose.  If given at mealtime, must be administered 5 min before meal or immediately after.        (1730)-Not Given [C]       (2143)-Not Given [C]        0024 (2 Units)-Given       (0440)-Not Given [C]       0938 (1 Units)-Given       1411 (3 Units)-Given       1721-Med Discontinued  (1816)-Not Given               Dose: 5 Units Freq: EVERY MORNING BEFORE BREAKFAST Route: SC  Start: 10/11/17 0730   End: 10/12/17 1721       1101 (5 Units)-Given        0938 (5 Units)-Given       1721-Med Discontinued           Dose: 15 mg Freq: DAILY Route: PO  Start: 10/12/17 1730   End: 10/13/17 1253   Admin Instructions: Hold for SBP <100mmHG.         1825 (15 mg)-Given        (0905)-Not Given       1253-Med Discontinued     Medications 10/08/17 10/09/17 10/10/17 10/11/17 10/12/17 10/13/17 10/14/17

## 2017-10-11 NOTE — PROGRESS NOTES
" 10/11/17 1538   Quick Adds   Type of Visit Initial Occupational Therapy Evaluation   Living Environment   Lives With alone   Living Arrangements condominium   Home Accessibility tub/shower is not walk in   Number of Stairs to Enter Home 0   Number of Stairs Within Home 0   Transportation Available family or friend will provide  (pt does not drive at baseline )   Living Environment Comment Elevator access. Pt's daughter was present for session and reported she is unable to provide assistance at discharge due to her work schedule. Bathroom is equipped with a tub/shower with a shower chair. Standard toilet.    Self-Care   Usual Activity Tolerance good   Current Activity Tolerance fair   Equipment Currently Used at Home shower chair;walker, rolling   Activity/Exercise/Self-Care Comment Pt uses a FWW for mobility. She also has access to a 4WW and wheelchair.    Functional Level Prior   Ambulation 1-->assistive equipment   Transferring 1-->assistive equipment   Toileting 0-->independent   Bathing 0-->independent   Dressing 0-->independent   Eating 0-->independent   Communication 0-->understands/communicates without difficulty   Swallowing 0-->swallows foods/liquids without difficulty   Cognition 0 - no cognition issues reported   Fall history within last six months yes   Number of times patient has fallen within last six months 1   General Information   Onset of Illness/Injury or Date of Surgery - Date 10/11/17   Referring Physician Ketan   Patient/Family Goals Statement \"I would prefer to go home\"   Additional Occupational Profile Info/Pertinent History of Current Problem Pt is a 89 year old female who was admitted on 10/11 after a fall and was found to have a R distal radius impacted fracture and R hip subcutaneous hematoma.    Precautions/Limitations fall precautions   Weight-Bearing Status - RUE weight-bearing as tolerated   Weight-Bearing Status - RLE weight-bearing as tolerated   Pain Assessment   Patient Currently " "in Pain Yes, see Vital Sign flowsheet  (\"very high\")   Transfer Skill: Bed to Chair/Chair to Bed   Level of [place]: Bed to Chair minimum assist (75% patients effort)   Weight-Bearing Restrictions weight-bearing as tolerated   Assistive Device - Transfer Skill Bed to Chair Chair to Bed Rehab Eval rolling walker  (FWW)   Transfer Skill: Sit to Stand   Level of [place]: Sit/Stand contact guard   Transfer Skill: Sit to Stand weight-bearing as tolerated   Assistive Device for Transfer: Sit/Stand rolling walker  (FWW)   Transfer Skill: Toilet Transfer   Level of [place]: Toilet minimum assist (75% patients effort)   Assistive Device grab bars;rolling walker  (FWW)   Bathing   Level of [place] maximum assist (25% patients effort)   Upper Body Dressing   Level of [place]: Dress Upper Body moderate assist (50% patients effort)   Lower Body Dressing   Level of [place]: Dress Lower Body maximum assist (25% patients effort)   Toileting   Level of [place]: Toilet moderate assist (50% patients effort)   Grooming   Level of [place]: Grooming minimum assist (75% patients effort)   Eating/Self Feeding   Level of [place]: Eating minimum assist (75% patients effort)   Instrumental Activities of Daily Living (IADL)   Previous Responsibilities meal prep;medication management   Activities of Daily Living Analysis   Impairments Contributing to Impaired Activities of Daily Living balance impaired;pain;strength decreased   General Therapy Interventions   Planned Therapy Interventions ADL retraining;IADL retraining;transfer training;progressive activity/exercise   Clinical Impression   Criteria for Skilled Therapeutic Interventions Met yes, treatment indicated   OT Diagnosis Decreased independence with ADL/IADLs   Influenced by the following impairments Endurance, strength, pain, balance   Assessment of Occupational Performance 5 or more Performance Deficits   Identified Performance Deficits " "Dressing, bathing, groom/hyg, toileting, transfers, ambulation    Clinical Decision Making (Complexity) Moderate complexity   Therapy Frequency daily   Predicted Duration of Therapy Intervention (days/wks) 3 days   Anticipated Discharge Disposition Transitional Care Facility   Risks and Benefits of Treatment have been explained. Yes   Patient, Family & other staff in agreement with plan of care Yes   [facility] TM \"6 Clicks\"   2016, Trustees of [facility], under license to [employer].  All rights reserved.   6 Clicks Short Forms Daily Activity Inpatient Short Form   [facility]  \"6 Clicks\" Daily Activity Inpatient Short Form   1. Putting on and taking off regular lower body clothing? 2 - A Lot   2. Bathing (including washing, rinsing, drying)? 2 - A Lot   3. Toileting, which includes using toilet, bedpan or urinal? 2 - A Lot   4. Putting on and taking off regular upper body clothing? 2 - A Lot   5. Taking care of personal grooming such as brushing teeth? 3 - A Little   6. Eating meals? 3 - A Little   Daily Activity Raw Score (Score out of 24.Lower scores equate to lower levels of function) 14   Total Evaluation Time   Total Evaluation Time (Minutes) 10     "

## 2017-10-11 NOTE — ED NOTES
Had pt sit on edge of bed in attempt to have her stand and attempt to ambulate and pt became diaphoretic and nauseated and had dry heaves. Pt c/o a lot of pain in her right hip/buttock. Will notify MD of same. Pt assisted back to bed.

## 2017-10-11 NOTE — IP AVS SNAPSHOT
"` `     [facility] [place] [address] [facility]: [phone]                                              INTERAGENCY TRANSFER FORM - NURSING   10/11/2017                    Hospital Admission Date: 10/11/2017  SEGUN KELLY   : 1928  Sex: Female        Attending Provider: Nathaniel Ibarra MD     Allergies:  Cephalexin    Infection:  None   Service:  HOSPITALIST    Ht:  1.499 m (4' 11\")   Wt:  79.4 kg (175 lb 0.7 oz)   Admission Wt:  72.6 kg (160 lb)    BMI:  35.35 kg/m 2   BSA:  1.82 m 2            Patient PCP Information     Provider PCP Type    Kylee Liang [facility]      Current Code Status     Date Active Code Status Order ID Comments User Context       Prior      Code Status History     Date Active Date Inactive Code Status Order ID Comments User Context    10/14/2017  2:17 PM  Full Code [id number]  Lester Jacob MD Outpatient    10/11/2017  6:54 AM 10/14/2017  2:17 PM Full Code [id number]  Nathaniel Ibarra MD Inpatient      Advance Directives        Does patient have a scanned Advance Directive/ACP document in EPIC?           No        Hospital Problems as of 10/14/2017              Priority Class Noted POA    Fall Medium  10/11/2017 Yes    Right wrist pain Medium  10/14/2017 Yes    Contusion of right hip, initial encounter Medium  10/14/2017 Yes    Hip pain Medium  10/14/2017 Yes      Non-Hospital Problems as of 10/14/2017     None      Immunizations     Name Date      Influenza (High Dose) 3 valent vaccine 10/12/17          END      ASSESSMENT     Discharge Profile Flowsheet     EXPECTED DISCHARGE     FINAL RESOURCES      Expected Discharge Date  10/14/17 (to 10/15 TCU) 10/13/17 1246   [facility] Number  [id number] 10/13/17 1629    DISCHARGE NEEDS ASSESSMENT     SKIN      Equipment Currently Used at Home  shower chair;walker, rolling 10/11/17 1610   Inspection of bony prominences  Full 10/14/17 0916    Transportation Available  family or friend will provide (pt does not drive at " "baseline ) 10/11/17 1541   Inspection under devices  Full 10/14/17 0916    # of Referrals Placed by [facility]  Advance Care Planning 10/13/17 1120   Skin WDL  ex 10/14/17 0916    GASTROINTESTINAL (ADULT,PEDIATRIC,OB)     Full except areas not inspected   Buttock, left;Buttock, right;Scapula, right;Scapula, left;Coccyx;Sacrum 10/12/17 0219    GI WDL  ex 10/14/17 0916   Skin Temperature  warm 10/13/17 2004    Abdominal Appearance  obese 10/14/17 0916   Skin Moisture  dry 10/13/17 2004    Last Bowel Movement  10/13/17 10/13/17 2004   Skin Elasticity  quick return to original state 10/13/17 2004    GI Signs/Symptoms  abdominal discomfort;belching;dry heaving;nausea;vomiting 10/11/17 2249   Skin Integrity  bruise(s) 10/14/17 0916    Passing flatus  yes 10/13/17 2004   SAFETY      COMMUNICATION ASSESSMENT     Safety WDL  WDL 10/14/17 0916    Patient's communication style  spoken language (English or Bilingual) 10/11/17 0022   All Alarms  alarm(s) activated and audible 10/14/17 0916                 Assessment WDL (Within Defined Limits) Definitions           Safety WDL     Effective: 09/28/15    Row Information: <b>WDL Definition:</b> Bed in low position, wheels locked; call light in reach; upper side rails up x 2; ID band on<br> <font color=\"gray\"><i>Item=AS safety wdl>>List=AS safety wdl>>Version=F14</i></font>      Skin WDL     Effective: 09/28/15    Row Information: <b>WDL Definition:</b> Warm; dry; intact; elastic; without discoloration; pressure points without redness<br> <font color=\"gray\"><i>Item=AS skin wdl>>List=AS skin wdl>>Version=F14</i></font>      Vitals     Vital Signs Flowsheet     VITAL SIGNS     Response to Interventions  Absence of nonverbal indicators of pain 10/11/17 2206    Temp  98.7  F (37.1  C) 10/14/17 1625   ANALGESIA SIDE EFFECTS MONITORING      Temp src  Oral 10/14/17 1625   Side Effects Monitoring: Respiratory Quality  R 10/14/17 0911    Resp  16 10/14/17 1625   Side Effects Monitoring: Respiratory " "Depth  N 10/14/17 0911    Pulse  71 10/14/17 1625   Side Effects Monitoring: Sedation Level  1 10/14/17 0911    Pulse/Heart Rate Source  Monitor 10/14/17 1625   HEIGHT AND WEIGHT      BP  168/63 10/14/17 1626   Height  1.499 m (4' 11\") 10/11/17 0027    BP Location  Left arm 10/14/17 1626   Height Method  Stated 10/11/17 0027    LYING ORTHOSTATIC BP     Weight  79.4 kg (175 lb 0.7 oz) 10/14/17 1207    Lying Orthostatic BP  109/54 10/11/17 0755   Weight Method  Bed scale 10/14/17 1207    Lying Orthostatic Pulse  96 bpm 10/11/17 0755   BSA (Calculated - sq m)  1.74 10/11/17 0027    SITTING ORTHOSTATIC BP     BMI (Calculated)  32.38 10/11/17 0027    Sitting Orthostatic BP  81/38 10/11/17 0755   WILLIE COMA SCALE      Sitting Orthostatic Pulse  60 bpm 10/11/17 0755   Best Eye Response  4-->(E4) spontaneous 10/11/17 0952    OXYGEN THERAPY     Best Motor Response  6-->(M6) obeys commands 10/11/17 0952    SpO2  94 % 10/14/17 1625   Best Verbal Response  5-->(V5) oriented 10/11/17 0952    O2 Device  None (Room air) 10/14/17 1625   [place] Coma Scale Score  15 10/11/17 0952    Oxygen Delivery  3 LPM 10/11/17 1637   DAILY CARE      PAIN/COMFORT     Activity Management  ambulated to bathroom 10/14/17 1626    Patient Currently in Pain  denies 10/14/17 0911   Activity Assistance Provided  assistance, 1 person 10/14/17 1626    Preferred Pain Scale  number (Numeric Rating Pain Scale) 10/12/17 0959   Assistive Device Utilized  gait belt;walker 10/14/17 1626    0-10 Pain Scale  1 10/12/17 0959   POSITIONING      Pain Location  Wrist 10/12/17 0959   Body Position  independently positioning 10/14/17 0916    Pain Orientation  Right 10/12/17 0959   Head of Bed (HOB)  HOB at 20-30 degrees 10/14/17 0916    Pain Descriptors  Aching 10/12/17 0959   Positioning/Transfer Devices  pillows 10/14/17 0916    Pain Intervention(s)  Declines 10/12/17 0959   Chair  Upright in chair 10/13/17 1007            Patient Lines/Drains/Airways Status  "   Active LINES/DRAINS/AIRWAYS     Name: Placement date: Placement time: Site: Days: Last dressing change:    Peripheral IV 10/13/17 Left Hand 10/13/17   0930   Hand   1     Incision/Surgical Site 11/26/13 Right;Lower;Quadrant Abdomen 11/26/13   1058    1418     Incision/Surgical Site 11/26/13 Posterior;Right Back 11/26/13   1123    1418             Patient Lines/Drains/Airways Status    Active PICC/CVC     None            Intake/Output Detail Report     Date Intake       Output Net    Shift P.O. I.V. IV Piggyback Blood Components Total Urine Total       Day 10/13/17 0700 - 10/13/17 1459 240 -- -- -- 240 150 150 90    Taylor 10/13/17 1500 - 10/13/17 2259 440 -- -- 300 740 -- -- 740    Noc 10/13/17 2300 - 10/14/17 0659 150 -- -- -- 150 -- -- 150    Day 10/14/17 0700 - 10/14/17 1459 250 -- -- -- 250 -- -- 250    Taylor 10/14/17 1500 - 10/14/17 2259 200 -- -- -- 200 -- -- 200      Last Void/BM       Most Recent Value    Urine Occurrence 1 at 10/14/2017 1625    Stool Occurrence 1 at 10/13/2017 1345      Case Management/Discharge Planning     Case Management/Discharge Planning Flowsheet     REFERRAL INFORMATION     Expected Discharge Date  10/14/17 (to 10/15 TCU) 10/13/17 1246    Did the Initial Social Work Assessment result in a Social Work Case?  Yes 10/12/17 1406   DISCHARGE PLANNING      Admission Type  inpatient 10/12/17 1406   Transportation Available  family or friend will provide (pt does not drive at baseline ) 10/11/17 1541    Arrived From  home or self-care 10/12/17 1406   FINAL RESOURCES      Referral Source  physician 10/12/17 1406   Equipment Currently Used at Home  shower chair;walker, rolling 10/11/17 1610    # of Referrals Placed by [facility]  Advance Care Planning 10/13/17 1120   PAS Number  [id number] 10/13/17 1629    Reason For Consult  discharge planning 10/12/17 1406   ABUSE RISK SCREEN      Record Reviewed  medical record 10/12/17 1406   QUESTION TO PATIENT:  Has a member of your family or a partner(now or in  the past) intimidated, hurt, manipulated, or controlled you in any way?  no 10/11/17 0029    CTS Assigned to Case  Rhonda Hernandez RN 10/13/17 1120   QUESTION TO PATIENT: Do you feel safe going back to the place where you are living?  yes 10/11/17 0029    LIVING ENVIRONMENT     OBSERVATION: Is there reason to believe there has been maltreatment of a vulnerable adult (ie. Physical/Sexual/Emotional abuse, self neglect, lack of adequate food, shelter, medical care, or financial exploitation)?  no 10/11/17 0029    Lives With  alone 10/12/17 1406   (R) MENTAL HEALTH SUICIDE RISK      Living Arrangements  condominium 10/12/17 1406   Are you depressed or being treated for depression?  No 10/11/17 0837    COPING/STRESS     HOMICIDE RISK      Major Change/Loss/Stressor  hospitalization 10/11/17 0836   Feels Like Hurting Others  no 10/11/17 0029    EXPECTED DISCHARGE

## 2017-10-11 NOTE — IP AVS SNAPSHOT
"          [place] [address] [facility]: [phone]                                              INTERAGENCY TRANSFER FORM - LAB / IMAGING / EKG / EMG RESULTS   10/11/2017                    Hospital Admission Date: 10/11/2017  SEGUN KELLY   : 1928  Sex: Female        Attending Provider: Nathaniel Ibarra MD     Allergies:  Cephalexin    Infection:  None   Service:  HOSPITALIST    Ht:  1.499 m (4' 11\")   Wt:  77.1 kg (170 lb)   Admission Wt:  72.6 kg (160 lb)    BMI:  34.34 kg/m 2   BSA:  1.79 m 2            Patient PCP Information     Provider PCP Type    [facility]         Lab Results - 3 Days      Glucose by meter [[id number]] (Abnormal)  Resulted: 10/12/17 1216, Result status: Final result    Ordering provider: Nathaniel Ibarra MD  10/12/17 1214 Resulting lab: POINT OF CARE TEST, GLUCOSE    Specimen Information    Type Source Collected On     10/12/17 1214          Components       Value Reference Range Flag Lab   Glucose 207 70 - 99 mg/dL H 170            Glucose by meter [[id number]] (Abnormal)  Resulted: 10/12/17 1201, Result status: Final result    Ordering provider: Nathaniel Ibarra MD  10/12/17 0847 Resulting lab: POINT OF CARE TEST, GLUCOSE    Specimen Information    Type Source Collected On     10/12/17 0847          Components       Value Reference Range Flag Lab   Glucose 154 70 - 99 mg/dL H 170            Basic metabolic panel [[id number]] (Abnormal)  Resulted: 10/12/17 0733, Result status: Final result    Ordering provider: Nathaniel Ibarra MD  10/12/17 0000 Resulting lab: [facility]    Specimen Information    Type Source Collected On   Blood  10/12/17 0655          Components       Value Reference Range Flag Lab   Sodium 141 133 - 144 mmol/L  FrStHsLb   Potassium 3.8 3.4 - 5.3 mmol/L  FrStHsLb   Chloride 107 94 - 109 mmol/L  FrStHsLb   Carbon Dioxide 27 20 - 32 mmol/L  FrStHsLb   Anion Gap 7 3 - 14 mmol/L  FrStHsLb   Glucose 142 70 - 99 mg/dL " H FrStHsLb   Urea Nitrogen 20 7 - 30 mg/dL  FrStHsLb   Creatinine 1.16 0.52 - 1.04 mg/dL H FrStHsLb   GFR Estimate 44 >60 mL/min/1.7m2 L FrStHsLb   Comment:  Non  GFR Calc   GFR Estimate If Black 53 >60 mL/min/1.7m2 L FrStHsLb   Comment:  African American GFR Calc   Calcium 7.4 8.5 - 10.1 mg/dL L FrStHsLb            Glucose by meter [[id number]] (Abnormal)  Resulted: 10/12/17 0731, Result status: Final result    Ordering provider: Nathaniel Ibarra MD  10/12/17 0439 Resulting lab: POINT OF CARE TEST, GLUCOSE    Specimen Information    Type Source Collected On     10/12/17 0439          Components       Value Reference Range Flag Lab   Glucose 134 70 - 99 mg/dL H 170            Hemoglobin A1c [[id number]] (Abnormal)  Resulted: 10/12/17 0726, Result status: Final result    Ordering provider: Nathaniel Ibarra MD  10/12/17 0000 Resulting lab: [facility]    Specimen Information    Type Source Collected On   Blood  10/12/17 0655          Components       Value Reference Range Flag Lab   Hemoglobin A1C 6.8 4.3 - 6.0 % H FrStHsLb            CBC with platelets differential [[id number]] (Abnormal)  Resulted: 10/12/17 0709, Result status: Final result    Ordering provider: Nathaniel Ibarra MD  10/12/17 0000 Resulting lab: [facility]    Specimen Information    Type Source Collected On   Blood  10/12/17 0655          Components       Value Reference Range Flag Lab   WBC 6.6 4.0 - 11.0 10e9/L  FrStHsLb   RBC Count 2.63 3.8 - 5.2 10e12/L L FrStHsLb   Hemoglobin 7.7 11.7 - 15.7 g/dL L FrStHsLb   Hematocrit 22.9 35.0 - 47.0 % L FrStHsLb   MCV 87 78 - 100 fl  FrStHsLb   MCH 29.3 26.5 - 33.0 pg  FrStHsLb   MCHC 33.6 31.5 - 36.5 g/dL  FrStHsLb   RDW 14.1 10.0 - 15.0 %  FrStHsLb   Platelet Count 127 150 - 450 10e9/L L FrStHsLb   Diff Method Automated Method   FrStHsLb   % Neutrophils 65.3 %  FrStHsLb   % Lymphocytes 18.7 %  FrStHsLb   % Monocytes 14.5 %  FrStHsLb   % Eosinophils 1.2 %   FrStHsLb   % Basophils 0.0 %  FrStHsLb   % Immature Granulocytes 0.3 %  FrStHsLb   Nucleated RBCs 0 0 /100  FrStHsLb   Absolute Neutrophil 4.3 1.6 - 8.3 10e9/L  FrStHsLb   Absolute Lymphocytes 1.2 0.8 - 5.3 10e9/L  FrStHsLb   Absolute Monocytes 1.0 0.0 - 1.3 10e9/L  FrStHsLb   Absolute Eosinophils 0.1 0.0 - 0.7 10e9/L  FrStHsLb   Absolute Basophils 0.0 0.0 - 0.2 10e9/L  FrStHsLb   Abs Immature Granulocytes 0.0 0 - 0.4 10e9/L  FrStHsLb   Absolute Nucleated RBC 0.0   FrStHsLb            Glucose by meter [[id number]] (Abnormal)  Resulted: 10/12/17 0026, Result status: Final result    Ordering provider: Nathaniel Ibarra MD  10/12/17 0018 Resulting lab: POINT OF CARE TEST, GLUCOSE    Specimen Information    Type Source Collected On     10/12/17 0018          Components       Value Reference Range Flag Lab   Glucose 176 70 - 99 mg/dL H 170            Hemoglobin and hematocrit [[id number]] (Abnormal)  Resulted: 10/11/17 2349, Result status: Final result    Ordering provider: Celso Oliveira DO  10/11/17 1500 Resulting lab: [facility]    Specimen Information    Type Source Collected On   Blood  10/11/17 2345          Components       Value Reference Range Flag Lab   Hemoglobin 8.4 11.7 - 15.7 g/dL L FrStHsLb   Hematocrit 25.2 35.0 - 47.0 % L FrStHsLb            Glucose by meter [[id number]] (Abnormal)  Resulted: 10/11/17 2151, Result status: Final result    Ordering provider: Nathaniel Ibarra MD  10/11/17 2142 Resulting lab: POINT OF CARE TEST, GLUCOSE    Specimen Information    Type Source Collected On     10/11/17 2142          Components       Value Reference Range Flag Lab   Glucose 184 70 - 99 mg/dL H 170            Glucose by meter [[id number]] (Abnormal)  Resulted: 10/11/17 2136, Result status: Final result    Ordering provider: Nathaniel Ibarra MD  10/11/17 1744 Resulting lab: POINT OF CARE TEST, GLUCOSE    Specimen Information    Type Source Collected On     10/11/17 1744          Components        Value Reference Range Flag Lab   Glucose 231 70 - 99 mg/dL H 170            Hemoglobin and hematocrit [[id number]] (Abnormal)  Resulted: 10/11/17 1522, Result status: Final result    Ordering provider: Celso Oliveira DO  10/11/17 0900 Resulting lab: [facility]    Specimen Information    Type Source Collected On   Blood  10/11/17 1505          Components       Value Reference Range Flag Lab   Hemoglobin 9.1 11.7 - 15.7 g/dL L FrStHsLb   Hematocrit 27.6 35.0 - 47.0 % L FrStHsLb            Glucose by meter [[id number]] (Abnormal)  Resulted: 10/11/17 1141, Result status: Final result    Ordering provider: Nathaniel Ibarra MD  10/11/17 1055 Resulting lab: POINT OF CARE TEST, GLUCOSE    Specimen Information    Type Source Collected On     10/11/17 1055          Components       Value Reference Range Flag Lab   Glucose 293 70 - 99 mg/dL H 170            Glucose by meter [[id number]] (Abnormal)  Resulted: 10/11/17 1141, Result status: Final result    Ordering provider: Nathaniel Ibarra MD  10/11/17 0803 Resulting lab: POINT OF CARE TEST, GLUCOSE    Specimen Information    Type Source Collected On     10/11/17 0803          Components       Value Reference Range Flag Lab   Glucose 291 70 - 99 mg/dL H 170            Basic metabolic panel [[id number]] (Abnormal)  Resulted: 10/11/17 1003, Result status: Final result    Ordering provider: Jem Howard MD  10/11/17 0842 Resulting lab: [facility]    Specimen Information    Type Source Collected On   Blood  10/11/17 0940          Components       Value Reference Range Flag Lab   Sodium 139 133 - 144 mmol/L  FrStHsLb   Potassium 4.6 3.4 - 5.3 mmol/L  FrStHsLb   Chloride 106 94 - 109 mmol/L  FrStHsLb   Carbon Dioxide 25 20 - 32 mmol/L  FrStHsLb   Anion Gap 8 3 - 14 mmol/L  FrStHsLb   Glucose 330 70 - 99 mg/dL H FrStHsLb   Urea Nitrogen 25 7 - 30 mg/dL  FrStHsLb   Creatinine 1.19 0.52 - 1.04 mg/dL H FrStHsLb   GFR Estimate  43 >60 mL/min/1.7m2 L FrStHsLb   Comment:  Non  GFR Calc   GFR Estimate If Black 52 >60 mL/min/1.7m2 L FrStHsLb   Comment:  African American GFR Calc   Calcium 7.6 8.5 - 10.1 mg/dL L FrStHsLb            Hemoglobin and hematocrit [[id number]] (Abnormal)  Resulted: 10/11/17 0951, Result status: Final result    Ordering provider: Nathaniel Ibarra MD  10/11/17 0901 Resulting lab: [facility]    Specimen Information    Type Source Collected On     10/11/17 0940          Components       Value Reference Range Flag Lab   Hemoglobin 9.5 11.7 - 15.7 g/dL L FrStHsLb   Comment:  Delta Verified   Hematocrit 28.3 35.0 - 47.0 % L FrStHsLb            Comprehensive metabolic panel [[id number]] (Abnormal)  Resulted: 10/11/17 0331, Result status: Final result    Ordering provider: Curtis Lucas MD  10/11/17 0257 Resulting lab: [facility]    Specimen Information    Type Source Collected On   Blood  10/11/17 0030          Components       Value Reference Range Flag Lab   Sodium 141 133 - 144 mmol/L  FrStHsLb   Potassium 3.8 3.4 - 5.3 mmol/L  FrStHsLb   Chloride 104 94 - 109 mmol/L  FrStHsLb   Carbon Dioxide 29 20 - 32 mmol/L  FrStHsLb   Anion Gap 8 3 - 14 mmol/L  FrStHsLb   Glucose 148 70 - 99 mg/dL H FrStHsLb   Urea Nitrogen 21 7 - 30 mg/dL  FrStHsLb   Creatinine 1.20 0.52 - 1.04 mg/dL H FrStHsLb   GFR Estimate 42 >60 mL/min/1.7m2 L FrStHsLb   Comment:  Non  GFR Calc   GFR Estimate If Black 51 >60 mL/min/1.7m2 L FrStHsLb   Comment:  African American GFR Calc   Calcium 7.9 8.5 - 10.1 mg/dL L FrStHsLb   Bilirubin Total 0.4 0.2 - 1.3 mg/dL  FrStHsLb   Albumin 3.1 3.4 - 5.0 g/dL L FrStHsLb   Protein Total 6.0 6.8 - 8.8 g/dL L FrStHsLb   Alkaline Phosphatase 49 40 - 150 U/L  FrStHsLb   ALT 21 0 - 50 U/L  FrStHsLb   AST 22 0 - 45 U/L  FrStHsLb            Troponin I (now) [[id number]]  Resulted: 10/11/17 0331, Result status: Final result    Ordering provider: Curtis Lucas  MD Amanda  10/11/17 0257 Resulting lab: [facility]    Specimen Information    Type Source Collected On   Blood  10/11/17 0030          Components       Value Reference Range Flag Lab   Troponin I ES <0.015 0.000 - 0.045 ug/L  FrStHsLb   Comment:         The 99th percentile for upper reference range is 0.045 ug/L.  Troponin values   in the range of 0.045 - 0.120 ug/L may be associated with risks of adverse   clinical events.              Lipase [[id number]]  Resulted: 10/11/17 0326, Result status: Final result    Ordering provider: Curtis Lucas MD  10/11/17 0257 Resulting lab: [facility]    Specimen Information    Type Source Collected On   Blood  10/11/17 0030          Components       Value Reference Range Flag Lab   Lipase 131 73 - 393 U/L  FrStHsLb            CBC with platelets + differential [[id number]] (Abnormal)  Resulted: 10/11/17 0310, Result status: Final result    Ordering provider: Curtis Lucas MD  10/11/17 0257 Resulting lab: [facility]    Specimen Information    Type Source Collected On   Blood  10/11/17 0030          Components       Value Reference Range Flag Lab   WBC 4.9 4.0 - 11.0 10e9/L  FrStHsLb   RBC Count 4.37 3.8 - 5.2 10e12/L  FrStHsLb   Hemoglobin 12.6 11.7 - 15.7 g/dL  FrStHsLb   Hematocrit 38.2 35.0 - 47.0 %  FrStHsLb   MCV 87 78 - 100 fl  FrStHsLb   MCH 28.8 26.5 - 33.0 pg  FrStHsLb   MCHC 33.0 31.5 - 36.5 g/dL  FrStHsLb   RDW 13.5 10.0 - 15.0 %  FrStHsLb   Platelet Count 135 150 - 450 10e9/L L FrStHsLb   Diff Method Automated Method   FrStHsLb   % Neutrophils 63.6 %  FrStHsLb   % Lymphocytes 18.4 %  FrStHsLb   % Monocytes 12.9 %  FrStHsLb   % Eosinophils 4.7 %  FrStHsLb   % Basophils 0.2 %  FrStHsLb   % Immature Granulocytes 0.2 %  FrStHsLb   Nucleated RBCs 0 0 /100  FrStHsLb   Absolute Neutrophil 3.1 1.6 - 8.3 10e9/L  FrStHsLb   Absolute Lymphocytes 0.9 0.8 - 5.3 10e9/L  FrStHsLb   Absolute Monocytes 0.6 0.0 - 1.3 10e9/L   "FrStHsLb   Absolute Eosinophils 0.2 0.0 - 0.7 10e9/L  FrStHsLb   Absolute Basophils 0.0 0.0 - 0.2 10e9/L  FrStHsLb   Abs Immature Granulocytes 0.0 0 - 0.4 10e9/L  FrStHsLb   Absolute Nucleated RBC 0.0   FrStHsLb            Testing Performed By     Lab - Abbreviation Name Director Address Valid Date Range    14 - FrStHsLb [facility] Unknown [address] [place] [address] 05/08/15 1057 - Present    170 - Unknown POINT OF CARE TEST, GLUCOSE Unknown Unknown 10/31/11 1114 - Present            Unresulted Labs (24h ago through future)    Start       Ordered    Unscheduled  Potassium  (Potassium Replacement - \"Standard\" - For K levels less than 3.4 mmol/L - UU,UR,UA,RH,SH,PH,WY )  CONDITIONAL (SPECIFY),   Routine     Comments:  Obtain Potassium Level for these conditions:  *IF no potassium result within 24 hours before initiation of order set, draw potassium level with next lab collect.    *2 HOURS AFTER last IV potassium replacement dose and 4 hours after an oral replacement dose.  *Next morning after potassium dose.     Repeat Potassium Replacement if necessary.    10/11/17 0654         Imaging Results - 3 Days      CT Hip Right w/o Contrast [[id number]]  Resulted: 10/11/17 0629, Result status: Final result    Ordering provider: Curtis Lucas MD  10/11/17 0447 Resulted by: Kenroy Zhao MD    Performed: 10/11/17 0518 - 10/11/17 0533 Resulting lab: RADIOLOGY RESULTS    Narrative:       CT PELVIC BONE WITHOUT CONTRAST  10/11/2017 5:33 AM     HISTORY: Fall. Pain. Evaluate for occult fracture. Large hematoma on  exam.    COMPARISON: None.    TECHNIQUE: Without intravenous or oral contrast, helical sections were  acquired from the iliac crests through the pubic symphysis. Coronal  and sagittal reconstructions were generated. Radiation dose for this  scan was reduced using automated exposure control, adjustment of the  mA and/or kV according to the patient's size, or iterative  reconstruction " technique.    FINDINGS: No visualized acute fractures or malalignment of the pelvic  bone or right hip. An irregular-shaped high attenuation mass-like  structure is present in the deep subcutaneous tissues of the lateral  aspect of the right hip measuring 16 cm in anteroposterior dimension,  6 cm in transverse dimension, and 15 cm in craniocaudal dimension  (series 3 image 84). There are hazy opacities in the surrounding fat.  This likely represents a hematoma.    The visualized portions of the small and large bowel are normal in  caliber. A few colonic diverticula are present without evidence of  diverticulitis. The uterus is not visualized. No enlarged lymph nodes  or free fluid in the pelvis. Very small bilateral inguinal hernias  containing fat.      Impression:       IMPRESSION:  1. No visualized acute fracture or malalignment of the pelvis or right  hip.  2. Large hematoma in the subcutaneous tissues at the lateral aspect of  the right hip.    KENROY ZHAO MD      Head CT w/o contrast [[id number]]  Resulted: 10/11/17 0139, Result status: Final result    Ordering provider: Curtis Lucas MD  10/11/17 0043 Resulted by: Kenroy Zhao MD    Performed: 10/11/17 0109 - 10/11/17 0119 Resulting lab: RADIOLOGY RESULTS    Narrative:       CT HEAD WITHOUT CONTRAST  10/11/2017 1:19 AM     HISTORY: Fall. Vomiting.    COMPARISON: 12/6/2007.    TECHNIQUE: Without intravenous contrast, helical sections were  acquired through the brain. Coronal reconstructions were generated.  Radiation dose for this scan was reduced using automated exposure  control, adjustment of the mA and/or kV according to the patient's  size, or iterative reconstruction technique.    FINDINGS: Mild diffuse cerebral atrophy. Mild bilateral  periventricular white matter low attenuation, likely relating to  chronic small vessel ischemic disease. No intra-axial mass, mass  effect or midline shift. Normal gray-white matter differentiation.  No  visualized acute intracranial hemorrhage. The cerebral ventricles are  normal in caliber. The basal cisterns are patent. No extra-axial fluid  collection. The visualized portions of the paranasal sinuses and  mastoid air cells are unremarkable.      Impression:       IMPRESSION: No evidence of acute intracranial abnormality.    KENROY ZHAO MD      XR Pelvis and Hip Right 1 View [[id number]]  Resulted: 10/11/17 0117, Result status: Final result    Ordering provider: Curtis Lucas MD  10/11/17 0043 Resulted by: Kenroy Zhao MD    Performed: 10/11/17 0103 - 10/11/17 0103 Resulting lab: RADIOLOGY RESULTS    Narrative:       PELVIS AND RIGHT HIP 2 VIEWS  10/11/2017 1:03 AM     HISTORY: Pain.    COMPARISON: None.      Impression:       IMPRESSION: No visualized acute fracture, malalignment or other acute  osseous abnormality of the visualized portions of the pelvis or right  hip.     KENROY ZHAO MD      Wrist XR, G/E 3 views, right [[id number]]  Resulted: 10/11/17 0117, Result status: Final result    Ordering provider: Curtis Lucas MD  10/11/17 0043 Resulted by: Kenroy Zhao MD    Performed: 10/11/17 0103 - 10/11/17 0104 Resulting lab: RADIOLOGY RESULTS    Narrative:       RIGHT WRIST 3 VIEWS  10/11/2017 1:04 AM     HISTORY: Fall. Pain.    COMPARISON: None.      Impression:       IMPRESSION:  1. A subtle linear transverse band of relatively increased density  within the distal right radial metaphysis. This is not convincing for  an acute fracture, but a minimally impacted acute fracture cannot be  entirely excluded.  2. Normal alignment of the right wrist.  3. Diffuse osteopenia.  4. Severe degenerative changes in the right first carpometacarpal  joint.    KENROY ZHAO MD      Testing Performed By     Lab - Abbreviation Name Director Address Valid Date Range    104 - Rad Rslts RADIOLOGY RESULTS Unknown Unknown 02/16/05 1553 - Present               ECG/EMG Results       Echocardiogram Complete [[id number]]  Resulted: 10/12/17 1121, Result status: In process    Ordering provider: Toñito Gardner DO  10/11/17 1003 Performed: 10/12/17 1121 - 10/12/17 112    Resulting lab: RADIANT             Echo Complete with Lumason [[id number]]  Resulted: 10/12/17 1134, Result status: Edited Result - FINAL    Ordering provider: Toñito Gardner DO  10/11/17 1003 Resulted by: Andres Phoenix MD    Performed: 10/12/17 1121 - 10/12/17 1216 Resulting lab: RADIOLOGY RESULTS    Narrative:       [id number]  ECH  [id number]  [id number]^KENDRA^TOÑITO^MARLON           [facility]  Echocardiography Laboratory  [address] [place] [address]        Name: SEGUN KELLY  MRN: [id number]  : 1928  Study Date: 10/12/2017 11:34 AM  Age: 89 yrs  Gender: Female  Patient Location: [facility]  Reason For Study: Syncope and Collapse  Ordering Physician: TOÑITO GARDNER  Referring Physician: TOÑITO GARDNER  Performed By: MALVIN Carey     BSA: 1.7 m2  Height: 59 in  Weight: 170 lb  HR: 79  BP: 148/67 mmHg  _____________________________________________________________________________  __        Procedure  Complete Portable Echo Adult. Contrast Optison.  _____________________________________________________________________________  __        Interpretation Summary     The left ventricle is normal in size. Proximal septal thickening is noted.  Hyperdynamic left ventricular function. The visual ejection fraction is  estimated at 65-70%. Grade I or early diastolic dysfunction. No regional wall  motion abnormalities noted. There is no thrombus seen in the left ventricle.  The right ventricle is normal size. The right ventricular systolic function is  normal.  Trace to mild mitral and tricuspid regurgitation.  There is trace aortic regurgitation. Moderate valvular aortic stenosis. The  peak AoV pressure gradient is 41.0 mmHg. The mean AoV pressure gradient is  23.3 mmHg. The  calculated aortic valve are is 1.1 cm^2.  No pericardial effusion.  In comparison to the previous report dated 12/2/2007, there has been an  interval progression in the degree of aortic stenosis (previously reported as  mild).  _____________________________________________________________________________  __        Left Ventricle  The left ventricle is normal in size. Proximal septal thickening is noted.  Hyperdynamic left ventricular function. The visual ejection fraction is  estimated at 65-70%. Grade I or early diastolic dysfunction. No regional wall  motion abnormalities noted. There is no thrombus seen in the left ventricle.     Right Ventricle  The right ventricle is normal size. The right ventricular systolic function is  normal.     Atria  There is mild biatrial enlargement. There is no color Doppler evidence of an  atrial shunt.     Mitral Valve  The mitral valve leaflets are mildly thickened. There is mild mitral annular  calcification. There is trace to mild mitral regurgitation.        Tricuspid Valve  There is trace tricuspid regurgitation.     Aortic Valve  There is trace aortic regurgitation. Moderate valvular aortic stenosis. The  peak AoV pressure gradient is 41.0 mmHg. The mean AoV pressure gradient is  23.3 mmHg. The calculated aortic valve are is 1.1 cm^2.     Pulmonic Valve  There is no pulmonic valvular stenosis.     Vessels  The aortic root is normal size. The IVC is normal in size and reactivity with  respiration, suggesting normal central venous pressure.     Pericardium  There is no pericardial effusion.        Rhythm  Sinus rhythm was noted.  _____________________________________________________________________________  __  MMode/2D Measurements & Calculations  IVSd: 1.3 cm     LVIDd: 2.4 cm  LVIDs: 1.7 cm  LVPWd: 1.1 cm  FS: 32.1 %  EDV(Teich): 21.0 ml  ESV(Teich): 7.8 ml  LV mass(C)d: 84.7 grams  LV mass(C)dI: 49.2 grams/m2  Ao root diam: 2.6 cm  LVOT diam: 1.9 cm  LVOT area: 2.9 cm2  LA  Volume (BP): 62.8 ml  LA Volume Index (BP): 36.5 ml/m2        Doppler Measurements & Calculations  MV E max bhumi: 93.0 cm/sec  MV A max bhumi: 152.0 cm/sec  MV E/A: 0.61     MV dec slope: 533.0 cm/sec2  Ao V2 max: 319.1 cm/sec  Ao max P.0 mmHg  Ao V2 mean: 227.0 cm/sec  Ao mean P.3 mmHg  Ao V2 VTI: 60.1 cm  VANESSA(I,D): 1.1 cm2  VANESSA(V,D): 1.1 cm2  LV V1 max P.4 mmHg  LV V1 max: 126.4 cm/sec  LV V1 VTI: 23.5 cm  SV(LVOT): 67.7 ml  SI(LVOT): 39.4 ml/m2  VANESSA Index (cm2/m2): 0.65  Lateral E/e': 6.8  Med E to E': 10.3  Medial E/e': 10.3           _____________________________________________________________________________  __           Report approved by: Richelle England 10/12/2017 01:14 PM       1    Type Source Collected On     10/12/17 1134          View Image (below)              Encounter-Level Documents:     There are no encounter-level documents.      Order-Level Documents:     There are no order-level documents.

## 2017-10-11 NOTE — ED NOTES
Bed: ED19  Expected date: 10/11/17  Expected time: 12:20 AM  Means of arrival: Ambulance  Comments:  [facility] [place] [facility] 81F fall; hip inj

## 2017-10-12 ENCOUNTER — APPOINTMENT (OUTPATIENT)
Dept: PHYSICAL THERAPY | Facility: CLINIC | Age: 82
DRG: 605 | End: 2017-10-12
Attending: ORTHOPAEDIC SURGERY
Payer: MEDICARE

## 2017-10-12 ENCOUNTER — APPOINTMENT (OUTPATIENT)
Dept: CARDIOLOGY | Facility: CLINIC | Age: 82
DRG: 605 | End: 2017-10-12
Attending: HOSPITALIST
Payer: MEDICARE

## 2017-10-12 ENCOUNTER — APPOINTMENT (OUTPATIENT)
Dept: OCCUPATIONAL THERAPY | Facility: CLINIC | Age: 82
DRG: 605 | End: 2017-10-12
Payer: MEDICARE

## 2017-10-12 LAB
ANION GAP SERPL CALCULATED.3IONS-SCNC: 7 MMOL/L (ref 3–14)
BASOPHILS # BLD AUTO: 0 10E9/L (ref 0–0.2)
BASOPHILS NFR BLD AUTO: 0 %
BUN SERPL-MCNC: 20 MG/DL (ref 7–30)
CALCIUM SERPL-MCNC: 7.4 MG/DL (ref 8.5–10.1)
CHLORIDE SERPL-SCNC: 107 MMOL/L (ref 94–109)
CO2 SERPL-SCNC: 27 MMOL/L (ref 20–32)
CREAT SERPL-MCNC: 1.16 MG/DL (ref 0.52–1.04)
DIFFERENTIAL METHOD BLD: ABNORMAL
EOSINOPHIL # BLD AUTO: 0.1 10E9/L (ref 0–0.7)
EOSINOPHIL NFR BLD AUTO: 1.2 %
ERYTHROCYTE [DISTWIDTH] IN BLOOD BY AUTOMATED COUNT: 14.1 % (ref 10–15)
GFR SERPL CREATININE-BSD FRML MDRD: 44 ML/MIN/1.7M2
GLUCOSE BLDC GLUCOMTR-MCNC: 121 MG/DL (ref 70–99)
GLUCOSE BLDC GLUCOMTR-MCNC: 134 MG/DL (ref 70–99)
GLUCOSE BLDC GLUCOMTR-MCNC: 154 MG/DL (ref 70–99)
GLUCOSE BLDC GLUCOMTR-MCNC: 176 MG/DL (ref 70–99)
GLUCOSE BLDC GLUCOMTR-MCNC: 181 MG/DL (ref 70–99)
GLUCOSE BLDC GLUCOMTR-MCNC: 207 MG/DL (ref 70–99)
GLUCOSE SERPL-MCNC: 142 MG/DL (ref 70–99)
HBA1C MFR BLD: 6.8 % (ref 4.3–6)
HCT VFR BLD AUTO: 22.9 % (ref 35–47)
HGB BLD-MCNC: 7.7 G/DL (ref 11.7–15.7)
IMM GRANULOCYTES # BLD: 0 10E9/L (ref 0–0.4)
IMM GRANULOCYTES NFR BLD: 0.3 %
LYMPHOCYTES # BLD AUTO: 1.2 10E9/L (ref 0.8–5.3)
LYMPHOCYTES NFR BLD AUTO: 18.7 %
MCH RBC QN AUTO: 29.3 PG (ref 26.5–33)
MCHC RBC AUTO-ENTMCNC: 33.6 G/DL (ref 31.5–36.5)
MCV RBC AUTO: 87 FL (ref 78–100)
MONOCYTES # BLD AUTO: 1 10E9/L (ref 0–1.3)
MONOCYTES NFR BLD AUTO: 14.5 %
NEUTROPHILS # BLD AUTO: 4.3 10E9/L (ref 1.6–8.3)
NEUTROPHILS NFR BLD AUTO: 65.3 %
NRBC # BLD AUTO: 0 10*3/UL
NRBC BLD AUTO-RTO: 0 /100
PLATELET # BLD AUTO: 127 10E9/L (ref 150–450)
POTASSIUM SERPL-SCNC: 3.8 MMOL/L (ref 3.4–5.3)
RBC # BLD AUTO: 2.63 10E12/L (ref 3.8–5.2)
SODIUM SERPL-SCNC: 141 MMOL/L (ref 133–144)
WBC # BLD AUTO: 6.6 10E9/L (ref 4–11)

## 2017-10-12 PROCEDURE — 36415 COLL VENOUS BLD VENIPUNCTURE: CPT | Performed by: HOSPITALIST

## 2017-10-12 PROCEDURE — 97110 THERAPEUTIC EXERCISES: CPT | Mod: GP

## 2017-10-12 PROCEDURE — A9270 NON-COVERED ITEM OR SERVICE: HCPCS | Mod: GY | Performed by: HOSPITALIST

## 2017-10-12 PROCEDURE — 25000132 ZZH RX MED GY IP 250 OP 250 PS 637: Mod: GY | Performed by: INTERNAL MEDICINE

## 2017-10-12 PROCEDURE — A9270 NON-COVERED ITEM OR SERVICE: HCPCS | Mod: GY | Performed by: INTERNAL MEDICINE

## 2017-10-12 PROCEDURE — 25000132 ZZH RX MED GY IP 250 OP 250 PS 637: Mod: GY | Performed by: HOSPITALIST

## 2017-10-12 PROCEDURE — 97530 THERAPEUTIC ACTIVITIES: CPT | Mod: GO | Performed by: OCCUPATIONAL THERAPY ASSISTANT

## 2017-10-12 PROCEDURE — 00000146 ZZHCL STATISTIC GLUCOSE BY METER IP

## 2017-10-12 PROCEDURE — 25500064 ZZH RX 255 OP 636: Performed by: HOSPITALIST

## 2017-10-12 PROCEDURE — 40000193 ZZH STATISTIC PT WARD VISIT

## 2017-10-12 PROCEDURE — 90662 IIV NO PRSV INCREASED AG IM: CPT | Performed by: HOSPITALIST

## 2017-10-12 PROCEDURE — 25000128 H RX IP 250 OP 636: Performed by: HOSPITALIST

## 2017-10-12 PROCEDURE — 97535 SELF CARE MNGMENT TRAINING: CPT | Mod: GO | Performed by: OCCUPATIONAL THERAPY ASSISTANT

## 2017-10-12 PROCEDURE — 40000264 ECHO COMPLETE WITH LUMASON

## 2017-10-12 PROCEDURE — 85025 COMPLETE CBC W/AUTO DIFF WBC: CPT | Performed by: HOSPITALIST

## 2017-10-12 PROCEDURE — 97530 THERAPEUTIC ACTIVITIES: CPT | Mod: GP

## 2017-10-12 PROCEDURE — 12000007 ZZH R&B INTERMEDIATE

## 2017-10-12 PROCEDURE — 80048 BASIC METABOLIC PNL TOTAL CA: CPT | Performed by: HOSPITALIST

## 2017-10-12 PROCEDURE — 99232 SBSQ HOSP IP/OBS MODERATE 35: CPT | Performed by: INTERNAL MEDICINE

## 2017-10-12 PROCEDURE — 93306 TTE W/DOPPLER COMPLETE: CPT | Mod: 26 | Performed by: INTERNAL MEDICINE

## 2017-10-12 PROCEDURE — 83036 HEMOGLOBIN GLYCOSYLATED A1C: CPT | Performed by: HOSPITALIST

## 2017-10-12 PROCEDURE — 25000125 ZZHC RX 250: Performed by: HOSPITALIST

## 2017-10-12 PROCEDURE — 97116 GAIT TRAINING THERAPY: CPT | Mod: GP

## 2017-10-12 PROCEDURE — 25000131 ZZH RX MED GY IP 250 OP 636 PS 637: Mod: GY | Performed by: HOSPITALIST

## 2017-10-12 PROCEDURE — 40000133 ZZH STATISTIC OT WARD VISIT: Performed by: OCCUPATIONAL THERAPY ASSISTANT

## 2017-10-12 RX ORDER — DEXTROSE MONOHYDRATE 25 G/50ML
25-50 INJECTION, SOLUTION INTRAVENOUS
Status: DISCONTINUED | OUTPATIENT
Start: 2017-10-12 | End: 2017-10-14 | Stop reason: HOSPADM

## 2017-10-12 RX ORDER — NICOTINE POLACRILEX 4 MG
15-30 LOZENGE BUCCAL
Status: DISCONTINUED | OUTPATIENT
Start: 2017-10-12 | End: 2017-10-14 | Stop reason: HOSPADM

## 2017-10-12 RX ADMIN — INFLUENZA A VIRUSA/MICHIGAN/45/2015 X-275 (H1N1) ANTIGEN (FORMALDEHYDE INACTIVATED), INFLUENZA A VIRUS A/HONG KONG/4801/2014 X-263B (H3N2) ANTIGEN (FORMALDEHYDE INACTIVATED), AND INFLUENZA B VIRUS B/BRISBANE/60/2008 ANTIGEN (FORMALDEHYDE INACTIVATED) 0.5 ML: 60; 60; 60 INJECTION, SUSPENSION INTRAMUSCULAR at 14:16

## 2017-10-12 RX ADMIN — LINAGLIPTIN 2.5 MG: 5 TABLET, FILM COATED ORAL at 19:12

## 2017-10-12 RX ADMIN — ALLOPURINOL 100 MG: 100 TABLET ORAL at 09:42

## 2017-10-12 RX ADMIN — VITAMIN D, TAB 1000IU (100/BT) 2000 UNITS: 25 TAB at 19:13

## 2017-10-12 RX ADMIN — SODIUM CHLORIDE: 9 INJECTION, SOLUTION INTRAVENOUS at 04:31

## 2017-10-12 RX ADMIN — CARBOXYMETHYLCELLULOSE SODIUM 1 DROP: 5 SOLUTION/ DROPS OPHTHALMIC at 20:59

## 2017-10-12 RX ADMIN — SODIUM CHLORIDE: 9 INJECTION, SOLUTION INTRAVENOUS at 14:27

## 2017-10-12 RX ADMIN — OMEPRAZOLE 20 MG: 20 CAPSULE, DELAYED RELEASE ORAL at 14:13

## 2017-10-12 RX ADMIN — FLUOXETINE 20 MG: 20 CAPSULE ORAL at 20:59

## 2017-10-12 RX ADMIN — MULTIPLE VITAMINS W/ MINERALS TAB 1 TABLET: TAB at 14:13

## 2017-10-12 RX ADMIN — SULFUR HEXAFLUORIDE 5 ML: KIT at 12:30

## 2017-10-12 RX ADMIN — CYANOCOBALAMIN TAB 1000 MCG 1000 MCG: 1000 TAB at 14:27

## 2017-10-12 RX ADMIN — BRIMONIDINE TARTRATE 1 DROP: 2 SOLUTION/ DROPS OPHTHALMIC at 09:41

## 2017-10-12 RX ADMIN — ASPIRIN 81 MG: 81 TABLET, COATED ORAL at 19:13

## 2017-10-12 RX ADMIN — LISINOPRIL 15 MG: 10 TABLET ORAL at 18:25

## 2017-10-12 RX ADMIN — Medication 1 TABLET: at 17:15

## 2017-10-12 RX ADMIN — ROSUVASTATIN CALCIUM 20 MG: 20 TABLET, FILM COATED ORAL at 19:13

## 2017-10-12 RX ADMIN — INSULIN GLARGINE 5 UNITS: 100 INJECTION, SOLUTION SUBCUTANEOUS at 09:38

## 2017-10-12 RX ADMIN — Medication 1 TABLET: at 09:41

## 2017-10-12 RX ADMIN — FLUOXETINE 20 MG: 20 CAPSULE ORAL at 09:41

## 2017-10-12 RX ADMIN — CARBOXYMETHYLCELLULOSE SODIUM 1 DROP: 5 SOLUTION/ DROPS OPHTHALMIC at 09:41

## 2017-10-12 NOTE — PLAN OF CARE
Problem: Patient Care Overview  Goal: Plan of Care/Patient Progress Review  Outcome: Improving  A&Ox4 but forgetful. Pleasant and cooperative.  VSS except tachycardic at times.  Afebrile. Tele-SR with 1st Degree AV Block.  CMS intact.  RUE splinted.  On IVF.  Up with 2, GB & Walker to bathroom.  Voiding well.  Passing gas.  Tolerating Mod CHO diet.  BG-154 & 207.  Had Echo done.  Denies any pain/discomfort.  Was up in chair.  Ambulated to bathroom.  Daughter at bedside.

## 2017-10-12 NOTE — PROGRESS NOTES
Care Transition Initial Assessment - LYRIC  Reason For Consult: discharge planning  Met with: Patient and Family    Active Problems:    Fall         DATA  Lives With: alone  Living Arrangements: condominium    Identified issues/concerns regarding health management: LYRIC was consulted for discharge planning. Patient is Eri, an 89 year-old female who was admitted on 10/11 after a fall. Her tentative discharge date is 10/12 or 10/13. SW met with patient and her daughter, and reviewed medical record. Per physical therapy and physician notes, the recommendation is TCU. Initially, patient and her daughter were on board with this plan, and SW put in referrals for [facility] and [facility]. SW then noted that patient will likely not get a 3-day stay. Patient declined to private pay for TCU, choosing homecare instead. She said she also has services through her senior living, including a grocery service. SW also talked to patient's daughter, who expressed concern about patient not having 24-hour care. SW explained how private pay would work. Patient's daughter asked if patient could stay to get a 3-day stay. SW explained it is the doctor who decides when she is ready to discharge, and that she would need a medical reason to need to stay.               Transportation Available: family or friend will provide (pt does not drive at baseline )    ASSESSMENT  Cognitive Status:  awake, alert and oriented  Concerns to be addressed: Discharge planning.     PLAN  Financial costs for the patient includes possible private-pay for TCU.  Patient given options and choices for discharge Yes.  Patient/family is agreeable to the plan? Patient is agreeable to going home; patient's daughter has concerns.  Patient Goals and Preferences: Discharge to home with assist.  Patient anticipates discharging to: home with assist.    Zuleima Ramos, MSW, LGSW

## 2017-10-12 NOTE — DISCHARGE INSTRUCTIONS
Follow Up with Dr. Aceves in his office on Tuesday October 17, 2017 for splint removal.  Akiko from Dr. Aceves's office will be calling you to schedule this. You can reach her at [phone].

## 2017-10-12 NOTE — PROVIDER NOTIFICATION
Spoke with Dr. Aceves via phone.  Pt ok to discharge from his standpoint.  Pt should see him in his clinic next week Tuesday October 17, 2017 for splint removal.  Rhonda-Care Coordinator to follow up on splint removal appt arrangement.

## 2017-10-12 NOTE — PLAN OF CARE
"Problem: Patient Care Overview  Goal: Plan of Care/Patient Progress Review  Discharge Planner PT   Patient plan for discharge: \"I would like to go home.\"  Current status: Pt requires min assist for bed mobility, good sitting balance at EOB. Pt performs sit to/from stand and toilet transfer with min assist, requires assist to manage briefs. Pt tolerates ambulating 50' with platform FWW with CGA.   Barriers to return to prior living situation: Lives alone, decreased activity tolerance, level of assist, fall history, fall risk.  Recommendations for discharge: TCU  Rationale for recommendations: Pt will benefit from daily continued therapy to address impairments and increase mobility and functional independence prior to returning home.        Entered by: Consuelo Araujo 10/12/2017 11:11 AM             "

## 2017-10-12 NOTE — PROGRESS NOTES
[facility]    Hospitalist Progress Note  Lester Jacob MD    Assessment & Plan       89-year-old woman with PmHx of mild intermittent asthma, Type 2 DM, Peripheral Neuropathy, DJD, GERD, Dysthymic disorder, lymphoma in remission, who was admitted on 10/11/17, due to mechanical fall with resultant right hip and right wrist pain.  Work up done on 10/11/17 revealed, CMP significant for creat 1.2, Alb 3.1. CBC revealed, Plts 135. Lipase 131, initial troponin is negative. Pelvis and right hip x-rays on 10/11/17 was negative. Right wrist on 10/11/17 revealed, subtle linear transverse band of relatively increased density within the distal right radial metaphysis. A minimally impacted acute fracture cannot be excluded. Normal alignment of the right wrist. Diffuse osteopenia. Severe degenerative changes in the right first carpometacarpal joint. CT Head on 10/11/17 did not show any acute intracranial abnormality. CT Right hip on 10/11/17 did not show any visualized acute fracture or malalignment of the pelvis or right hip. Large hematoma in the subcutaneous tissues at the lateral aspect of the right hip.      1.  Suspected right radial wrist fracture post mechanical fall: Appreciate Orthopedic Surgery input. Pt has a splint in applied. She reported having pain, with movt of her right upper extremity. For tylenol prn. For IV dilaudid prn.  ECHO done on 10/12/17 revealed, V is normal in size. LVEF is estimated at 65-70%. Grade I or early diastolic dysfunction. No regional wall motion abnormalities noted. There is no thrombus seen in the left ventricle. RV size and function are normal. Trace mild MR and TR. Trace AR.  Moderate valvular aortic stenosis. The calculated aortic valve are is 1.1 cm2.  No pericardial effusion.    2. Large hematoma on lateral aspect of the right hip: due to mechanical fall. Pt reported having pain in her right hip right thigh and right knee with movt. Ambulate as  tolerated.    3.  Hypertension: controlled. continue lisinopril, with holding parameters.      4.  Hyperlipidemia: Continue crestor.     5.  Type 2 Diabetes mellitus: recommence linagliptin. Continue medium dose insulin aspart sliding scale prn.     6.  Dysthymia: continue fluoxetine.     7.  Mild thrombocytopenia: unknown baseline. Plts was 135-->127 on 10/12/17. Aspirin is on hold. Monitor CBC.    8.  Gout: continue allopurinol.     9.  Mild intermittent asthma: for albuterol nebs prn.     10.  CKD, stage 3: Creat was 1.16 on 10/12/17. Monitor BMP.    11.  Gastroesophageal reflux  prophylaxis: continue omeprazole.       DVT Prophylaxis: Pneumatic Compression Devices  Code Status: Full Code    Disposition: Expected discharge to TCU in 1-2 days.      Interval History   The pt reported having pain in her right hip, right thigh, right knee and right upper extremity with movements. She lives alone and does not feel that she can cope alone at home. Her daughter is unable to provide 24hrs assistance to her.     -Data reviewed today: I reviewed all new labs and imaging results over the last 24 hours. I personally reviewed no images or EKG's today.    Physical Exam   Temp: 98.4  F (36.9  C) Temp src: Oral BP: 107/55 Pulse: 106   Resp: 16 SpO2: 95 % O2 Device: None (Room air) Oxygen Delivery: 3 LPM  Vitals:    10/11/17 0022 10/12/17 0551   Weight: 72.6 kg (160 lb) 77.1 kg (170 lb)     Vital Signs with Ranges  Temp:  [98.2  F (36.8  C)-98.6  F (37  C)] 98.4  F (36.9  C)  Pulse:  [] 106  Resp:  [16-18] 16  BP: (107-168)/(55-75) 107/55  SpO2:  [92 %-96 %] 95 %  I/O last 3 completed shifts:  In: 2897 [P.O.:720; I.V.:2177]  Out: 250 [Urine:250]    Constitutional: Elderly white female, awake, cooperative, no apparent distress, O2 Sats was 96% on RA  Respiratory: BS vesicular bilaterally, no crackles or wheezing  Cardiovascular: S1 and S2, reg, no murmur noted  GI: Soft, non-distended, non-tender, no masses, BS  present+  Skin/Integumen: Ecchymosis of right thigh  Extremities: Swollen Right thigh, tenderness on palpation over lateral aspect of right thigh. Right forearm splint in situ+    Medications     NaCl 100 mL/hr at 10/12/17 0431       insulin glargine  5 Units Subcutaneous QAM AC     influenza Vac Split High-Dose  0.5 mL Intramuscular Prior to discharge     allopurinol (ZYLOPRIM) tablet 100 mg  100 mg Oral Daily     aspirin EC EC tablet 81 mg  81 mg Oral QPM     brimonidine  1 drop Left Eye BID     calcium-vitamin D  1 tablet Oral BID w/meals     carboxymethylcellulose  1 drop Left Eye BID     cholecalciferol (vitamin D) tablet 2,000 Units  2,000 Units Oral QPM     cyanocobalamin  1,000 mcg Oral Daily with lunch     FLUoxetine (PROzac) capsule 20 mg  20 mg Oral BID     omeprazole (priLOSEC) CR capsule 20 mg  20 mg Oral Daily with lunch     multivitamin, therapeutic with minerals  1 tablet Oral Daily with lunch     rosuvastatin (CRESTOR) tablet 20 mg  20 mg Oral QPM     insulin aspart  1-12 Units Subcutaneous Q4H       Data     Recent Labs  Lab 10/12/17  0655 10/11/17  2345 10/11/17  1505 10/11/17  0940 10/11/17  0030   WBC 6.6  --   --   --  4.9   HGB 7.7* 8.4* 9.1* 9.5* 12.6   MCV 87  --   --   --  87   *  --   --   --  135*     --   --  139 141   POTASSIUM 3.8  --   --  4.6 3.8   CHLORIDE 107  --   --  106 104   CO2 27  --   --  25 29   BUN 20  --   --  25 21   CR 1.16*  --   --  1.19* 1.20*   ANIONGAP 7  --   --  8 8   GARRETT 7.4*  --   --  7.6* 7.9*   *  --   --  330* 148*   ALBUMIN  --   --   --   --  3.1*   PROTTOTAL  --   --   --   --  6.0*   BILITOTAL  --   --   --   --  0.4   ALKPHOS  --   --   --   --  49   ALT  --   --   --   --  21   AST  --   --   --   --  22   LIPASE  --   --   --   --  131   TROPI  --   --   --   --  <0.015       No results found for this or any previous visit (from the past 24 hour(s)).

## 2017-10-12 NOTE — PROGRESS NOTES
LYRIC    D: Dr. Jacob expressed concern about patient returning home without 24-hour care. LYRIC and CC met with patient and her daughter to discuss applying for MA (which would cover TCU). Patient's daughter was given the application. LYRIC contacted facilities to see who accepts MA; [facility] and [facility] do not. [facility] does. LYRIC also put in a referral for [facility], who accepts MA pending.     P: Continue to follow.

## 2017-10-12 NOTE — PLAN OF CARE
Problem: Patient Care Overview  Goal: Plan of Care/Patient Progress Review  Outcome: No Change  Pt is alert, disoriented to time. Forgetful at times. VSS with Sinus rhythm on tele. CMS intact with 2+ edema on RUE. Denied pain overnight. Assist x2/GB/walker. Incontinent at times. Will continue to monitor.

## 2017-10-12 NOTE — PROGRESS NOTES
[facility]    Orthopedic Surgery Follow Up Note    ASSESSMENT:  Right distal radius impacted fracture nondisplaced  Right subcutaneous Hematoma    PLAN:  Probable discharge to TCU  Plan to switch to a removable splint and I see her in the office next week  Weightbearing as tolerated up ambulating with assistance  I will sign off    Dino Aceves MD      Interval History   Doing better.  His splint was tight around her thumb.  That was released yesterday.  Hematoma seems softer today less tender, more ecchymosis.    Physical Exam   Temp: 97.9  F (36.6  C) Temp src: Oral BP: 142/55 Pulse: 82   Resp: 16 SpO2: 96 % O2 Device: None (Room air)    Vitals:    10/11/17 0022 10/12/17 0551   Weight: 72.6 kg (160 lb) 77.1 kg (170 lb)     Vital Signs with Ranges  Temp:  [97.9  F (36.6  C)-98.6  F (37  C)] 97.9  F (36.6  C)  Pulse:  [] 82  Resp:  [16-18] 16  BP: (107-168)/(55-75) 142/55  SpO2:  [92 %-96 %] 96 %  I/O last 3 completed shifts:  In: 2752 [P.O.:575; I.V.:2177]  Out: 250 [Urine:250]    Alert, Oriented  Abd: S,NT,ND  Splint is fitting adequately and right forearm and right thigh mild tenderness, more ecchymosis, tissue is less firm.    Medications     NaCl 100 mL/hr at 10/12/17 1427        linagliptin  2.5 mg Oral Daily     lisinopril (PRINIVIL/ZESTRIL) tablet 15 mg  15 mg Oral Daily     insulin aspart  1-7 Units Subcutaneous TID AC     insulin aspart  1-5 Units Subcutaneous At Bedtime     allopurinol (ZYLOPRIM) tablet 100 mg  100 mg Oral Daily     aspirin EC EC tablet 81 mg  81 mg Oral QPM     brimonidine  1 drop Left Eye BID     calcium-vitamin D  1 tablet Oral BID w/meals     carboxymethylcellulose  1 drop Left Eye BID     cholecalciferol (vitamin D) tablet 2,000 Units  2,000 Units Oral QPM     cyanocobalamin  1,000 mcg Oral Daily with lunch     FLUoxetine (PROzac) capsule 20 mg  20 mg Oral BID     omeprazole (priLOSEC) CR capsule 20 mg  20 mg Oral Daily with lunch     multivitamin,  therapeutic with minerals  1 tablet Oral Daily with lunch     rosuvastatin (CRESTOR) tablet 20 mg  20 mg Oral QPM       Data     Recent Labs  Lab 10/12/17  0655 10/11/17  2345 10/11/17  1505 10/11/17  0940 10/11/17  0030   HGB 7.7* 8.4* 9.1* 9.5* 12.6       No results found for this or any previous visit (from the past 24 hour(s)).

## 2017-10-12 NOTE — PLAN OF CARE
Problem: Patient Care Overview  Goal: Plan of Care/Patient Progress Review  Outcome: No Change  Patient up with assist of 2; patient not tolerating activity well, nausea/vomiting/dry heaving present, and complaint of dizziness.  Pain managed with Tylenol.  VSS on RA.  Disoriented to time and situation; confused and forgetful at times.  CMS intact in RUE; splint and dressing in place.  Incontinent of urine. Tele SR with 1st degree AV block.  Blood sugars 231 and 184.  Patient ate nothing this pm, diaphoretic at times.  Echo to be completed tomorrow.

## 2017-10-12 NOTE — PLAN OF CARE
Problem: Patient Care Overview  Goal: Plan of Care/Patient Progress Review  Discharge Planner OT   Patient plan for discharge: home  Current status: Pt completes supine to sit EOB SBA, Trice sit to stand, amb with platform walker to/from bathroom CGA, toilet transfer Trice, min/mod A clothing management as not able to reach fully around to right side. Assist needed for setup of lunch tray. Pt with soft foods so able to cut food and eat with Left nondominant hand.   Barriers to return to prior living situation: Lives alone, R hand dominant, strength, balance, pain, endurance   Recommendations for discharge: TCU per plan established by the Occupational Therapist  Rationale for recommendations: pt would benefit from daily therapy to return to PLOF prior to returning home alone       Entered by: Asha Figueroa 10/12/2017 2:04 PM

## 2017-10-13 ENCOUNTER — APPOINTMENT (OUTPATIENT)
Dept: OCCUPATIONAL THERAPY | Facility: CLINIC | Age: 82
DRG: 605 | End: 2017-10-13
Payer: MEDICARE

## 2017-10-13 LAB
ABO + RH BLD: NORMAL
ABO + RH BLD: NORMAL
BLD GP AB SCN SERPL QL: NORMAL
BLD PROD TYP BPU: NORMAL
BLD PROD TYP BPU: NORMAL
BLD UNIT ID BPU: 0
BLOOD BANK CMNT PATIENT-IMP: NORMAL
BLOOD PRODUCT CODE: NORMAL
BPU ID: NORMAL
GLUCOSE BLDC GLUCOMTR-MCNC: 130 MG/DL (ref 70–99)
GLUCOSE BLDC GLUCOMTR-MCNC: 133 MG/DL (ref 70–99)
GLUCOSE BLDC GLUCOMTR-MCNC: 134 MG/DL (ref 70–99)
GLUCOSE BLDC GLUCOMTR-MCNC: 152 MG/DL (ref 70–99)
GLUCOSE BLDC GLUCOMTR-MCNC: 158 MG/DL (ref 70–99)
HGB BLD-MCNC: 6.4 G/DL (ref 11.7–15.7)
HGB BLD-MCNC: 7.6 G/DL (ref 11.7–15.7)
NUM BPU REQUESTED: 1
SPECIMEN EXP DATE BLD: NORMAL
TRANSFUSION STATUS PATIENT QL: NORMAL
TRANSFUSION STATUS PATIENT QL: NORMAL

## 2017-10-13 PROCEDURE — 25000132 ZZH RX MED GY IP 250 OP 250 PS 637: Mod: GY | Performed by: HOSPITALIST

## 2017-10-13 PROCEDURE — 85018 HEMOGLOBIN: CPT | Performed by: INTERNAL MEDICINE

## 2017-10-13 PROCEDURE — A9270 NON-COVERED ITEM OR SERVICE: HCPCS | Mod: GY | Performed by: INTERNAL MEDICINE

## 2017-10-13 PROCEDURE — 25000132 ZZH RX MED GY IP 250 OP 250 PS 637: Mod: GY | Performed by: INTERNAL MEDICINE

## 2017-10-13 PROCEDURE — 99233 SBSQ HOSP IP/OBS HIGH 50: CPT | Performed by: INTERNAL MEDICINE

## 2017-10-13 PROCEDURE — 97535 SELF CARE MNGMENT TRAINING: CPT | Mod: GO | Performed by: OCCUPATIONAL THERAPIST

## 2017-10-13 PROCEDURE — A9270 NON-COVERED ITEM OR SERVICE: HCPCS | Mod: GY | Performed by: HOSPITALIST

## 2017-10-13 PROCEDURE — 36415 COLL VENOUS BLD VENIPUNCTURE: CPT | Performed by: INTERNAL MEDICINE

## 2017-10-13 PROCEDURE — P9016 RBC LEUKOCYTES REDUCED: HCPCS | Performed by: HOSPITALIST

## 2017-10-13 PROCEDURE — 12000007 ZZH R&B INTERMEDIATE

## 2017-10-13 PROCEDURE — 25000128 H RX IP 250 OP 636: Performed by: HOSPITALIST

## 2017-10-13 PROCEDURE — 00000146 ZZHCL STATISTIC GLUCOSE BY METER IP

## 2017-10-13 PROCEDURE — 40000133 ZZH STATISTIC OT WARD VISIT: Performed by: OCCUPATIONAL THERAPIST

## 2017-10-13 RX ORDER — HYDRALAZINE HYDROCHLORIDE 20 MG/ML
10 INJECTION INTRAMUSCULAR; INTRAVENOUS EVERY 4 HOURS PRN
Status: DISCONTINUED | OUTPATIENT
Start: 2017-10-13 | End: 2017-10-14 | Stop reason: HOSPADM

## 2017-10-13 RX ADMIN — CARBOXYMETHYLCELLULOSE SODIUM 1 DROP: 5 SOLUTION/ DROPS OPHTHALMIC at 09:05

## 2017-10-13 RX ADMIN — SODIUM CHLORIDE: 9 INJECTION, SOLUTION INTRAVENOUS at 00:02

## 2017-10-13 RX ADMIN — OMEPRAZOLE 20 MG: 20 CAPSULE, DELAYED RELEASE ORAL at 12:26

## 2017-10-13 RX ADMIN — VITAMIN D, TAB 1000IU (100/BT) 2000 UNITS: 25 TAB at 19:52

## 2017-10-13 RX ADMIN — Medication 1 TABLET: at 17:37

## 2017-10-13 RX ADMIN — ROSUVASTATIN CALCIUM 20 MG: 20 TABLET, FILM COATED ORAL at 20:00

## 2017-10-13 RX ADMIN — BRIMONIDINE TARTRATE 1 DROP: 2 SOLUTION/ DROPS OPHTHALMIC at 21:34

## 2017-10-13 RX ADMIN — ACETAMINOPHEN 650 MG: 325 TABLET, FILM COATED ORAL at 12:25

## 2017-10-13 RX ADMIN — FLUOXETINE 20 MG: 20 CAPSULE ORAL at 21:35

## 2017-10-13 RX ADMIN — CYANOCOBALAMIN TAB 1000 MCG 1000 MCG: 1000 TAB at 12:26

## 2017-10-13 RX ADMIN — CARBOXYMETHYLCELLULOSE SODIUM 1 DROP: 5 SOLUTION/ DROPS OPHTHALMIC at 21:35

## 2017-10-13 RX ADMIN — BRIMONIDINE TARTRATE 1 DROP: 2 SOLUTION/ DROPS OPHTHALMIC at 09:05

## 2017-10-13 RX ADMIN — Medication 1 TABLET: at 09:05

## 2017-10-13 RX ADMIN — ALLOPURINOL 100 MG: 100 TABLET ORAL at 09:05

## 2017-10-13 RX ADMIN — MULTIPLE VITAMINS W/ MINERALS TAB 1 TABLET: TAB at 12:26

## 2017-10-13 RX ADMIN — LINAGLIPTIN 2.5 MG: 5 TABLET, FILM COATED ORAL at 12:26

## 2017-10-13 RX ADMIN — FLUOXETINE 20 MG: 20 CAPSULE ORAL at 09:05

## 2017-10-13 RX ADMIN — SODIUM CHLORIDE: 9 INJECTION, SOLUTION INTRAVENOUS at 09:46

## 2017-10-13 NOTE — PROVIDER NOTIFICATION
Dr Huitron was notified of pt critical hgb value of 6.4. MD ordered 1 unit of blood. Will be seeing pt shortly. Will continue to monitor.

## 2017-10-13 NOTE — PLAN OF CARE
Problem: Patient Care Overview  Goal: Plan of Care/Patient Progress Review  Outcome: Improving  Pt A/O, VSS on RA. On Tele Sinus with 1st Degree Block. MOD CHO diet. Up with assist of 1 and gait belt but needs two to get back to bed. Uses bedside commode.

## 2017-10-13 NOTE — PROVIDER NOTIFICATION
Page out to Dr. Huitron in regards to discharge planning. Await return call. Rhonda Hernandez RN  Addendum: Dr. Huitron will evaluate patient for discharge shortly. Await visit. hRonda Hernandez RN    Addendum: Left message with Dr. Aceves's care coordinator to have her get scheduled for  Splint removal on Tuesday per Dr. Aceves's instructions. Informaiton in discharge instructions. Will follow. Rhonda Hernandez RN  Addendum: See Dr. Pace's note, appreciate assistance.  Patient and daughter plan to discharge to TCU when medically cleared, antic tomorrow.   Spoke to daughter Blanca, and she understands that her mother will need a formal exercise program to retain any progress made at TCU, information given to daughter of programs in [facility].  Will follow. Rhonda Hernandez RN

## 2017-10-13 NOTE — PROGRESS NOTES
LYRIC    D: Patient required a unit of blood due to low hemoglobin, so she will not be discharged today. [facility] able to accept her over the weekend when she is discharged. Patient and family are in agreement with this plan.     P: Continue to follow.    PAS-RR    D: Per DHS regulation, SW completed and submitted PAS-RR to [place] [employer] Direct Connect via the Senior LinkAge Line.  PAS-RR confirmation # is : [id number]    I: SW spoke with patient and family and they are aware a PAS-RR has been submitted.  LYRIC reviewed with patient and family that they may be contacted for a follow up appointment within 10 days of hospital discharge if their SNF stay is < 30 days.  Contact information for [facility] was also provided.    A: patient and family verbalized understanding.    P: Further questions may be directed to [facility] LinkAge Line at #[phone], option #4 for PAS-RR staff.

## 2017-10-13 NOTE — PROGRESS NOTES
[facility]    Hospitalist Progress Note    Date of Service (when I saw the patient): 10/13/2017    Assessment & Plan   Eri Puckett is a 89 year old female with PmHx of mild intermittent asthma, Type 2 DM, Peripheral Neuropathy, DJD, GERD, Dysthymic disorder, lymphoma in remission, who was admitted on 10/11/17, due to mechanical fall with resultant right hip and right wrist pain.       Acute right distal radial fracture- nondisplaced   Right hip pain  Mechanical fall  Suffered as a result of a mechanical fall.  Pelvis and right hip x-rays on 10/11/17 was negative. Right wrist on 10/11/17 revealed, subtle linear transverse band of relatively increased density within the distal right radial metaphysis. A minimally impacted acute fracture cannot be excluded. Normal alignment of the right wrist. Diffuse osteopenia. Severe degenerative changes in the right first carpometacarpal joint. CT Right hip on 10/11/17 did not show any visualized acute fracture or malalignment of the pelvis or right hip but did show a large hematoma in the subcutaneous tissues at the lateral aspect of the right hip as below.  ECHO done on 10/12/17 revealed, V is normal in size. LVEF is estimated at 65-70%. Grade I or early diastolic dysfunction. No regional wall motion abnormalities noted. There is no thrombus seen in the left ventricle. RV size and function are normal. Trace mild MR and TR. Trace AR.  Moderate valvular aortic stenosis. The calculated aortic valve are is 1.1 cm2.   - Appreciate Orthopedic Surgery input.  A splint has been applied.    - Pain controlled.    - Will follow up with Ortho in clinic next week to have a removable splint applied.    - WBAT.    - Will be going to TCU on D/c.     Large hematoma on lateral aspect of the right hip, pelvis and flank with associated acute blood loss anemia  As a result of her mechanical fall. Pt is on Asa PTA. This has been held as her Hgb has trended down to 6.4 from 12.6  "since admission. Pt reports having pain in her right hip right thigh and right knee with movt.      - Will transfuse 1 unit PRBC today and follow her Hgb post transfusion.    - Consent obtained and in the chart.    - conditional orders placed for Hgb < 7.    - Will need to follow for progression or any neurological signs in the leg.  None at present.     Hypertension:    - PTA is on lisinopril 15 mg daily.    - In the setting of her bleeding I will hold this for now as of 10/13.  Pressures a little low this am.         Type 2 Diabetes mellitus  PTA linagliptin continued. A1c 6.8.     - Continue medium dose insulin aspart sliding scale prn.     Mild thrombocytopenia  Plts was 135-->127 on 10/12/17. Most consistent with her above bleeding.    - Aspirin is on hold. Monitor CBC.     CKD, stage 3:   Creat 1.2 on admit and was 1.16 on 10/12/17.    - Monitor BMP.     DVT Prophylaxis: Pneumatic Compression Devices  Code Status: Full Code     Disposition: Pending improvement in her HGB with stability.  Plan is for TCU when ready.       Ceferino Huitron       Interval History   Hgb noted to be 6.4 today and related to her large hematoma.   Feels weak and tired and says I've lost my pep\". No lightheadedness, cp or sob. Pain controlled. Moving her bowl and tolerating diet.       -Data reviewed today: I reviewed all new labs and imaging results over the last 24 hours. I personally reviewed no images or EKG's today.    Physical Exam   Temp: 98.9  F (37.2  C) Temp src: Oral BP: 96/40 Pulse: 87   Resp: 16 SpO2: 94 % O2 Device: None (Room air)    Vitals:    10/11/17 0022 10/12/17 0551 10/13/17 0656   Weight: 72.6 kg (160 lb) 77.1 kg (170 lb) 80.7 kg (178 lb)     Vital Signs with Ranges  Temp:  [97.9  F (36.6  C)-100.6  F (38.1  C)] 98.9  F (37.2  C)  Pulse:  [81-87] 87  Resp:  [16] 16  BP: ()/(40-58) 96/40  SpO2:  [93 %-96 %] 94 %  I/O last 3 completed shifts:  In: 315 [P.O.:315]  Out: 450 [Urine:450]    Gen: Patient in " no acute distress.  Appears comfortable.  Neuro: CN II-XII intact with no focal deficits or loss of sensation.   Heart:  S1S2+, regular rate and rhythm, No murmurs.  Lungs:  Clear to auscultation, no wheezing, no rales.   Abdomen:  Soft, non tender, non distended, bowel sounds positive.  Extremities: Trace LE edema. Large area of bruising from her right flank down to her mid thigh and just above the knee associated with some swelling.     Medications     NaCl 100 mL/hr at 10/13/17 0946       linagliptin  2.5 mg Oral Daily     lisinopril (PRINIVIL/ZESTRIL) tablet 15 mg  15 mg Oral Daily     insulin aspart  1-7 Units Subcutaneous TID AC     insulin aspart  1-5 Units Subcutaneous At Bedtime     allopurinol (ZYLOPRIM) tablet 100 mg  100 mg Oral Daily     brimonidine  1 drop Left Eye BID     calcium-vitamin D  1 tablet Oral BID w/meals     carboxymethylcellulose  1 drop Left Eye BID     cholecalciferol (vitamin D) tablet 2,000 Units  2,000 Units Oral QPM     cyanocobalamin  1,000 mcg Oral Daily with lunch     FLUoxetine (PROzac) capsule 20 mg  20 mg Oral BID     omeprazole (priLOSEC) CR capsule 20 mg  20 mg Oral Daily with lunch     multivitamin, therapeutic with minerals  1 tablet Oral Daily with lunch     rosuvastatin (CRESTOR) tablet 20 mg  20 mg Oral QPM       Data     Recent Labs  Lab 10/13/17  1150 10/12/17  0655 10/11/17  2345  10/11/17  0940 10/11/17  0030   WBC  --  6.6  --   --   --  4.9   HGB 6.4* 7.7* 8.4*  < > 9.5* 12.6   MCV  --  87  --   --   --  87   PLT  --  127*  --   --   --  135*   NA  --  141  --   --  139 141   POTASSIUM  --  3.8  --   --  4.6 3.8   CHLORIDE  --  107  --   --  106 104   CO2  --  27  --   --  25 29   BUN  --  20  --   --  25 21   CR  --  1.16*  --   --  1.19* 1.20*   ANIONGAP  --  7  --   --  8 8   GARRETT  --  7.4*  --   --  7.6* 7.9*   GLC  --  142*  --   --  330* 148*   ALBUMIN  --   --   --   --   --  3.1*   PROTTOTAL  --   --   --   --   --  6.0*   BILITOTAL  --   --   --   --   --   0.4   ALKPHOS  --   --   --   --   --  49   ALT  --   --   --   --   --  21   AST  --   --   --   --   --  22   LIPASE  --   --   --   --   --  131   TROPI  --   --   --   --   --  <0.015   < > = values in this interval not displayed.    No results found for this or any previous visit (from the past 24 hour(s)).

## 2017-10-13 NOTE — PROGRESS NOTES
Blood transfusion began at 1337. Unit number of blood double checked and matched to unit number in results review.

## 2017-10-13 NOTE — PLAN OF CARE
Problem: Patient Care Overview  Goal: Plan of Care/Patient Progress Review  PT: Attempted to see patient for PT session but patient had just been up with nursing staff and just now returned to bed. Agreeable to try later if possible; Possible discharge today to TCU per chart and Care coordinator.

## 2017-10-13 NOTE — PLAN OF CARE
Problem: Patient Care Overview  Goal: Plan of Care/Patient Progress Review  Outcome: Improving  A/Ox4 but forgetful at times. VSS on Ra. Hematoma to R hip no change. Cast to R arm c/d/i. No c/o pain. Up with 1 and walker to BSC. Regular diet. Possible d/c to TCU today.

## 2017-10-13 NOTE — PROGRESS NOTES
A&Ox4; forgetful at times. VSS on RA. Up with A1, walker and gait belt to [facility]. Tele SR with 1st degree AV block. Tolerating regular diet. Received 1 unit blood d/t hgb 6.4; hgb recheck at 1800 was 7.6. Hgb checks q6hrs. R hand in cast; mild edema to right hand. CMS intact. Large hematoma to right hip. Pt reports minimal pain; treated with prn tylenol. IV infusing. Discharge to TCU pending. Continue to monitor.

## 2017-10-13 NOTE — PLAN OF CARE
"Problem: Patient Care Overview  Goal: Plan of Care/Patient Progress Review     Discharge Planner OT   Patient plan for discharge: \"I want to go home.\" --Pt.lacks insight into current impairments.  Current status: Pt.lacks insight into current impairments(unaware of fracture), forgetful. Pt.overall min.A w/supine-sit, sit-stand, vc's need for hand placement. Pt. Ambulated to/from bathroom w/CGA-min. A, needed vc's for safely sequencing w/walker;completed toileting task w/mod-max. A, toilet transfer w/min. A. Encouraged/ED. in use of RUE/hand in functional tasks as able, as well as AROM of R digits for edema reduction.  Barriers to return to prior living situation: Lives alone, R hand dominant, strength, balance, pain, endurance   Recommendations for discharge: TCU   Rationale for recommendations: Pt would benefit from TCU at discharge in order to maximize safety and independence with ADL/IADLs and mobility.        Entered by: Ruby Calvert 10/13/2017 2:19 PM         "

## 2017-10-14 ENCOUNTER — APPOINTMENT (OUTPATIENT)
Dept: PHYSICAL THERAPY | Facility: CLINIC | Age: 82
DRG: 605 | End: 2017-10-14
Payer: MEDICARE

## 2017-10-14 VITALS
DIASTOLIC BLOOD PRESSURE: 63 MMHG | WEIGHT: 175.04 LBS | SYSTOLIC BLOOD PRESSURE: 168 MMHG | RESPIRATION RATE: 16 BRPM | BODY MASS INDEX: 35.29 KG/M2 | OXYGEN SATURATION: 94 % | TEMPERATURE: 98.7 F | HEIGHT: 59 IN | HEART RATE: 71 BPM

## 2017-10-14 PROBLEM — S70.01XA CONTUSION OF RIGHT HIP, INITIAL ENCOUNTER: Status: ACTIVE | Noted: 2017-10-14

## 2017-10-14 PROBLEM — M25.559 HIP PAIN: Status: ACTIVE | Noted: 2017-10-14

## 2017-10-14 PROBLEM — M25.531 RIGHT WRIST PAIN: Status: ACTIVE | Noted: 2017-10-14

## 2017-10-14 LAB
GLUCOSE BLDC GLUCOMTR-MCNC: 118 MG/DL (ref 70–99)
GLUCOSE BLDC GLUCOMTR-MCNC: 133 MG/DL (ref 70–99)
GLUCOSE BLDC GLUCOMTR-MCNC: 134 MG/DL (ref 70–99)
GLUCOSE BLDC GLUCOMTR-MCNC: 165 MG/DL (ref 70–99)
HGB BLD-MCNC: 7.3 G/DL (ref 11.7–15.7)
HGB BLD-MCNC: 7.7 G/DL (ref 11.7–15.7)
HGB BLD-MCNC: 7.9 G/DL (ref 11.7–15.7)
HGB BLD-MCNC: 9.4 G/DL (ref 11.7–15.7)
PLATELET # BLD AUTO: 103 10E9/L (ref 150–450)

## 2017-10-14 PROCEDURE — 40000193 ZZH STATISTIC PT WARD VISIT: Performed by: PHYSICAL THERAPIST

## 2017-10-14 PROCEDURE — A9270 NON-COVERED ITEM OR SERVICE: HCPCS | Mod: GY | Performed by: HOSPITALIST

## 2017-10-14 PROCEDURE — A9270 NON-COVERED ITEM OR SERVICE: HCPCS | Mod: GY | Performed by: INTERNAL MEDICINE

## 2017-10-14 PROCEDURE — 25000132 ZZH RX MED GY IP 250 OP 250 PS 637: Mod: GY | Performed by: INTERNAL MEDICINE

## 2017-10-14 PROCEDURE — 97116 GAIT TRAINING THERAPY: CPT | Mod: GP | Performed by: PHYSICAL THERAPIST

## 2017-10-14 PROCEDURE — 99238 HOSP IP/OBS DSCHRG MGMT 30/<: CPT | Performed by: INTERNAL MEDICINE

## 2017-10-14 PROCEDURE — 97530 THERAPEUTIC ACTIVITIES: CPT | Mod: GP | Performed by: PHYSICAL THERAPIST

## 2017-10-14 PROCEDURE — 36415 COLL VENOUS BLD VENIPUNCTURE: CPT | Performed by: INTERNAL MEDICINE

## 2017-10-14 PROCEDURE — 25000132 ZZH RX MED GY IP 250 OP 250 PS 637: Mod: GY | Performed by: HOSPITALIST

## 2017-10-14 PROCEDURE — 00000146 ZZHCL STATISTIC GLUCOSE BY METER IP

## 2017-10-14 PROCEDURE — 85049 AUTOMATED PLATELET COUNT: CPT | Performed by: INTERNAL MEDICINE

## 2017-10-14 PROCEDURE — 85018 HEMOGLOBIN: CPT | Performed by: INTERNAL MEDICINE

## 2017-10-14 RX ORDER — ACETAMINOPHEN 325 MG/1
650 TABLET ORAL EVERY 4 HOURS PRN
Qty: 100 TABLET | Status: ON HOLD | DISCHARGE
Start: 2017-10-14 | End: 2021-07-27

## 2017-10-14 RX ADMIN — ALLOPURINOL 100 MG: 100 TABLET ORAL at 08:16

## 2017-10-14 RX ADMIN — FLUOXETINE 20 MG: 20 CAPSULE ORAL at 08:16

## 2017-10-14 RX ADMIN — Medication 1 TABLET: at 08:15

## 2017-10-14 RX ADMIN — MULTIPLE VITAMINS W/ MINERALS TAB 1 TABLET: TAB at 12:39

## 2017-10-14 RX ADMIN — CYANOCOBALAMIN TAB 1000 MCG 1000 MCG: 1000 TAB at 12:39

## 2017-10-14 RX ADMIN — OMEPRAZOLE 20 MG: 20 CAPSULE, DELAYED RELEASE ORAL at 12:39

## 2017-10-14 RX ADMIN — BRIMONIDINE TARTRATE 1 DROP: 2 SOLUTION/ DROPS OPHTHALMIC at 08:17

## 2017-10-14 RX ADMIN — LINAGLIPTIN 2.5 MG: 5 TABLET, FILM COATED ORAL at 08:16

## 2017-10-14 RX ADMIN — CARBOXYMETHYLCELLULOSE SODIUM 1 DROP: 5 SOLUTION/ DROPS OPHTHALMIC at 08:16

## 2017-10-14 NOTE — PROGRESS NOTES
[facility] PROGRESS NOTE:     I: Pt accepted for admission to [place]. Pt's family will transport her at 1630. SW faxed orders to the TCU at [phone]. Aleshia in admissions confirmed that pt will have a semi-private room, as requested. Bed side nurse has been updated. HUC aware and making packet for DC.   P: Pt will DC to [facility] TCU today at 1630 via family.     RAFAELA Lao, LG [phone]

## 2017-10-14 NOTE — DISCHARGE SUMMARY
[facility]    Discharge Summary  Hospitalist  Lester Jacob MD    Date of Admission:  10/11/2017  Date of Discharge:  10/14/2017  Discharging Provider: Lester Jacob    Discharge Diagnoses   1.  Suspected right radial wrist fracture, post mechanical fall.    2.  Large hematoma on lateral aspect of the right hip.     3.  Mild thrombocytopenia, unknown baseline.    History of Present Illness   Eri Puckett is an 89 year old female, who presented with mechanical fall, with resultant right hip and right wrist pain.    Hospital Course   89-year-old woman, with PmHx of mild intermittent asthma, Type 2 DM, Peripheral Neuropathy, DJD, GERD, Dysthymic disorder, lymphoma in remission, who was admitted on 10/11/17, due to a mechanical fall with resultant right hip and right wrist pain.  Work up done on 10/11/17 revealed, CMP significant for creat 1.2, Alb 3.1. CBC revealed, Plts 135. Lipase 131, initial troponin is negative.  HbA1C was 6.8% on 10/12/17.Pelvis and right hip x-rays on 10/11/17 was negative. Right wrist on 10/11/17 revealed, subtle linear transverse band of relatively increased density within the distal right radial metaphysis. A minimally impacted acute fracture cannot be excluded. Normal alignment of the right wrist. Diffuse osteopenia. Severe degenerative changes in the right first carpometacarpal joint. CT Head on 10/11/17 did not show any acute intracranial abnormality. CT Right hip on 10/11/17 did not show any visualized acute fracture or malalignment of the pelvis or right hip. Large hematoma in the subcutaneous tissues at the lateral aspect of the right hip.        The patient was admitted to the Ortho floor and was reviewed by the Orthopedic Surgeon. She was managed conservatively with a right forearm splint. Her right wrist pain and right hip pain were managed with Tylenol prn.    ECHO done on 10/12/17 due to history of her fall revealed, LV is normal in  size. LVEF is estimated at 65-70%. Grade I or early diastolic dysfunction. No regional wall motion abnormalities noted. There is no thrombus seen in the left ventricle. RV size and function are normal. Trace mild MR and TR. Trace AR.  Moderate valvular aortic stenosis. Calculated aortic valve area is 1.1 cm2. No pericardial effusion. She was noted to have low platelets of 135-->127-->103 on 10/14/17.   She was noted to have a Hb of 6.4g/dl on 10/13/17 and was transfused 1u of PRBCs on 10/13/17. Hb was 7.7 on 10/14/17.  She was reviewed by the physical and occupational therapists and their recommendation was for TCU at discharge, due to decreased muscle strength and patient needing assistance with mobility. She is to follow up with the Orthopedic Surgeon in 1 week post discharge, for application of a removable splint..         Significant Results and Procedures   See above.    Pending Results   None.  Unresulted Labs Ordered in the Past 30 Days of this Admission     No orders found from 8/12/2017 to 10/12/2017.          Code Status   Full Code       Primary Care Physician   Kylee Liang    Physical Exam   Temp: 99.2  F (37.3  C) Temp src: Oral BP: 128/51 Pulse: 75   Resp: 16 SpO2: 95 % O2 Device: None (Room air)    Vitals:    10/12/17 0551 10/13/17 0656 10/14/17 1206   Weight: 77.1 kg (170 lb) 80.7 kg (178 lb) 79.4 kg (175 lb 0.7 oz)     Constitutional: Elderly white female, awake, cooperative, no apparent distress, O2 Sats was 95% on RA  Respiratory: BS vesicular bilaterally, no crackles or wheezing  Cardiovascular: S1 and S2, reg, no murmur noted  GI: Soft, non-distended, non-tender, no masses, BS present+  Skin/Integumen: Ecchymosis of right thigh  Extremities: Swollen right thigh+, due to hematoma. Right forearm splint in situ+    Discharge Disposition   Discharged to rehabilitation facility  Condition at discharge: Stable    Consultations This Hospital Stay   SOCIAL WORK IP CONSULT  ORTHOPEDIC SURGERY IP  CONSULT  PHYSICAL THERAPY ADULT IP CONSULT  OCCUPATIONAL THERAPY ADULT IP CONSULT    Time Spent on this Encounter   I, Lester Jacob, personally saw the patient today and spent less than or equal to 30 minutes discharging this patient.    Discharge Orders   No discharge procedures on file.  Discharge Medications   Current Discharge Medication List      CONTINUE these medications which have NOT CHANGED    Details   FLUOXETINE HCL PO Take 20 mg by mouth 2 times daily      Linagliptin (TRADJENTA PO) Take 2.5 mg by mouth daily      brimonidine (ALPHAGAN-P) 0.15 % ophthalmic solution Place 1 drop Into the left eye 2 times daily      Carboxymethylcellul-Glycerin (REFRESH OPTIVE) 1-0.9 % GEL Apply to eye 2 times daily Gel to left eye      Polyethylene Glycol 400 (BLINK TEARS OP) Apply 1 drop to eye as needed (to both eye as needed)      LISINOPRIL PO Take 15 mg by mouth daily      ASPIRIN EC PO Take 81 mg by mouth daily      ALLOPURINOL PO Take 100 mg by mouth daily      Rosuvastatin Calcium (CRESTOR PO) Take 20 mg by mouth daily      OMEPRAZOLE PO Take 20 mg by mouth every morning      Multiple Vitamins-Minerals (CENTRUM SILVER) per tablet Take 1 tablet by mouth daily      calcium-vitamin D (CALTRATE) 600-400 MG-UNIT per tablet Take 1 tablet by mouth 2 times daily      Cyanocobalamin (VITAMIN B 12 PO) Take 1,000 mcg by mouth daily      Cholecalciferol (VITAMIN D3 PO) Take 2,000 Units by mouth daily           Allergies   Allergies   Allergen Reactions     Cephalexin Rash     Data   Most Recent 3 CBC's:  Recent Labs   Lab Test  10/14/17   1210  10/14/17   0555  10/14/17   0015   10/12/17   0655   10/11/17   0030   WBC   --    --    --    --   6.6   --   4.9   HGB  7.9*  7.7*  7.3*   < >  7.7*   < >  12.6   MCV   --    --    --    --   87   --   87   PLT   --   103*   --    --   127*   --   135*    < > = values in this interval not displayed.      Most Recent 3 BMP's:  Recent Labs   Lab Test  10/12/17   0655   10/11/17   0940  10/11/17   0030   NA  141  139  141   POTASSIUM  3.8  4.6  3.8   CHLORIDE  107  106  104   CO2  27  25  29   BUN  20  25  21   CR  1.16*  1.19*  1.20*   ANIONGAP  7  8  8   GARRETT  7.4*  7.6*  7.9*   GLC  142*  330*  148*     Most Recent 2 LFT's:  Recent Labs   Lab Test  10/11/17   0030   AST  22   ALT  21   ALKPHOS  49   BILITOTAL  0.4     Most Recent INR's and Anticoagulation Dosing History:  Anticoagulation Dose History     Recent Dosing and Labs Latest Ref Rng & Units 12/6/2007 12/14/2007 12/21/2007 10/3/2013 11/26/2013 6/3/2015    INR 0.86 - 1.14 0.98 0.97 1.09 1.01 0.96 0.98        Most Recent 3 Troponin's:  Recent Labs   Lab Test  10/11/17   0030   TROPI  <0.015     Most Recent Cholesterol Panel:No lab results found.  Most Recent 6 Bacteria Isolates From Any Culture (See EPIC Reports for Culture Details):  Recent Labs   Lab Test  06/03/15   1100  10/03/13   1040   CULT  No growth  No anaerobes isolated  No growth     Most Recent TSH, T4 and A1c Labs:  Recent Labs   Lab Test  10/12/17   0655   A1C  6.8*       Results for orders placed or performed during the hospital encounter of 10/11/17   Wrist XR, G/E 3 views, right    Narrative    RIGHT WRIST 3 VIEWS  10/11/2017 1:04 AM     HISTORY: Fall. Pain.    COMPARISON: None.      Impression    IMPRESSION:  1. A subtle linear transverse band of relatively increased density  within the distal right radial metaphysis. This is not convincing for  an acute fracture, but a minimally impacted acute fracture cannot be  entirely excluded.  2. Normal alignment of the right wrist.  3. Diffuse osteopenia.  4. Severe degenerative changes in the right first carpometacarpal  joint.    YOANDY AJ MD   XR Pelvis and Hip Right 1 View    Narrative    PELVIS AND RIGHT HIP 2 VIEWS  10/11/2017 1:03 AM     HISTORY: Pain.    COMPARISON: None.      Impression    IMPRESSION: No visualized acute fracture, malalignment or other acute  osseous abnormality of the visualized  portions of the pelvis or right  hip.     YOANDY AJ MD   Head CT w/o contrast    Narrative    CT HEAD WITHOUT CONTRAST  10/11/2017 1:19 AM     HISTORY: Fall. Vomiting.    COMPARISON: 12/6/2007.    TECHNIQUE: Without intravenous contrast, helical sections were  acquired through the brain. Coronal reconstructions were generated.  Radiation dose for this scan was reduced using automated exposure  control, adjustment of the mA and/or kV according to the patient's  size, or iterative reconstruction technique.    FINDINGS: Mild diffuse cerebral atrophy. Mild bilateral  periventricular white matter low attenuation, likely relating to  chronic small vessel ischemic disease. No intra-axial mass, mass  effect or midline shift. Normal gray-white matter differentiation. No  visualized acute intracranial hemorrhage. The cerebral ventricles are  normal in caliber. The basal cisterns are patent. No extra-axial fluid  collection. The visualized portions of the paranasal sinuses and  mastoid air cells are unremarkable.      Impression    IMPRESSION: No evidence of acute intracranial abnormality.    YOANDY AJ MD   CT Hip Right w/o Contrast    Narrative    CT PELVIC BONE WITHOUT CONTRAST  10/11/2017 5:33 AM     HISTORY: Fall. Pain. Evaluate for occult fracture. Large hematoma on  exam.    COMPARISON: None.    TECHNIQUE: Without intravenous or oral contrast, helical sections were  acquired from the iliac crests through the pubic symphysis. Coronal  and sagittal reconstructions were generated. Radiation dose for this  scan was reduced using automated exposure control, adjustment of the  mA and/or kV according to the patient's size, or iterative  reconstruction technique.    FINDINGS: No visualized acute fractures or malalignment of the pelvic  bone or right hip. An irregular-shaped high attenuation mass-like  structure is present in the deep subcutaneous tissues of the lateral  aspect of the right hip measuring 16 cm in  anteroposterior dimension,  6 cm in transverse dimension, and 15 cm in craniocaudal dimension  (series 3 image 84). There are hazy opacities in the surrounding fat.  This likely represents a hematoma.    The visualized portions of the small and large bowel are normal in  caliber. A few colonic diverticula are present without evidence of  diverticulitis. The uterus is not visualized. No enlarged lymph nodes  or free fluid in the pelvis. Very small bilateral inguinal hernias  containing fat.      Impression    IMPRESSION:  1. No visualized acute fracture or malalignment of the pelvis or right  hip.  2. Large hematoma in the subcutaneous tissues at the lateral aspect of  the right hip.    YOANDY AJ MD

## 2017-10-14 NOTE — PLAN OF CARE
Problem: Patient Care Overview  Goal: Plan of Care/Patient Progress Review  OT: Pt not receptive to OT session during attempt, reporting fatigue

## 2017-10-14 NOTE — PLAN OF CARE
Problem: Patient Care Overview  Goal: Plan of Care/Patient Progress Review  Outcome: Adequate for Discharge Date Met:  10/14/17  Pt a/o, can be forgetful at times. Up with 1. CMS intact, splint to RUE c.d.i. Brusied on R flank, hip and thigh. Denies pain. Voids well. Plan to d/c to TCU via family. Discharge instructions discussed and all questions and concerns addressed.

## 2017-10-14 NOTE — PLAN OF CARE
Problem: Patient Care Overview  Goal: Plan of Care/Patient Progress Review  Outcome: Improving  VSS, A/O x 4, forgetful. Denies pain. CMS intact. R hip hematoma. Up to BR, voids + incontinent. Tele NSR w 1d AVB. Hgb 7.6 and 7.3. 0600 results pending. DC IVF per order. Taking good po. Up to BR 1 A/GB/walker. R arm cast, hand slightly swollen. Elevating.

## 2017-10-14 NOTE — PLAN OF CARE
Problem: Patient Care Overview  Goal: Plan of Care/Patient Progress Review  Discharge Planner PT   Patient plan for discharge: TCU  Current status: Min A with transfers and gait of 150 ft with R platform WW.  Barriers to return to prior living situation: lives alone, needs min A with mobility   Recommendations for discharge: TCU  Rationale for recommendations: decreased strength and needs assist with mobility.  Entered by: Gurwinder Felton 10/14/2017 9:40 AM

## 2017-10-15 NOTE — PLAN OF CARE
Problem: Patient Care Overview  Goal: Plan of Care/Patient Progress Review  Occupational Therapy Discharge Summary     Reason for therapy discharge:    Discharged to transitional care facility.     Progress towards therapy goal(s). See goals on Care Plan in [facility] electronic health record for goal details.  Goals not met.  Barriers to achieving goals:   limited tolerance for therapy and discharge from facility.     Therapy recommendation(s):    Continued therapy is recommended.  Rationale/Recommendations:  Recommend continued OT to increase independence and strength in ADLS.

## 2017-10-15 NOTE — PLAN OF CARE
Problem: Patient Care Overview  Goal: Plan of Care/Patient Progress Review  Physical Therapy Discharge Summary     Reason for therapy discharge:    Discharged to transitional care facility.     Progress towards therapy goal(s). See goals on Care Plan in [facility] electronic health record for goal details.  Goals partially met.  Barriers to achieving goals:   discharge from facility.     Therapy recommendation(s):    Continued therapy is recommended.  Rationale/Recommendations:  To further increase independence with mobility.

## 2017-10-19 LAB — INTERPRETATION ECG - MUSE: NORMAL

## 2019-04-02 ENCOUNTER — APPOINTMENT (OUTPATIENT)
Dept: CT IMAGING | Facility: CLINIC | Age: 84
End: 2019-04-02
Attending: EMERGENCY MEDICINE
Payer: COMMERCIAL

## 2019-04-02 ENCOUNTER — HOSPITAL ENCOUNTER (EMERGENCY)
Facility: CLINIC | Age: 84
Discharge: HOME | End: 2019-04-02
Attending: EMERGENCY MEDICINE | Admitting: EMERGENCY MEDICINE
Payer: COMMERCIAL

## 2019-04-02 ENCOUNTER — APPOINTMENT (OUTPATIENT)
Dept: GENERAL RADIOLOGY | Facility: CLINIC | Age: 84
End: 2019-04-02
Attending: EMERGENCY MEDICINE
Payer: COMMERCIAL

## 2019-04-02 VITALS
WEIGHT: 149 LBS | DIASTOLIC BLOOD PRESSURE: 70 MMHG | SYSTOLIC BLOOD PRESSURE: 160 MMHG | HEIGHT: 59 IN | RESPIRATION RATE: 18 BRPM | OXYGEN SATURATION: 96 % | BODY MASS INDEX: 30.04 KG/M2 | TEMPERATURE: 98.3 F

## 2019-04-02 DIAGNOSIS — S52.615A CLOSED NONDISPLACED FRACTURE OF STYLOID PROCESS OF LEFT ULNA, INITIAL ENCOUNTER: ICD-10-CM

## 2019-04-02 DIAGNOSIS — S00.83XA FOREHEAD CONTUSION, INITIAL ENCOUNTER: ICD-10-CM
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
## 2019-04-02 DIAGNOSIS — S52.532A CLOSED COLLES' FRACTURE OF LEFT RADIUS, INITIAL ENCOUNTER: ICD-10-CM

## 2019-04-02 DIAGNOSIS — S00.33XA CONTUSION OF NOSE, INITIAL ENCOUNTER: ICD-10-CM
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
## 2019-04-02 LAB
ALBUMIN SERPL-MCNC: 3.2 G/DL (ref 3.4–5)
ALP SERPL-CCNC: 50 U/L (ref 40–150)
ALT SERPL W P-5'-P-CCNC: 24 U/L (ref 0–50)
ANION GAP SERPL CALCULATED.3IONS-SCNC: 5 MMOL/L (ref 3–14)
AST SERPL W P-5'-P-CCNC: 35 U/L (ref 0–45)
BASOPHILS # BLD AUTO: 0 10E9/L (ref 0–0.2)
BASOPHILS NFR BLD AUTO: 0 %
BILIRUB SERPL-MCNC: 0.5 MG/DL (ref 0.2–1.3)
BUN SERPL-MCNC: 10 MG/DL (ref 7–30)
CALCIUM SERPL-MCNC: 8.5 MG/DL (ref 8.5–10.1)
CHLORIDE SERPL-SCNC: 107 MMOL/L (ref 94–109)
CO2 SERPL-SCNC: 30 MMOL/L (ref 20–32)
CREAT SERPL-MCNC: 0.92 MG/DL (ref 0.52–1.04)
DIFFERENTIAL METHOD BLD: ABNORMAL
EOSINOPHIL # BLD AUTO: 0.1 10E9/L (ref 0–0.7)
EOSINOPHIL NFR BLD AUTO: 1.1 %
ERYTHROCYTE [DISTWIDTH] IN BLOOD BY AUTOMATED COUNT: 13.4 % (ref 10–15)
GFR SERPL CREATININE-BSD FRML MDRD: 55 ML/MIN/{1.73_M2}
GLUCOSE SERPL-MCNC: 153 MG/DL (ref 70–99)
HCT VFR BLD AUTO: 39.8 % (ref 35–47)
HGB BLD-MCNC: 13.1 G/DL (ref 11.7–15.7)
IMM GRANULOCYTES # BLD: 0 10E9/L (ref 0–0.4)
IMM GRANULOCYTES NFR BLD: 0.4 %
LYMPHOCYTES # BLD AUTO: 0.6 10E9/L (ref 0.8–5.3)
LYMPHOCYTES NFR BLD AUTO: 8.2 %
MCH RBC QN AUTO: 29.4 PG (ref 26.5–33)
MCHC RBC AUTO-ENTMCNC: 32.9 G/DL (ref 31.5–36.5)
MCV RBC AUTO: 89 FL (ref 78–100)
MONOCYTES # BLD AUTO: 0.5 10E9/L (ref 0–1.3)
MONOCYTES NFR BLD AUTO: 6.3 %
NEUTROPHILS # BLD AUTO: 6 10E9/L (ref 1.6–8.3)
NEUTROPHILS NFR BLD AUTO: 84 %
NRBC # BLD AUTO: 0 10*3/UL
NRBC BLD AUTO-RTO: 0 /100
PLATELET # BLD AUTO: 109 10E9/L (ref 150–450)
POTASSIUM SERPL-SCNC: 4 MMOL/L (ref 3.4–5.3)
PROT SERPL-MCNC: 6.2 G/DL (ref 6.8–8.8)
RBC # BLD AUTO: 4.46 10E12/L (ref 3.8–5.2)
SODIUM SERPL-SCNC: 142 MMOL/L (ref 133–144)
WBC # BLD AUTO: 7.1 10E9/L (ref 4–11)

## 2019-04-02 PROCEDURE — 73110 X-RAY EXAM OF WRIST: CPT | Mod: LT

## 2019-04-02 PROCEDURE — 85025 COMPLETE CBC W/AUTO DIFF WBC: CPT | Performed by: EMERGENCY MEDICINE

## 2019-04-02 PROCEDURE — A9270 NON-COVERED ITEM OR SERVICE: HCPCS | Mod: GY | Performed by: EMERGENCY MEDICINE

## 2019-04-02 PROCEDURE — 25605 CLTX DST RDL FX/EPHYS SEP W/: CPT | Mod: LT

## 2019-04-02 PROCEDURE — 80053 COMPREHEN METABOLIC PANEL: CPT | Performed by: EMERGENCY MEDICINE

## 2019-04-02 PROCEDURE — 72125 CT NECK SPINE W/O DYE: CPT

## 2019-04-02 PROCEDURE — 70486 CT MAXILLOFACIAL W/O DYE: CPT

## 2019-04-02 PROCEDURE — 25000132 ZZH RX MED GY IP 250 OP 250 PS 637: Mod: GY | Performed by: EMERGENCY MEDICINE

## 2019-04-02 PROCEDURE — 70450 CT HEAD/BRAIN W/O DYE: CPT

## 2019-04-02 PROCEDURE — 99285 EMERGENCY DEPT VISIT HI MDM: CPT | Mod: 25

## 2019-04-02 RX ORDER — IBUPROFEN 600 MG/1
600 TABLET, FILM COATED ORAL EVERY 8 HOURS PRN
Qty: 30 TABLET | Refills: 0 | Status: SHIPPED | OUTPATIENT
Start: 2019-04-02 | End: 2019-05-02

## 2019-04-02 RX ORDER — ACETAMINOPHEN 500 MG
1000 TABLET ORAL ONCE
Status: COMPLETED | OUTPATIENT
Start: 2019-04-02 | End: 2019-04-02

## 2019-04-02 RX ORDER — TRAMADOL HYDROCHLORIDE 50 MG/1
50 TABLET ORAL EVERY 6 HOURS PRN
Qty: 10 TABLET | Refills: 0 | Status: SHIPPED | OUTPATIENT
Start: 2019-04-02 | End: 2019-04-05

## 2019-04-02 RX ORDER — TRAMADOL HYDROCHLORIDE 50 MG/1
50 TABLET ORAL ONCE
Status: COMPLETED | OUTPATIENT
Start: 2019-04-02 | End: 2019-04-02

## 2019-04-02 RX ADMIN — ACETAMINOPHEN 1000 MG: 500 TABLET, FILM COATED ORAL at 05:35

## 2019-04-02 RX ADMIN — TRAMADOL HYDROCHLORIDE 50 MG: 50 TABLET, COATED ORAL at 05:35

## 2019-04-02 ASSESSMENT — ENCOUNTER SYMPTOMS
CHEST TIGHTNESS: 0
MYALGIAS: 0
FEVER: 0
BACK PAIN: 0
WOUND: 1
HEADACHES: 0
ARTHRALGIAS: 1
NECK PAIN: 0

## 2019-04-02 ASSESSMENT — MIFFLIN-ST. JEOR: SCORE: 993.55

## 2019-04-02 NOTE — ED AVS SNAPSHOT
Emergency Department  [address] [place] [id number]  Phone:  [phone]  Fax:  [phone]                                    Eri Puckett   MRN: [id number]    Department:   Emergency Department   Date of Visit:  4/2/2019           After Visit Summary Signature Page    I have received my discharge instructions, and my questions have been answered. I have discussed any challenges I see with this plan with the nurse or doctor.    ..........................................................................................................................................  Patient/Patient Representative Signature      ..........................................................................................................................................  Patient Representative Print Name and Relationship to Patient    ..................................................               ................................................  Date                                   Time    ..........................................................................................................................................  Reviewed by Signature/Title    ...................................................              ..............................................  Date                                               Time          22EPIC Rev 08/18

## 2019-04-02 NOTE — ED PROVIDER NOTES
History     Chief Complaint:  Fall    HPI   Eri Puckett is a 89 year old female with a history of asthma, CKD, diabetes, hypertension and hyperlipidemia who presents to the emergency department this morning for evaluation and assessment in the setting of a recent mechanical fall. The patient was seen in clinic yesterday for evaluation due to an ongoing cough and was diagnosed with pneumonia. She was placed on a 7 day course of Avelox and took this for the first time last night. This morning then, the patient awoke around 0130 to grab a snack from her kitchen as she had not eaten dinner the night before, when she tripped and lost her balance on her walk back to her bedroom causing her to fall forward against the wood frame of her bed. The patient hit her nose and forehead as well as her outstretch left wrist upon falling but did not sustain a loss of consciousness. She states that she was unable to get up off the ground on her own due to post surgical knee complications but was helped up soon after by other members in her building. She denies head, neck or back pain and does not have any chest pain. She presents with multiple abrasions to her forehead and endorses tenderness to her let wrist (which she has broken in the past) but denies any further concerns or symptoms otherwise. The patient is not anticoagulated. She denies any numbness, tingling, or weakness.    Allergies:  Cephalexin    Medications:    Allopurinol   Fluoxetine   Linagliptin   Lisinopril  Omeprazole   Crestor   Zyloprim   Prozac   Maxitrol     Past Medical History:    Asthma   Cancer, basal cell    Diabetes   Hypertension   Heart murmur   Asthma   Hyperlipidemia   Osteoarthritis   CKD    Past Surgical History:    Mohs head, neck and hands   Total knee arthroplasty, right   Vitrectomy     Family History:    History reviewed. No pertinent family history.      Social History:  The patient was accompanied to the ED by her daughter and  "son-in-law.  Smoking Status: Never  Smokeless Tobacco: Never  Alcohol Use: No   Marital Status:   [5]     Review of Systems   Constitutional: Negative for fever.   Respiratory: Negative for chest tightness.    Cardiovascular: Negative for chest pain.   Musculoskeletal: Positive for arthralgias (left wrist ). Negative for back pain, myalgias and neck pain.   Skin: Positive for wound.   Neurological: Negative for syncope and headaches.   All other systems reviewed and are negative.      Physical Exam     Patient Vitals for the past 24 hrs:   BP Temp Temp src Heart Rate Resp SpO2 Height Weight   04/02/19 0650 160/70 -- -- -- -- -- -- --   04/02/19 0401 170/73 98.3  F (36.8  C) Oral 65 18 96 % 1.486 m (4' 10.5\") 67.6 kg (149 lb)     Physical Exam  General: Well appearing, nontoxic. Resting comfortably  Head:  Large left frontal hematoma with overlying superficial abrasion and ecchymosis. Minimal tenderness to palpation. No bony crepitus or step offs. Head/scalp otherwise atraumatic.  Eyes:  Pupils are equal, round, and reactive to light, EOMI, no nystagmus     Conjunctivae non-injected and sclerae white  ENT:    The external nose with mild swelling ecchymosis. No deformity or significant bony tenderness. No septal hematoma. No epistaxis. The remainder of the facial bones are non tender to palpation.    Pinnae are normal. No otorrhea    The oropharynx is normal, mucous membranes moist    Uvula is in the midline  Neck:  Normal range of motion. Cervical spine nontender, no stepoffs    There is no rigidity noted    Trachea is in the midline  CV:  Regular rate and rhythm     Normal S1/S2, no S3/S4    No murmur or rub. Radial pulses 2+ bilaterally   Resp:  Lungs are clear and equal bilaterally    There is no tachypnea    No increased work of breathing    No rales, wheezing, or rhonchi  GI:  Abdomen is soft, no rigidity or guarding    No distension, or mass    No tenderness or rebound tenderness   MS:  Normal muscular " tone. T and L spine non-tender without stepoffs.     left wrist with swelling ecchymosis and tenderness to palpation. ROM of the left wrist limited 2/2 pain. The remainder of the bilateral upper extremities and lower extremities are otherwise atraumatic with full ROM.     Symmetric motor strength    No lower extremity edema  Skin:  Abrasions to nose and left forehead. Otherwise no rashes or other wounds.  Neuro: A&Ox3, GCS 15    CN II - XII intact    Speech clear, fluent, and normal    Strength 5/5 and symmetric in bilateral upper and lower extremities.    No pronator drift. SILT throughout.      no ankle clonus    FTN testing normal. No tremor.     No meningismus   Psych:  Normal affect.  Appropriate interactions.     Emergency Department Course     Imaging:  Radiology findings were communicated with the patient who voiced understanding of the findings.     CT Head w/o Contrast:  IMPRESSION: No acute intracranial abnormality    As read by radiology    CT Facial Bones w/o Contrast:  IMPRESSION: No acute facial fractures    As read by radiology    Cervical Spine CT w/o Contrast:  IMPRESSION:  1. No fracture or other acute findings.  2. Degenerative changes    As read by radiology     XR Wrist Left G/E 3 Views:   IMPRESSION: Mildly displaced and impacted fracture of the distal  radius with slight dorsal angulation of the distal fracture fragment.  Associated ulnar styloid fracture without significant displacement.  Advanced degenerative changes at the base of the thumb and STT joints    As read by radiology     Laboratory:  Laboratory findings were communicated with the patient who voiced understanding of the findings.     CBC:  (L) o/w WNL (WBC 7.1, HGB 13.1)   CMP:  (H), GFR 55 (L), Albumin 3.2 (L), protein 6.2 (L), o/w (Creatinine 0.92)     Procedures:    Procedure Note: Reduction of Fractured distal radius (Left)  Physician: Fabrizio Akers MD  Diagnosis: Left distal radius fracture   Consent:  Verbal  Description of Procedure: Consent as above.  Left distal radius/ulna fracture with impaction and slight dorsal angulation. The patients left upper extremity was placed in finger traps and hung at her side. 10lbs of weight applied to the extremity. Dorsal angulation was then exaggerated and volar and axial force applied to the wrist. Following reduction there was good clinical alignment without deformity. The neurovascular exam was normal before and after reduction attempt.  The patient tolerated the procedure well, there were no complications.      Procedure Note: Splint Placement  Physician: Fabrizio Akers MD  Diagnosis: Left distal radius/ulna fracture   Description of Procedure:  Following reduction of wrist fracture a sugartong splint was applied (orthoglass) to the left upper extremity.  The elbow was maintained at 90 degrees flexion. Sling applied.  The neurovascular exam was normal before and after splint placement.  The patient tolerated the procedure well, there were no complications. A sling was applied.      Interventions:  0535 - Tylenol tablet 1,000 mg PO   0535 - Ultram tablet 50 mg PO   Medications   acetaminophen (TYLENOL) tablet 1,000 mg (1,000 mg Oral Given 4/2/19 0535)   traMADol (ULTRAM) tablet 50 mg (50 mg Oral Given 4/2/19 0535)        Emergency Department Course:  Nursing notes and vitals reviewed.     0444: I performed an exam of the patient as documented above.     0602: Patient rechecked and updated. I performed a left wrist reduction as documented above      Findings and plan explained to the Patient. Patient discharged home with instructions regarding supportive care, medications, and reasons to return. The importance of close follow-up was reviewed.     Impression & Plan      Medical Decision Making:  Eri Puckett is a 89 year old female who presents for evaluation of wrist pain after fall. CMS is intact distally in the extremity. Patient also with evidence of head and facial  trauma. CT head/face/c-spine obtained and thankfully negative for any significant traumatic injuries. X-ray of the left wrist revealed impacted and slightly dorsally angulated fracture of the left wrist. Closed reduction and splinting performed as noted above.  The patients head to toe trauma exam is otherwise normal at this time and no further trauma workup is needed as I believe there is no signs of serious head, neck, chest, spinal, extremity or abdominal injuries warranting this. Labs are reassuring. Patient is at her neurological baseline without any deficits per exam as well as family report. I recommended close follow up with orthopedics and her PCP. I discussed that further orthopedic intervention including possible surgery may be needed after orthopedic consultation. No neurovascular compromise. The patient and family are agreeable with the plan of care. Return precautions were discussed with patient. The patient's questions were answered and the patient was agreeable with discharge.       Diagnosis:    ICD-10-CM    1. Fall at home, initial encounter W19.XXXA     Y92.009    2. Closed Colles' fracture of left radius, initial encounter S52.532A    3. Closed nondisplaced fracture of styloid process of left ulna, initial encounter S52.615A    4. Forehead contusion, initial encounter S00.83XA    5. Contusion of nose, initial encounter S00.33XA        Disposition:  discharged to home    Discharge Medications:     Medication List      Started    ibuprofen 600 MG tablet  Commonly known as:  ADVIL/MOTRIN  600 mg, Oral, EVERY 8 HOURS PRN, Take with food.     traMADol 50 MG tablet  Commonly known as:  ULTRAM  50 mg, Oral, EVERY 6 HOURS PRN            Vickie Jones  4/2/2019    EMERGENCY DEPARTMENT  Vickie WILKINSON am serving as a scribe at 4:44 AM on 4/2/2019 to document services personally performed by Fabrizio Akers MD based on my observations and the provider's statements to me.      Fabrizio Akers,  MD  04/03/19 125

## 2021-07-27 ENCOUNTER — APPOINTMENT (OUTPATIENT)
Dept: GENERAL RADIOLOGY | Facility: CLINIC | Age: 86
DRG: 480 | End: 2021-07-27
Attending: EMERGENCY MEDICINE
Payer: COMMERCIAL

## 2021-07-27 ENCOUNTER — APPOINTMENT (OUTPATIENT)
Dept: GENERAL RADIOLOGY | Facility: CLINIC | Age: 86
DRG: 480 | End: 2021-07-27
Attending: HOSPITALIST
Payer: COMMERCIAL

## 2021-07-27 ENCOUNTER — HOSPITAL ENCOUNTER (INPATIENT)
Facility: CLINIC | Age: 86
LOS: 10 days | Discharge: NURSING, REHAB OR LONG-TERM CARE | DRG: 480 | End: 2021-08-06
Attending: EMERGENCY MEDICINE | Admitting: HOSPITALIST
Payer: COMMERCIAL

## 2021-07-27 ENCOUNTER — ANESTHESIA EVENT (OUTPATIENT)
Dept: SURGERY | Facility: CLINIC | Age: 86
DRG: 480 | End: 2021-07-27
Payer: COMMERCIAL

## 2021-07-27 ENCOUNTER — APPOINTMENT (OUTPATIENT)
Dept: CT IMAGING | Facility: CLINIC | Age: 86
DRG: 480 | End: 2021-07-27
Attending: EMERGENCY MEDICINE
Payer: COMMERCIAL

## 2021-07-27 ENCOUNTER — ANESTHESIA (OUTPATIENT)
Dept: SURGERY | Facility: CLINIC | Age: 86
DRG: 480 | End: 2021-07-27
Payer: COMMERCIAL

## 2021-07-27 DIAGNOSIS — R93.89 ABNORMAL CHEST X-RAY: ICD-10-CM

## 2021-07-27 DIAGNOSIS — D64.9 ANEMIA, UNSPECIFIED TYPE: Primary | ICD-10-CM

## 2021-07-27 DIAGNOSIS — S72.002A HIP FRACTURE, LEFT, CLOSED, INITIAL ENCOUNTER (H): ICD-10-CM

## 2021-07-27 DIAGNOSIS — F03.90 DEMENTIA WITHOUT BEHAVIORAL DISTURBANCE, UNSPECIFIED DEMENTIA TYPE: ICD-10-CM

## 2021-07-27 LAB
ALBUMIN SERPL-MCNC: 3 G/DL (ref 3.4–5)
ALP SERPL-CCNC: 51 U/L (ref 40–150)
ALT SERPL W P-5'-P-CCNC: 23 U/L (ref 0–50)
ANION GAP SERPL CALCULATED.3IONS-SCNC: 3 MMOL/L (ref 3–14)
AST SERPL W P-5'-P-CCNC: 23 U/L (ref 0–45)
ATRIAL RATE - MUSE: 74 BPM
BASOPHILS # BLD AUTO: 0 10E3/UL (ref 0–0.2)
BASOPHILS NFR BLD AUTO: 0 %
BILIRUB SERPL-MCNC: 0.4 MG/DL (ref 0.2–1.3)
BUN SERPL-MCNC: 15 MG/DL (ref 7–30)
CALCIUM SERPL-MCNC: 8.4 MG/DL (ref 8.5–10.1)
CHLORIDE BLD-SCNC: 108 MMOL/L (ref 94–109)
CO2 SERPL-SCNC: 30 MMOL/L (ref 20–32)
CREAT SERPL-MCNC: 0.9 MG/DL (ref 0.52–1.04)
DIASTOLIC BLOOD PRESSURE - MUSE: NORMAL MMHG
EOSINOPHIL # BLD AUTO: 0 10E3/UL (ref 0–0.7)
EOSINOPHIL NFR BLD AUTO: 0 %
ERYTHROCYTE [DISTWIDTH] IN BLOOD BY AUTOMATED COUNT: 12.6 % (ref 10–15)
GFR SERPL CREATININE-BSD FRML MDRD: 55 ML/MIN/1.73M2
GLUCOSE BLD-MCNC: 207 MG/DL (ref 70–99)
GLUCOSE BLDC GLUCOMTR-MCNC: 130 MG/DL (ref 70–99)
GLUCOSE BLDC GLUCOMTR-MCNC: 158 MG/DL (ref 70–99)
GLUCOSE BLDC GLUCOMTR-MCNC: 160 MG/DL (ref 70–99)
HCT VFR BLD AUTO: 40.9 % (ref 35–47)
HGB BLD-MCNC: 13.1 G/DL (ref 11.7–15.7)
HOLD SPECIMEN: NORMAL
IMM GRANULOCYTES # BLD: 0.1 10E3/UL
IMM GRANULOCYTES NFR BLD: 0 %
INTERPRETATION ECG - MUSE: NORMAL
LYMPHOCYTES # BLD AUTO: 0.7 10E3/UL (ref 0.8–5.3)
LYMPHOCYTES NFR BLD AUTO: 6 %
MCH RBC QN AUTO: 28.1 PG (ref 26.5–33)
MCHC RBC AUTO-ENTMCNC: 32 G/DL (ref 31.5–36.5)
MCV RBC AUTO: 88 FL (ref 78–100)
MONOCYTES # BLD AUTO: 0.5 10E3/UL (ref 0–1.3)
MONOCYTES NFR BLD AUTO: 5 %
NEUTROPHILS # BLD AUTO: 10 10E3/UL (ref 1.6–8.3)
NEUTROPHILS NFR BLD AUTO: 89 %
NRBC # BLD AUTO: 0 10E3/UL
NRBC BLD AUTO-RTO: 0 /100
P AXIS - MUSE: 73 DEGREES
PLATELET # BLD AUTO: 154 10E3/UL (ref 150–450)
POTASSIUM BLD-SCNC: 3.7 MMOL/L (ref 3.4–5.3)
PR INTERVAL - MUSE: 268 MS
PROT SERPL-MCNC: 5.8 G/DL (ref 6.8–8.8)
QRS DURATION - MUSE: 94 MS
QT - MUSE: 440 MS
QTC - MUSE: 488 MS
R AXIS - MUSE: -16 DEGREES
RBC # BLD AUTO: 4.66 10E6/UL (ref 3.8–5.2)
SARS-COV-2 RNA RESP QL NAA+PROBE: NEGATIVE
SODIUM SERPL-SCNC: 141 MMOL/L (ref 133–144)
SYSTOLIC BLOOD PRESSURE - MUSE: NORMAL MMHG
T AXIS - MUSE: 76 DEGREES
TROPONIN I SERPL-MCNC: <0.015 UG/L (ref 0–0.04)
VENTRICULAR RATE- MUSE: 74 BPM
WBC # BLD AUTO: 11.4 10E3/UL (ref 4–11)

## 2021-07-27 PROCEDURE — 82306 VITAMIN D 25 HYDROXY: CPT | Performed by: PHYSICIAN ASSISTANT

## 2021-07-27 PROCEDURE — 250N000011 HC RX IP 250 OP 636: Performed by: ANESTHESIOLOGY

## 2021-07-27 PROCEDURE — 36415 COLL VENOUS BLD VENIPUNCTURE: CPT | Performed by: EMERGENCY MEDICINE

## 2021-07-27 PROCEDURE — 272N000001 HC OR GENERAL SUPPLY STERILE: Performed by: ORTHOPAEDIC SURGERY

## 2021-07-27 PROCEDURE — 82247 BILIRUBIN TOTAL: CPT | Performed by: EMERGENCY MEDICINE

## 2021-07-27 PROCEDURE — 250N000011 HC RX IP 250 OP 636: Performed by: EMERGENCY MEDICINE

## 2021-07-27 PROCEDURE — 96374 THER/PROPH/DIAG INJ IV PUSH: CPT

## 2021-07-27 PROCEDURE — 258N000003 HC RX IP 258 OP 636: Performed by: ANESTHESIOLOGY

## 2021-07-27 PROCEDURE — 999N000179 XR SURGERY CARM FLUORO LESS THAN 5 MIN W STILLS

## 2021-07-27 PROCEDURE — 360N000084 HC SURGERY LEVEL 4 W/ FLUORO, PER MIN: Performed by: ORTHOPAEDIC SURGERY

## 2021-07-27 PROCEDURE — 93005 ELECTROCARDIOGRAM TRACING: CPT

## 2021-07-27 PROCEDURE — 84484 ASSAY OF TROPONIN QUANT: CPT | Performed by: EMERGENCY MEDICINE

## 2021-07-27 PROCEDURE — 250N000009 HC RX 250: Performed by: ANESTHESIOLOGY

## 2021-07-27 PROCEDURE — 250N000009 HC RX 250: Performed by: PHYSICIAN ASSISTANT

## 2021-07-27 PROCEDURE — 70450 CT HEAD/BRAIN W/O DYE: CPT

## 2021-07-27 PROCEDURE — 250N000012 HC RX MED GY IP 250 OP 636 PS 637: Performed by: HOSPITALIST

## 2021-07-27 PROCEDURE — 99223 1ST HOSP IP/OBS HIGH 75: CPT | Mod: AI | Performed by: HOSPITALIST

## 2021-07-27 PROCEDURE — 71045 X-RAY EXAM CHEST 1 VIEW: CPT

## 2021-07-27 PROCEDURE — 96375 TX/PRO/DX INJ NEW DRUG ADDON: CPT

## 2021-07-27 PROCEDURE — 85025 COMPLETE CBC W/AUTO DIFF WBC: CPT | Performed by: EMERGENCY MEDICINE

## 2021-07-27 PROCEDURE — 250N000011 HC RX IP 250 OP 636: Performed by: PHYSICIAN ASSISTANT

## 2021-07-27 PROCEDURE — 250N000025 HC SEVOFLURANE, PER MIN: Performed by: ORTHOPAEDIC SURGERY

## 2021-07-27 PROCEDURE — 370N000017 HC ANESTHESIA TECHNICAL FEE, PER MIN: Performed by: ORTHOPAEDIC SURGERY

## 2021-07-27 PROCEDURE — 258N000003 HC RX IP 258 OP 636: Performed by: PHYSICIAN ASSISTANT

## 2021-07-27 PROCEDURE — 999N000141 HC STATISTIC PRE-PROCEDURE NURSING ASSESSMENT: Performed by: ORTHOPAEDIC SURGERY

## 2021-07-27 PROCEDURE — C9803 HOPD COVID-19 SPEC COLLECT: HCPCS

## 2021-07-27 PROCEDURE — 250N000011 HC RX IP 250 OP 636: Performed by: HOSPITALIST

## 2021-07-27 PROCEDURE — 51702 INSERT TEMP BLADDER CATH: CPT

## 2021-07-27 PROCEDURE — 710N000010 HC RECOVERY PHASE 1, LEVEL 2, PER MIN: Performed by: ORTHOPAEDIC SURGERY

## 2021-07-27 PROCEDURE — 73502 X-RAY EXAM HIP UNI 2-3 VIEWS: CPT

## 2021-07-27 PROCEDURE — 0SSB04Z REPOSITION LEFT HIP JOINT WITH INTERNAL FIXATION DEVICE, OPEN APPROACH: ICD-10-PCS | Performed by: ORTHOPAEDIC SURGERY

## 2021-07-27 PROCEDURE — 250N000009 HC RX 250: Performed by: ORTHOPAEDIC SURGERY

## 2021-07-27 PROCEDURE — 120N000001 HC R&B MED SURG/OB

## 2021-07-27 PROCEDURE — 250N000013 HC RX MED GY IP 250 OP 250 PS 637: Performed by: PHYSICIAN ASSISTANT

## 2021-07-27 PROCEDURE — C1713 ANCHOR/SCREW BN/BN,TIS/BN: HCPCS | Performed by: ORTHOPAEDIC SURGERY

## 2021-07-27 PROCEDURE — 87635 SARS-COV-2 COVID-19 AMP PRB: CPT | Performed by: EMERGENCY MEDICINE

## 2021-07-27 PROCEDURE — 258N000003 HC RX IP 258 OP 636: Performed by: HOSPITALIST

## 2021-07-27 PROCEDURE — 99285 EMERGENCY DEPT VISIT HI MDM: CPT | Mod: 25

## 2021-07-27 DEVICE — IMP SCR SYN CORTEX 4.5X42MM SELF TAP SS 214.842: Type: IMPLANTABLE DEVICE | Site: HIP | Status: FUNCTIONAL

## 2021-07-27 DEVICE — IMP SCR SYN DHS/DCS LAG 12.7X90MM 1 STEP SS 280.290: Type: IMPLANTABLE DEVICE | Site: HIP | Status: FUNCTIONAL

## 2021-07-27 DEVICE — IMP SCR SYN CORTEX 4.5X36MM SELF TAP SS 214.836: Type: IMPLANTABLE DEVICE | Site: HIP | Status: FUNCTIONAL

## 2021-07-27 DEVICE — IMP SCR SYN CORTEX 4.5X38MM SELF TAP SS 214.838: Type: IMPLANTABLE DEVICE | Site: HIP | Status: FUNCTIONAL

## 2021-07-27 DEVICE — IMP PLATE SYN DHS 135DEG 78MM 04H SS 281.140: Type: IMPLANTABLE DEVICE | Site: HIP | Status: FUNCTIONAL

## 2021-07-27 RX ORDER — SODIUM CHLORIDE, SODIUM LACTATE, POTASSIUM CHLORIDE, CALCIUM CHLORIDE 600; 310; 30; 20 MG/100ML; MG/100ML; MG/100ML; MG/100ML
INJECTION, SOLUTION INTRAVENOUS CONTINUOUS
Status: DISCONTINUED | OUTPATIENT
Start: 2021-07-27 | End: 2021-07-27 | Stop reason: HOSPADM

## 2021-07-27 RX ORDER — TRAMADOL HYDROCHLORIDE 50 MG/1
50 TABLET ORAL EVERY 6 HOURS PRN
Status: DISCONTINUED | OUTPATIENT
Start: 2021-07-27 | End: 2021-08-06 | Stop reason: HOSPADM

## 2021-07-27 RX ORDER — AMLODIPINE BESYLATE 5 MG/1
5 TABLET ORAL
COMMUNITY

## 2021-07-27 RX ORDER — LIDOCAINE 40 MG/G
CREAM TOPICAL
Status: DISCONTINUED | OUTPATIENT
Start: 2021-07-27 | End: 2021-08-06 | Stop reason: HOSPADM

## 2021-07-27 RX ORDER — FENTANYL CITRATE 50 UG/ML
25 INJECTION, SOLUTION INTRAMUSCULAR; INTRAVENOUS
Status: DISCONTINUED | OUTPATIENT
Start: 2021-07-27 | End: 2021-07-27 | Stop reason: HOSPADM

## 2021-07-27 RX ORDER — NICOTINE POLACRILEX 4 MG
15-30 LOZENGE BUCCAL
Status: DISCONTINUED | OUTPATIENT
Start: 2021-07-27 | End: 2021-08-06 | Stop reason: HOSPADM

## 2021-07-27 RX ORDER — NALOXONE HYDROCHLORIDE 0.4 MG/ML
0.2 INJECTION, SOLUTION INTRAMUSCULAR; INTRAVENOUS; SUBCUTANEOUS
Status: DISCONTINUED | OUTPATIENT
Start: 2021-07-27 | End: 2021-08-06 | Stop reason: HOSPADM

## 2021-07-27 RX ORDER — ONDANSETRON 2 MG/ML
4 INJECTION INTRAMUSCULAR; INTRAVENOUS EVERY 6 HOURS PRN
Status: DISCONTINUED | OUTPATIENT
Start: 2021-07-27 | End: 2021-08-06 | Stop reason: HOSPADM

## 2021-07-27 RX ORDER — FLUOXETINE 10 MG/1
20 TABLET, FILM COATED ORAL EVERY MORNING
COMMUNITY

## 2021-07-27 RX ORDER — DEXAMETHASONE SODIUM PHOSPHATE 4 MG/ML
INJECTION, SOLUTION INTRA-ARTICULAR; INTRALESIONAL; INTRAMUSCULAR; INTRAVENOUS; SOFT TISSUE PRN
Status: DISCONTINUED | OUTPATIENT
Start: 2021-07-27 | End: 2021-07-27

## 2021-07-27 RX ORDER — ACETAMINOPHEN 325 MG/1
650 TABLET ORAL EVERY 4 HOURS PRN
Status: DISCONTINUED | OUTPATIENT
Start: 2021-07-30 | End: 2021-08-06 | Stop reason: HOSPADM

## 2021-07-27 RX ORDER — LOPERAMIDE HCL 2 MG
2 CAPSULE ORAL 4 TIMES DAILY PRN
COMMUNITY

## 2021-07-27 RX ORDER — DONEPEZIL HYDROCHLORIDE 10 MG/1
20 TABLET, FILM COATED ORAL AT BEDTIME
COMMUNITY

## 2021-07-27 RX ORDER — FLUOXETINE 10 MG/1
20 CAPSULE ORAL DAILY
Status: ON HOLD | COMMUNITY
End: 2021-07-27

## 2021-07-27 RX ORDER — DEXTROSE MONOHYDRATE 25 G/50ML
25-50 INJECTION, SOLUTION INTRAVENOUS
Status: DISCONTINUED | OUTPATIENT
Start: 2021-07-27 | End: 2021-08-06 | Stop reason: HOSPADM

## 2021-07-27 RX ORDER — FENTANYL CITRATE 50 UG/ML
INJECTION, SOLUTION INTRAMUSCULAR; INTRAVENOUS PRN
Status: DISCONTINUED | OUTPATIENT
Start: 2021-07-27 | End: 2021-07-27

## 2021-07-27 RX ORDER — BISACODYL 10 MG
10 SUPPOSITORY, RECTAL RECTAL DAILY PRN
Status: DISCONTINUED | OUTPATIENT
Start: 2021-07-27 | End: 2021-08-06 | Stop reason: HOSPADM

## 2021-07-27 RX ORDER — SODIUM CHLORIDE, SODIUM LACTATE, POTASSIUM CHLORIDE, CALCIUM CHLORIDE 600; 310; 30; 20 MG/100ML; MG/100ML; MG/100ML; MG/100ML
INJECTION, SOLUTION INTRAVENOUS CONTINUOUS
Status: DISCONTINUED | OUTPATIENT
Start: 2021-07-27 | End: 2021-08-06 | Stop reason: HOSPADM

## 2021-07-27 RX ORDER — ACETAMINOPHEN 325 MG/1
975 TABLET ORAL EVERY 8 HOURS
Status: DISPENSED | OUTPATIENT
Start: 2021-07-27 | End: 2021-07-30

## 2021-07-27 RX ORDER — TRANEXAMIC ACID 10 MG/ML
1 INJECTION, SOLUTION INTRAVENOUS ONCE
Status: COMPLETED | OUTPATIENT
Start: 2021-07-27 | End: 2021-07-27

## 2021-07-27 RX ORDER — ONDANSETRON 2 MG/ML
4 INJECTION INTRAMUSCULAR; INTRAVENOUS EVERY 30 MIN PRN
Status: DISCONTINUED | OUTPATIENT
Start: 2021-07-27 | End: 2021-07-27 | Stop reason: HOSPADM

## 2021-07-27 RX ORDER — POLYETHYLENE GLYCOL 3350 17 G/17G
17 POWDER, FOR SOLUTION ORAL DAILY PRN
Status: DISCONTINUED | OUTPATIENT
Start: 2021-07-27 | End: 2021-08-06 | Stop reason: HOSPADM

## 2021-07-27 RX ORDER — ALBUTEROL SULFATE 90 UG/1
2 AEROSOL, METERED RESPIRATORY (INHALATION) 3 TIMES DAILY PRN
Status: ON HOLD | COMMUNITY
End: 2021-07-27

## 2021-07-27 RX ORDER — POLYETHYLENE GLYCOL 3350 17 G/17G
17 POWDER, FOR SOLUTION ORAL DAILY
Status: DISCONTINUED | OUTPATIENT
Start: 2021-07-28 | End: 2021-08-06 | Stop reason: HOSPADM

## 2021-07-27 RX ORDER — ONDANSETRON 2 MG/ML
INJECTION INTRAMUSCULAR; INTRAVENOUS PRN
Status: DISCONTINUED | OUTPATIENT
Start: 2021-07-27 | End: 2021-07-27

## 2021-07-27 RX ORDER — NALOXONE HYDROCHLORIDE 0.4 MG/ML
0.4 INJECTION, SOLUTION INTRAMUSCULAR; INTRAVENOUS; SUBCUTANEOUS
Status: DISCONTINUED | OUTPATIENT
Start: 2021-07-27 | End: 2021-08-06 | Stop reason: HOSPADM

## 2021-07-27 RX ORDER — HYDROMORPHONE HCL IN WATER/PF 6 MG/30 ML
0.2 PATIENT CONTROLLED ANALGESIA SYRINGE INTRAVENOUS EVERY 6 HOURS PRN
Status: DISCONTINUED | OUTPATIENT
Start: 2021-07-27 | End: 2021-08-06 | Stop reason: HOSPADM

## 2021-07-27 RX ORDER — LIDOCAINE HYDROCHLORIDE 20 MG/ML
INJECTION, SOLUTION INFILTRATION; PERINEURAL PRN
Status: DISCONTINUED | OUTPATIENT
Start: 2021-07-27 | End: 2021-07-27

## 2021-07-27 RX ORDER — ONDANSETRON 2 MG/ML
4 INJECTION INTRAMUSCULAR; INTRAVENOUS EVERY 6 HOURS PRN
Status: DISCONTINUED | OUTPATIENT
Start: 2021-07-27 | End: 2021-07-27

## 2021-07-27 RX ORDER — NEOSTIGMINE METHYLSULFATE 1 MG/ML
VIAL (ML) INJECTION PRN
Status: DISCONTINUED | OUTPATIENT
Start: 2021-07-27 | End: 2021-07-27

## 2021-07-27 RX ORDER — CLINDAMYCIN PHOSPHATE 900 MG/50ML
900 INJECTION, SOLUTION INTRAVENOUS EVERY 8 HOURS
Status: COMPLETED | OUTPATIENT
Start: 2021-07-27 | End: 2021-07-28

## 2021-07-27 RX ORDER — TRANEXAMIC ACID 10 MG/ML
1 INJECTION, SOLUTION INTRAVENOUS ONCE
Status: DISCONTINUED | OUTPATIENT
Start: 2021-07-27 | End: 2021-07-27 | Stop reason: HOSPADM

## 2021-07-27 RX ORDER — ALLOPURINOL 100 MG/1
100 TABLET ORAL EVERY MORNING
COMMUNITY

## 2021-07-27 RX ORDER — BRIMONIDINE TARTRATE 2 MG/ML
1 SOLUTION/ DROPS OPHTHALMIC 2 TIMES DAILY
COMMUNITY

## 2021-07-27 RX ORDER — ONDANSETRON 4 MG/1
4 TABLET, ORALLY DISINTEGRATING ORAL EVERY 6 HOURS PRN
Status: DISCONTINUED | OUTPATIENT
Start: 2021-07-27 | End: 2021-07-27

## 2021-07-27 RX ORDER — HYDROMORPHONE HYDROCHLORIDE 1 MG/ML
0.2 INJECTION, SOLUTION INTRAMUSCULAR; INTRAVENOUS; SUBCUTANEOUS
Status: DISCONTINUED | OUTPATIENT
Start: 2021-07-27 | End: 2021-07-27

## 2021-07-27 RX ORDER — BISACODYL 10 MG
10 SUPPOSITORY, RECTAL RECTAL DAILY PRN
Status: DISCONTINUED | OUTPATIENT
Start: 2021-07-27 | End: 2021-07-27

## 2021-07-27 RX ORDER — ONDANSETRON 4 MG/1
4 TABLET, ORALLY DISINTEGRATING ORAL EVERY 30 MIN PRN
Status: DISCONTINUED | OUTPATIENT
Start: 2021-07-27 | End: 2021-07-27 | Stop reason: HOSPADM

## 2021-07-27 RX ORDER — CEFAZOLIN SODIUM 1 G/3ML
1 INJECTION, POWDER, FOR SOLUTION INTRAMUSCULAR; INTRAVENOUS EVERY 8 HOURS
Status: DISCONTINUED | OUTPATIENT
Start: 2021-07-27 | End: 2021-07-27

## 2021-07-27 RX ORDER — ONDANSETRON 4 MG/1
4 TABLET, ORALLY DISINTEGRATING ORAL EVERY 6 HOURS PRN
Status: DISCONTINUED | OUTPATIENT
Start: 2021-07-27 | End: 2021-08-06 | Stop reason: HOSPADM

## 2021-07-27 RX ORDER — ESMOLOL HYDROCHLORIDE 10 MG/ML
INJECTION INTRAVENOUS PRN
Status: DISCONTINUED | OUTPATIENT
Start: 2021-07-27 | End: 2021-07-27

## 2021-07-27 RX ORDER — SODIUM CHLORIDE 9 MG/ML
INJECTION, SOLUTION INTRAVENOUS CONTINUOUS
Status: ACTIVE | OUTPATIENT
Start: 2021-07-27 | End: 2021-07-27

## 2021-07-27 RX ORDER — PROPOFOL 10 MG/ML
INJECTION, EMULSION INTRAVENOUS PRN
Status: DISCONTINUED | OUTPATIENT
Start: 2021-07-27 | End: 2021-07-27

## 2021-07-27 RX ORDER — CHOLECALCIFEROL (VITAMIN D3) 50 MCG
50 TABLET ORAL
COMMUNITY

## 2021-07-27 RX ORDER — PROCHLORPERAZINE MALEATE 5 MG
5 TABLET ORAL EVERY 6 HOURS PRN
Status: DISCONTINUED | OUTPATIENT
Start: 2021-07-27 | End: 2021-08-06 | Stop reason: HOSPADM

## 2021-07-27 RX ORDER — HYDROMORPHONE HCL IN WATER/PF 6 MG/30 ML
0.4 PATIENT CONTROLLED ANALGESIA SYRINGE INTRAVENOUS EVERY 5 MIN PRN
Status: DISCONTINUED | OUTPATIENT
Start: 2021-07-27 | End: 2021-07-27 | Stop reason: HOSPADM

## 2021-07-27 RX ORDER — FENTANYL CITRATE 50 UG/ML
25 INJECTION, SOLUTION INTRAMUSCULAR; INTRAVENOUS ONCE
Status: COMPLETED | OUTPATIENT
Start: 2021-07-27 | End: 2021-07-27

## 2021-07-27 RX ORDER — ACETAMINOPHEN 325 MG/1
650 TABLET ORAL EVERY 6 HOURS PRN
Status: DISCONTINUED | OUTPATIENT
Start: 2021-07-27 | End: 2021-07-27

## 2021-07-27 RX ORDER — CLINDAMYCIN PHOSPHATE 900 MG/50ML
900 INJECTION, SOLUTION INTRAVENOUS SEE ADMIN INSTRUCTIONS
Status: DISCONTINUED | OUTPATIENT
Start: 2021-07-27 | End: 2021-07-27 | Stop reason: HOSPADM

## 2021-07-27 RX ORDER — ROSUVASTATIN CALCIUM 20 MG/1
10 TABLET, COATED ORAL AT BEDTIME
COMMUNITY

## 2021-07-27 RX ORDER — AMOXICILLIN 250 MG
1 CAPSULE ORAL 2 TIMES DAILY
Status: DISCONTINUED | OUTPATIENT
Start: 2021-07-27 | End: 2021-08-06 | Stop reason: HOSPADM

## 2021-07-27 RX ORDER — LIDOCAINE 40 MG/G
CREAM TOPICAL
Status: DISCONTINUED | OUTPATIENT
Start: 2021-07-27 | End: 2021-07-27

## 2021-07-27 RX ORDER — FENTANYL CITRATE 50 UG/ML
50 INJECTION, SOLUTION INTRAMUSCULAR; INTRAVENOUS EVERY 5 MIN PRN
Status: DISCONTINUED | OUTPATIENT
Start: 2021-07-27 | End: 2021-07-27 | Stop reason: HOSPADM

## 2021-07-27 RX ORDER — CLINDAMYCIN PHOSPHATE 900 MG/50ML
900 INJECTION, SOLUTION INTRAVENOUS
Status: DISCONTINUED | OUTPATIENT
Start: 2021-07-27 | End: 2021-07-27 | Stop reason: HOSPADM

## 2021-07-27 RX ORDER — HYDROXYZINE HYDROCHLORIDE 10 MG/1
10 TABLET, FILM COATED ORAL EVERY 6 HOURS PRN
Status: DISCONTINUED | OUTPATIENT
Start: 2021-07-27 | End: 2021-08-06 | Stop reason: HOSPADM

## 2021-07-27 RX ORDER — GLYCOPYRROLATE 0.2 MG/ML
INJECTION, SOLUTION INTRAMUSCULAR; INTRAVENOUS PRN
Status: DISCONTINUED | OUTPATIENT
Start: 2021-07-27 | End: 2021-07-27

## 2021-07-27 RX ORDER — MAGNESIUM HYDROXIDE 1200 MG/15ML
LIQUID ORAL PRN
Status: DISCONTINUED | OUTPATIENT
Start: 2021-07-27 | End: 2021-07-27 | Stop reason: HOSPADM

## 2021-07-27 RX ORDER — OXYCODONE HYDROCHLORIDE 5 MG/1
5 TABLET ORAL EVERY 4 HOURS PRN
Status: DISCONTINUED | OUTPATIENT
Start: 2021-07-27 | End: 2021-07-27

## 2021-07-27 RX ORDER — ACETAMINOPHEN 650 MG/1
650 SUPPOSITORY RECTAL EVERY 6 HOURS PRN
Status: DISCONTINUED | OUTPATIENT
Start: 2021-07-27 | End: 2021-08-06 | Stop reason: HOSPADM

## 2021-07-27 RX ADMIN — GLYCOPYRROLATE 0.5 MG: 0.2 INJECTION, SOLUTION INTRAMUSCULAR; INTRAVENOUS at 14:24

## 2021-07-27 RX ADMIN — CLINDAMYCIN PHOSPHATE 900 MG: 900 INJECTION, SOLUTION INTRAVENOUS at 13:13

## 2021-07-27 RX ADMIN — DEXAMETHASONE SODIUM PHOSPHATE 4 MG: 4 INJECTION, SOLUTION INTRA-ARTICULAR; INTRALESIONAL; INTRAMUSCULAR; INTRAVENOUS; SOFT TISSUE at 13:35

## 2021-07-27 RX ADMIN — ROCURONIUM BROMIDE 40 MG: 10 INJECTION INTRAVENOUS at 13:08

## 2021-07-27 RX ADMIN — TRANEXAMIC ACID 1 G: 10 INJECTION, SOLUTION INTRAVENOUS at 13:27

## 2021-07-27 RX ADMIN — CLINDAMYCIN PHOSPHATE 900 MG: 900 INJECTION, SOLUTION INTRAVENOUS at 20:06

## 2021-07-27 RX ADMIN — LIDOCAINE HYDROCHLORIDE 60 MG: 20 INJECTION, SOLUTION INFILTRATION; PERINEURAL at 13:08

## 2021-07-27 RX ADMIN — SODIUM CHLORIDE, POTASSIUM CHLORIDE, SODIUM LACTATE AND CALCIUM CHLORIDE: 600; 310; 30; 20 INJECTION, SOLUTION INTRAVENOUS at 13:05

## 2021-07-27 RX ADMIN — FENTANYL CITRATE 25 MCG: 50 INJECTION INTRAMUSCULAR; INTRAVENOUS at 04:08

## 2021-07-27 RX ADMIN — ESMOLOL HYDROCHLORIDE 20 MG: 10 INJECTION, SOLUTION INTRAVENOUS at 14:37

## 2021-07-27 RX ADMIN — SODIUM CHLORIDE, POTASSIUM CHLORIDE, SODIUM LACTATE AND CALCIUM CHLORIDE: 600; 310; 30; 20 INJECTION, SOLUTION INTRAVENOUS at 20:05

## 2021-07-27 RX ADMIN — GLYCOPYRROLATE 0.2 MG: 0.2 INJECTION, SOLUTION INTRAMUSCULAR; INTRAVENOUS at 13:17

## 2021-07-27 RX ADMIN — SODIUM CHLORIDE: 9 INJECTION, SOLUTION INTRAVENOUS at 08:31

## 2021-07-27 RX ADMIN — ONDANSETRON 4 MG: 2 INJECTION INTRAMUSCULAR; INTRAVENOUS at 06:57

## 2021-07-27 RX ADMIN — ESMOLOL HYDROCHLORIDE 30 MG: 10 INJECTION, SOLUTION INTRAVENOUS at 14:34

## 2021-07-27 RX ADMIN — NEOSTIGMINE METHYLSULFATE 3.5 MG: 1 INJECTION, SOLUTION INTRAVENOUS at 14:24

## 2021-07-27 RX ADMIN — TRANEXAMIC ACID 1 G: 10 INJECTION, SOLUTION INTRAVENOUS at 14:14

## 2021-07-27 RX ADMIN — HYDROMORPHONE HYDROCHLORIDE 0.2 MG: 1 INJECTION, SOLUTION INTRAMUSCULAR; INTRAVENOUS; SUBCUTANEOUS at 06:57

## 2021-07-27 RX ADMIN — FENTANYL CITRATE 50 MCG: 50 INJECTION, SOLUTION INTRAMUSCULAR; INTRAVENOUS at 13:08

## 2021-07-27 RX ADMIN — PROPOFOL 50 MG: 10 INJECTION, EMULSION INTRAVENOUS at 13:08

## 2021-07-27 RX ADMIN — FENTANYL CITRATE 50 MCG: 50 INJECTION, SOLUTION INTRAMUSCULAR; INTRAVENOUS at 13:41

## 2021-07-27 RX ADMIN — ONDANSETRON 4 MG: 2 INJECTION INTRAMUSCULAR; INTRAVENOUS at 14:22

## 2021-07-27 RX ADMIN — ASPIRIN 325 MG: 325 TABLET, COATED ORAL at 20:06

## 2021-07-27 RX ADMIN — INSULIN ASPART 1 UNITS: 100 INJECTION, SOLUTION INTRAVENOUS; SUBCUTANEOUS at 20:43

## 2021-07-27 RX ADMIN — FENTANYL CITRATE 25 MCG: 50 INJECTION, SOLUTION INTRAMUSCULAR; INTRAVENOUS at 12:20

## 2021-07-27 ASSESSMENT — ACTIVITIES OF DAILY LIVING (ADL): ADLS_ACUITY_SCORE: 13

## 2021-07-27 ASSESSMENT — LIFESTYLE VARIABLES: TOBACCO_USE: 0

## 2021-07-27 NOTE — PLAN OF CARE
Summary: OPEN REDUCTION INTERNAL FIXATION LEFT FEMUR FRACTURE.  Date/Time 07/27/2021, 5004-1783    Trauma/Ortho/Medical: Ortho    Diagnosis: Left Hip/Femur fracture  POD#:0  Mental Status:Alert to self only   Activity/dangle: Strict bedrest   Diet:NPO  Pain: Distractions minimized, IV dilaudid in ED  Perrin/Voiding:Perrin in place, No BM   Tele/Restraints/Iso:N/A  02/LDA: 2L NC  D/C Date:Pending  Other Info: Patient got here at 0815, was very fearful, restless, and agitated. Pt was combative at times. Pt calmed down after about 20 min. Pt was sent down for surgery @1100.

## 2021-07-27 NOTE — ANESTHESIA PROCEDURE NOTES
Fascia iliaca Procedure Note  Pre-Procedure   Staff -        Anesthesiologist:  Kane Blount MD       Performed By: anesthesiologist       Location: pre-op       Pre-Anesthestic Checklist: patient identified, IV checked, site marked, risks and benefits discussed, informed consent, monitors and equipment checked, pre-op evaluation, at physician/surgeon's request and post-op pain management  Timeout:       Correct Patient: Yes        Correct Procedure: Yes        Correct Site: Yes        Correct Position: Yes        Correct Laterality: Yes        Site Marked: Yes  Procedure Documentation  Procedure: Fascia iliaca       Patient Position: supine       Skin prep: Chloraprep       Local skin infiltrated with 1 mL of 1% lidocaine.        Needle Type: insulated       Needle Gauge: 21.        Needle Length (millimeters): 90        Ultrasound guided       1. Ultrasound was used to identify targeted nerve, plexus, vascular marker, or fascial plane and place a needle adjacent to it in real-time.       2. Ultrasound was used to visualize the spread of anesthetic in close proximity to the above referenced structure.       3. A permanent image is entered into the patient's record.       4. The visualized anatomic structures appeared normal.       5. There were no apparent abnormal pathologic findings.    Assessment/Narrative         The placement was negative for: blood aspirated, painful injection and site bleeding       Paresthesias: No.     Bolus given via needle..        Secured via.        Insertion/Infusion Method: Single Shot       Complications: none    Comments:  Bolus via needle, 20 ml of 0.5% ropivacaine with 1:400,000 epinephrine.    Under ultrasound guidance, a 21 gauge needle was inserted and placed in the fascia iliaca plane in close proximity to the femoral nerve. Ultrasound was also used to visualize the spread of anesthetic in close proximity to the nerve being blocked. The nerve appeared anatomically  normal, and there were no apparent abnormal pathological findings. Patient tolerated well, was mildly sedated but communicative throughout the procedure. A permanent ultrasound image was saved in the patient's record.    The surgeon has given a verbal order transferring care of this patient to me for the performance of regional analgesia block for post op pain control. It is requested of me because I am uniquely trained and qualified to perform this block and the surgeon is neither trained nor qualified to perform this procedure.

## 2021-07-27 NOTE — ED TRIAGE NOTES
Pt had unwitnessed fall earlier tonight. Pt had to crawl to the phone. Pt c/o left hip pain. Pt has hx of dementia

## 2021-07-27 NOTE — ED PROVIDER NOTES
History   Chief Complaint:  Fall     The history is provided by the EMS personnel. History limited by: franci historian.    History supplemented by electronic chart review    Eri Puckett is a 89 year old female with history of diabetes mellitus type 2 and hypertension who presents with fall. EMS reports patient hand an unwitnessed fall earlier tonight. She crawled to her phone and called family members, who called EMS. Patient was able to pull herself up into a wheelchair before EMS arrived. She reports her left hip feels out of place and has a hard time tolerating any weight, pressure, or movement of her left hip. She has no recent history of illness or infection and has no history of a previous broken hip or hip surgery. Patient's family says she has dementia at baseline and lives independently. Patient's blood sugar was 227 and was given 50 of Fentanyl en route via EMS. Patient's daughter denies any previous broken hips or hip surgeries. Her daughter also notes she has been feeling normal otherwise.       Review of Systems   Reason unable to perform ROS: franci historian.     Allergies:  Cephalexin  Cephalosporins  Ezatimibe    Medications:  Allopurinol  Caltrate  Vitamin D3  Fluoxentine HCl  Tradjenta  Lisinopril  Omeprazole  Crestor  Albuterol sulfate HFA  Dulcolax  Imodium  Diphenhydramine-APAP  Prilosec  Aricept  Zyloprim  Norvasc  Prozac    Past Medical History:    Asthma  Diabetes mellitus type 2  Contusion of right hip  Fuchs' corneal dystrophy with mild edema left eye  Mature cataract right eye  Closed fracture of ramus of right pubis  Generalized weakness  Closed fracture of right wrist  Monocular exotropia of right eye, sensory  Primary open angle glaucoma of left eye, severe stage  Ocular hypertension of right eye  Closed fracture of lower end of right radius  Major neurocognitive disorder due to Alzheimer's disease, probable, without behavioral disturbance  Acute blood loss anemia  CKD, stage  3  Anatomical narrow angle of both eyes  Cystoid macular edema of left eye  Pseudophakia, left eye  Mild aortic stenosis   Choroidal hemorrhage of left eye/intra op  Primary osteoarthritis of left knee  Gout  Degenerative arthritis of cervical spine  Diabetic polyneuropathy  Basal cell carcinoma  Lymphoma  Dysthymic disorder  Essential hypertension  Mixed hyperlipidemia  Corneal epithelial defect  Total or mature senile cataract  Tumor lysis syndrome  Legally blind  Arthritis  Stomach cancer  Head, face, and neck injury  Age-related nuclear cataract of both eyes    Past Surgical History:    MOHS surgery; 1 stage fresh tiss up 5  Extracapsular cataract removal IOL left x 2    Family History:    Cataract  Glaucoma  Macular degeneration  Breast cancer  Lung cancer  Diabetes    Social History:  Patient presents with daughter via EMS.    Physical Exam     Patient Vitals for the past 24 hrs:   BP Temp Temp src Pulse Resp SpO2   07/27/21 0400 -- -- -- -- -- 100 %   07/27/21 0149 (!) 183/80 98.6  F (37  C) Oral 60 16 96 %       Physical Exam  General: Woman recumbent in room 25, family later bedside  HENT: MMM, no signs of facial trauma  CV:  regular rhythm, soft compartments in both legs, cap refill normal  Resp: normal effort, speaks in full phrases, no stridor, no cough observed  GI: abdomen soft,  nontender  MSK:  Spine: nontender  Extremities: Internal rotation of left hip with shortening of left leg, extreme pain with attempts to move left hip.  Remainder of left lower extremity is nontender.  Pelvis is stable.  Chest wall is completely nontender bilaterally  Full range of motion of neck  Skin:   No abrasion  No ecchymosis  No laceration  Neuro: awake, alert, poor historian with incomplete recall of events from earlier today, responds appropriately to commands, SILT all extremities  Psych: cooperative    Emergency Department Course   ECG  ECG taken at 0221, ECG read at 0226  Sinus rhythm  Interior infarct, age  undetermined  Anterior infarct, age undetermined   Rate 74 bpm. PA interval 268 ms. QRS duration 94 ms. QT/QTc 440/488 ms. P-R-T axes 73 -16 76.     Imaging:  XR Pelvis and Hip Left 1 View  Comminuted and angulated left intertrochanteric hip fracture.  Per radiology    XR Chest 1 View  Age-indeterminate right eighth rib fracture. No pneumothorax.  Per radiology    CT Head w/o Contrast  1.  No acute intracranial process.  Per radiology    Laboratory:  Asymptomatic COVID: Negative  CMP: Calcium 8.4 (L), Glucose 207 (H), Protein Total 5.8 (L), Albumin 3.0 (L), GFR Estimate o/w WNL (Creatinine 0.90)   Troponin (Collected 0155): <0.015  CBC: WBC 11.4 (H), HGB 13.1,      Emergency Department Course:    Reviewed:  I reviewed nursing notes, vitals, past medical history and care everywhere    Assessments:  0144 I obtained history and examined the patient as noted above.   0329 I rechecked the patient and explained findings.     Consults:   0359 I spoke with Dr. Rushing of the hospitalist service regarding patient's presentation, findings, and plan of care. They accepted the patient for admission.     Interventions:  0408 Fentanyl 25 mcg IV    Disposition:  The patient was admitted to the hospital under the care of Dr. Infante.       Impression & Plan   medical Decision Making:  She has unfortunately sustained a significant left hip fracture, presumably from a fall from standing, whose etiology is unconfirmed.  She became slightly hypoxic after some fentanyl from EMS, so additional opioids were initially withheld.  Given her dementia and advanced age, additional medical work-up and CT of the head was performed with results as above.  No neck pain or tenderness to suggest the need for cervical spine imaging.  She requires hospitalization for further multidisciplinary care including orthopedic consultation, with expectation for surgical intervention of her left hip while hospitalized.  She was admitted to the hospital  service after discussion of the above.    Covid-19  Eri Puckett was evaluated during a global COVID-19 pandemic, which necessitated consideration that the patient might be at risk for infection with the SARS-CoV-2 virus that causes COVID-19.   Applicable protocols for evaluation were followed during the patient's care.   COVID-19 was considered as part of the patient's evaluation. The plan for testing is:  a test was obtained during this visit.    Diagnosis:    ICD-10-CM    1. Hip fracture, left, closed, initial encounter (H)  S72.002A    2. Dementia without behavioral disturbance, unspecified dementia type (H)  F03.90    3. Abnormal chest x-ray  R93.89     likely old fx; no pain/tenderness today         Scribe Disclosure:  JAJA, TIM ARRIAZA, am serving as a scribe at 1:44 AM on 7/27/2021 to document services personally performed by Rolly Womack MD based on my observations and the provider's statements to me.     This note was completed in part using Dragon voice recognition software. Although reviewed after completion, some word and grammatical errors may occur.         Michael Womack MD  07/27/21 0845

## 2021-07-27 NOTE — CONSULTS
[facility]    Orthopedic Consultation    Eri Puckett MRN# [id number]   Age: 89 year old YOB: 1928     Date of Admission:  7/27/2021    Reason for consult: Acute left hip fracture       Requesting physician: Dr. Rushing       Level of consult: Consult, follow and place orders           Assessment and Plan:   Assessment:   Left intertrochanteric femur fracture, fall from standing      Plan:   OR today 7/27 for left hip DHS with Dr. Barrett  NPO   Bed rest  Prefer pure wick over trujillo if at all possible  Pain medication as needed, limit narcotics as able  STAT COVID test  Type and cross  Vit D lab  Pre-op optimization per hospitalist             Chief Complaint:   Left hip fracture         History of Present Illness:   This patient is a 89 year old female who presents with the following condition requiring a hospital admission:    Per ED note: EMS reports patient hand an unwitnessed fall earlier tonight. She crawled to her phone and called family members, who called EMS. Patient was able to pull herself up into a wheelchair before EMS arrived. She reports her left hip feels out of place and has a hard time tolerating any weight, pressure, or movement of her left hip. She has no recent history of illness or infection and has no history of a previous broken hip or hip surgery. Patient's family says she has dementia at baseline and lives independently. Patient's blood sugar was 227 and was given 50 of Fentanyl en route via EMS. Patient's daughter denies any previous broken hips or hip surgeries. Her daughter also notes she has been feeling normal otherwise.   She is not chronically anticoagulated.          Past Medical History:     Past Medical History:   Diagnosis Date     Alzheimer's dementia (H)      Asthma     mild  2013     Cancer (H)     hodkins lymphoma 2009 with recurent now     Diabetes mellitus     hx type 2 40 years     Dyslipidemia      Dysthymia      Esophageal  reflux      Fuchs' corneal dystrophy      Gout      HTN (hypertension)      Peripheral neuropathy      Pubic ramus fracture (H) 2019     Right wrist fracture 2017     Thrombocytopenia (H)              Past Surgical History:     Past Surgical History:   Procedure Laterality Date     CHOLECYSTECTOMY OPEN       EYE SURGERY       MOHS MICROGRAPHIC PROCEDURE       TOTAL KNEE ARTHROPLASTY Right              Social History:     Social History     Tobacco Use     Smoking status: Never Smoker     Smokeless tobacco: Never Used   Substance Use Topics     Alcohol use: No             Family History:     Family History   Problem Relation Age of Onset     Glaucoma Father              Immunizations:     VACCINE/DOSE   Diptheria   DPT   DTAP   HBIG   Hepatitis A   Hepatitis B   HIB   Influenza   Measles   Meningococcal   MMR   Mumps   Pneumococcal   Polio   Rubella   Small Pox   TDAP   Varicella   Zoster             Allergies:     Allergies   Allergen Reactions     Cephalexin Rash             Medications:     Current Facility-Administered Medications   Medication     acetaminophen (TYLENOL) tablet 650 mg    Or     acetaminophen (TYLENOL) Suppository 650 mg     bisacodyl (DULCOLAX) Suppository 10 mg     glucose gel 15-30 g    Or     dextrose 50 % injection 25-50 mL    Or     glucagon injection 1 mg     HYDROmorphone (PF) (DILAUDID) injection 0.2 mg     insulin aspart (NovoLOG) injection (RAPID ACTING)     lidocaine (LMX4) cream     lidocaine 1 % 0.1-1 mL     melatonin tablet 5 mg     naloxone (NARCAN) injection 0.2 mg    Or     naloxone (NARCAN) injection 0.4 mg    Or     naloxone (NARCAN) injection 0.2 mg    Or     naloxone (NARCAN) injection 0.4 mg     ondansetron (ZOFRAN-ODT) ODT tab 4 mg    Or     ondansetron (ZOFRAN) injection 4 mg     oxyCODONE (ROXICODONE) tablet 5 mg     polyethylene glycol (MIRALAX) Packet 17 g     sodium chloride (PF) 0.9% PF flush 3 mL     sodium chloride (PF) 0.9% PF flush 3 mL     sodium chloride 0.9%  infusion             Review of Systems:   CV: NEGATIVE for chest pain, palpitations or peripheral edema  C: NEGATIVE for fever, chills, change in weight  E/M: NEGATIVE for ear, mouth and throat problems  R: NEGATIVE for significant cough or SOB          Physical Exam:   All vitals have been reviewed  Patient Vitals for the past 24 hrs:   BP Temp Temp src Pulse Resp SpO2   07/27/21 0814 (!) 167/90 97.8  F (36.6  C) Axillary 80 18 99 %   07/27/21 0750 -- -- -- -- -- 98 %   07/27/21 0740 -- -- -- -- -- 99 %   07/27/21 0702 -- -- -- -- -- 97 %   07/27/21 0701 126/66 -- -- 56 -- 98 %   07/27/21 0529 -- -- -- -- -- 99 %   07/27/21 0425 133/83 -- -- 82 -- 98 %   07/27/21 0400 -- -- -- -- -- 100 %   07/27/21 0149 (!) 183/80 98.6  F (37  C) Oral 60 16 96 %     No intake or output data in the 24 hours ending 07/27/21 0920  Patient laying comfortably in bed  Left LE shortened and internally rotated  Minimal erythema of the surrounding skin.   Bilateral calves are soft, non-tender.  Bilateral lower extremity is NVI.  Sensation intact bilateral lower extremities  5/5 motor with resisted dorsi and plantar flexion bilaterally  +Dp pulse          Data:   All laboratory data reviewed  Results for orders placed or performed during the hospital encounter of 07/27/21   XR Pelvis and Hip Left 1 View     Status: None    Narrative    EXAM: XR PELVIS AND HIP LEFT 1 VIEW  LOCATION: [facility]  DATE/TIME: 7/27/2021 2:35 AM    INDICATION: fall, pain/deformity of L hip  COMPARISON: None.      Impression    IMPRESSION: Comminuted and angulated left intertrochanteric hip fracture.   XR Chest 1 View     Status: None    Narrative    EXAM: XR CHEST 1 VIEW  LOCATION: [facility]  DATE/TIME: 7/27/2021 2:45 AM    INDICATION: Fall. Hip fracture.  COMPARISON: 6/3/2015.    FINDINGS: The heart size is normal. The thoracic aorta is calcified. The lungs are clear. Age-indeterminate right posterolateral  eighth rib fracture. No pneumothorax.      Impression    IMPRESSION: Age-indeterminate right eighth rib fracture. No pneumothorax.   CT Head w/o Contrast     Status: None    Narrative    EXAM: CT HEAD W/O CONTRAST  LOCATION: [facility]  DATE/TIME: 7/27/2021 2:43 AM    INDICATION: Head injury  COMPARISON: 04/02/2019  TECHNIQUE: Routine CT Head without IV contrast. Multiplanar reformats. Dose reduction techniques were used.    FINDINGS:  INTRACRANIAL CONTENTS: No intracranial hemorrhage, extraaxial collection, or mass effect.  No CT evidence of acute infarct. Moderate presumed chronic small vessel ischemic changes. Moderate generalized volume loss. No hydrocephalus.     VISUALIZED ORBITS/SINUSES/MASTOIDS: No intraorbital abnormality. No paranasal sinus mucosal disease. No middle ear or mastoid effusion.    BONES/SOFT TISSUES: No acute abnormality.      Impression    IMPRESSION:  1.  No acute intracranial process.   Extra Blue Top Tube     Status: None   Result Value Ref Range    Hold Specimen JIC    Extra Red Top Tube     Status: None   Result Value Ref Range    Hold Specimen JIC    Extra Green Top (Lithium Heparin) Tube     Status: None   Result Value Ref Range    Hold Specimen JIC    Extra Purple Top Tube     Status: None   Result Value Ref Range    Hold Specimen     Comprehensive metabolic panel     Status: Abnormal   Result Value Ref Range    Sodium 141 133 - 144 mmol/L    Potassium 3.7 3.4 - 5.3 mmol/L    Chloride 108 94 - 109 mmol/L    Carbon Dioxide (CO2) 30 20 - 32 mmol/L    Anion Gap 3 3 - 14 mmol/L    Urea Nitrogen 15 7 - 30 mg/dL    Creatinine 0.90 0.52 - 1.04 mg/dL    Calcium 8.4 (L) 8.5 - 10.1 mg/dL    Glucose 207 (H) 70 - 99 mg/dL    Alkaline Phosphatase 51 40 - 150 U/L    AST 23 0 - 45 U/L    ALT 23 0 - 50 U/L    Protein Total 5.8 (L) 6.8 - 8.8 g/dL    Albumin 3.0 (L) 3.4 - 5.0 g/dL    Bilirubin Total 0.4 0.2 - 1.3 mg/dL    GFR Estimate 55 (L) >60 mL/min/1.73m2   Troponin I      Status: Normal   Result Value Ref Range    Troponin I <0.015 0.000 - 0.045 ug/L   CBC with platelets and differential     Status: Abnormal   Result Value Ref Range    WBC Count 11.4 (H) 4.0 - 11.0 10e3/uL    RBC Count 4.66 3.80 - 5.20 10e6/uL    Hemoglobin 13.1 11.7 - 15.7 g/dL    Hematocrit 40.9 35.0 - 47.0 %    MCV 88 78 - 100 fL    MCH 28.1 26.5 - 33.0 pg    MCHC 32.0 31.5 - 36.5 g/dL    RDW 12.6 10.0 - 15.0 %    Platelet Count 154 150 - 450 10e3/uL    % Neutrophils 89 %    % Lymphocytes 6 %    % Monocytes 5 %    % Eosinophils 0 %    % Basophils 0 %    % Immature Granulocytes 0 %    NRBCs per 100 WBC 0 <1 /100    Absolute Neutrophils 10.0 (H) 1.6 - 8.3 10e3/uL    Absolute Lymphocytes 0.7 (L) 0.8 - 5.3 10e3/uL    Absolute Monocytes 0.5 0.0 - 1.3 10e3/uL    Absolute Eosinophils 0.0 0.0 - 0.7 10e3/uL    Absolute Basophils 0.0 0.0 - 0.2 10e3/uL    Absolute Immature Granulocytes 0.1 (H) <=0.0 10e3/uL    Absolute NRBCs 0.0 10e3/uL   SARS-COV2 (COVID-19) Virus RT-PCR     Status: Normal    Specimen: Nasopharyngeal; Swab   Result Value Ref Range    SARS CoV2 PCR Negative Negative    Narrative    Testing was performed using the bryan  SARS-CoV-2 & Influenza A/B Assay on the bryan  Moon  System.  This test should be ordered for the detection of SARS-COV-2 in individuals who meet SARS-CoV-2 clinical and/or epidemiological criteria. Test performance is unknown in asymptomatic patients.  This test is for in vitro diagnostic use under the FDA EUA for laboratories certified under CLIA to perform moderate and/or high complexity testing. This test has not been FDA cleared or approved.  A negative test does not rule out the presence of PCR inhibitors in the specimen or target RNA in concentration below the limit of detection for the assay. The possibility of a false negative should be considered if the patient's recent exposure or clinical presentation suggests COVID-19.  [facility] are certified under the  Clinical Laboratory Improvement Amendments of 1988 (CLIA-88) as qualified to perform moderate and/or high complexity laboratory testing.   EKG 12-lead, tracing only     Status: None   Result Value Ref Range    Systolic Blood Pressure  mmHg    Diastolic Blood Pressure  mmHg    Ventricular Rate 74 BPM    Atrial Rate 74 BPM    WA Interval 268 ms    QRS Duration 94 ms     ms    QTc 488 ms    P Axis 73 degrees    R AXIS -16 degrees    T Axis 76 degrees    Interpretation ECG       Sinus rhythm with sinus arrhythmia with 1st degree A-V block  Inferior infarct (cited on or before 11-OCT-2017)  Anterior infarct (cited on or before 11-OCT-2017)  Abnormal ECG  When compared with ECG of 11-OCT-2017 07:16,  Premature atrial complexes are no longer Present  T wave amplitude has decreased in Inferior leads  Confirmed by GENERATED REPORT, COMPUTER ([id number]),  Vonda Blair ([id number]) on 7/27/2021 2:25:06 AM     Asymptomatic COVID-19 Virus (Coronavirus) by PCR Nasopharyngeal     Status: Normal    Specimen: Nasopharyngeal; Swab    Narrative    The following orders were created for panel order Asymptomatic COVID-19 Virus (Coronavirus) by PCR Nasopharyngeal.  Procedure                               Abnormality         Status                     ---------                               -----------         ------                     SARS-COV2 (COVID-19) Vir...[[id number]]  Normal              Final result                 Please view results for these tests on the individual orders.   [place] Draw     Status: None (In process)    Narrative    The following orders were created for panel order [place] Draw.  Procedure                               Abnormality         Status                     ---------                               -----------         ------                     Extra Blue Top Tube[[id number]]                              Final result               Extra Red Top Tube[[id number]]                               Final result                Extra Green Top (Lithium...[[id number]]                      Final result               Extra Purple Top Tube[[id number]]                            Final result               Extra Blood Bank Purple ...[[id number]]                      In process                   Please view results for these tests on the individual orders.   CBC with platelets differential     Status: Abnormal    Narrative    The following orders were created for panel order CBC with platelets differential.  Procedure                               Abnormality         Status                     ---------                               -----------         ------                     CBC with platelets and d...[[id number]]  Abnormal            Final result                 Please view results for these tests on the individual orders.          Attestation:  I have reviewed today's vital signs, notes, medications, labs and imaging with Dr. Barrett.  Amount of time performed on this consult: 30 minutes.    Jacinta Colorado PA-C

## 2021-07-27 NOTE — ANESTHESIA CARE TRANSFER NOTE
Patient: Eri Puckett    Procedure(s):  OPEN REDUCTION INTERNAL FIXATION LEFT FEMUR FRACTURE.    Diagnosis: Femur fracture (H) [S72.90XA]  Diagnosis Additional Information: No value filed.    Anesthesia Type:   General     Note:      Level of Consciousness: awake  Oxygen Supplementation: face mask    Independent Airway: airway patency satisfactory and stable        Patient transferred to: PACU  Comments: Neuromuscular blockade reversed after TOF 4/4, spontaneous respirations, adequate tidal volumes, followed commands to voice, oropharynx suctioned with soft flexible catheter, extubated atraumatically, extubated with suction, airway patent after extubation.  Oxygen via facemask at 8 liters per minute to PACU. Oxygen tubing connected to wall O2 in PACU, SpO2, NiBP, and EKG monitors and alarms on and functioning, Yoly Hugger warmer connected to patient gown, report on patient's clinical status given to PACU RN, RN questions answered.     Handoff Report: Identifed the Patient, Identified the Reponsible Provider, Reviewed the pertinent medical history, Discussed the surgical course, Reviewed Intra-OP anesthesia mangement and issues during anesthesia, Set expectations for post-procedure period and Allowed opportunity for questions and acknowledgement of understanding      Vitals:  Vitals Value Taken Time   BP     Temp     Pulse     Resp     SpO2         Electronically Signed By: KNETON Zhou CRNA  July 27, 2021  2:39 PM

## 2021-07-27 NOTE — ED NOTES
Bed: ED25  Expected date: 7/27/21  Expected time: 1:42 AM  Means of arrival: Ambulance  Comments:  966- [phone] F.  Hip fx.

## 2021-07-27 NOTE — ANESTHESIA PROCEDURE NOTES
Airway       Patient location during procedure: OR       Procedure Start/Stop Times: 7/27/2021 1:12 PM  Staff -        CRNA: Radha Brandon APRN CRNA       Performed By: CRNA  Consent for Airway        Urgency: elective  Indications and Patient Condition       Indications for airway management: myrna-procedural         Mask difficulty assessment: 1 - vent by mask    Final Airway Details       Final airway type: endotracheal airway       Successful airway: ETT - single  Endotracheal Airway Details        ETT size (mm): 7.0       Cuffed: yes       Cuff volume (mL): 10       Successful intubation technique: video laryngoscopy       VL Blade Size: Glidescope 3       Grade View of Cords: 1       Adjucts: stylet       Position: Right       Measured from: gums/teeth       Secured at (cm): 21       Bite block used: None    Post intubation assessment        Placement verified by: capnometry, equal breath sounds and chest rise        Number of attempts at approach: 1       Secured with: pink tape       Ease of procedure: easy       Dentition: Intact and Unchanged

## 2021-07-27 NOTE — OP NOTE
Procedure Date: 07/27/2021    PREOPERATIVE DIAGNOSIS:  Left intertrochanteric hip fracture.    POSTOPERATIVE DIAGNOSIS:  Left intertrochanteric hip fracture.    PROCEDURE PERFORMED:  Open reduction and internal fixation of the left hip using a 135-degree 4-hole compression hip screw.    INDICATIONS FOR PROCEDURE:  The patient is a 89-year-old female who fell earlier today and sustained the above stated injury.  She is a minimal ambulator.  She ambulates with a walker.  She is otherwise relatively healthy.  She does have a cardiac history, but has been cleared by Cardiology for surgery.  I discussed the difficult nature of this problem with the patient and her family and explained the risks, benefits, and potential complications of both nonoperative and operative treatment and recommended the above stated procedure.  The discussion of surgery included, but was not specific to infection, nonunion, malunion, and the possible need for further revision surgery along with DVT and vascular, neurologic complications.    ANESTHETIC:  General.    ASSISTANT:  Moises Carrera PA-C    DESCRIPTION OF PROCEDURE:  The patient was taken to the operating room and placed on the table in supine position.  After adequate induction of general anesthetic, placed on the [place] fracture table.  Care was taken to pad all bony prominences.  Longitudinal traction was applied to the left leg.  C-arm was brought in and we surveyed the fracture in both AP and lateral planes.  We had a near anatomic reduction.  The patient was given 2 grams of Ancef for infection prophylaxis given 1 hour prior to incision.  We then performed sterile prep and drape of the left hip and left lower extremity.  After sterile prep and drape, incision was made.  I divided the tensor fascia in line with the fibers, retracted the vastus lateralis anteriorly and did a subperiosteal exposure of the proximal femur laterally.  I then placed a guidewire in the 2, 2 position of  the femoral head and then drilled and placed the appropriate length screws to fill the plate and the compression screw was placed in the 2, 2 position.  This was verified using C-arm.  Upon completion, we checked the fracture construct and it was found to be very good.  There was no evidence of fracture beyond the fracture construct.  There was no evidence of screw penetration into the joint.  We then irrigated, and repaired the vastus lateralis using 0 Vicryl, closed the tensor fascia using 0 Vicryl, deep subQ was closed using 0 Vicryl, superficial subQ was closed with 2-0 Vicryl, skin was closed with staples.  In each layer of closure, the wound was copiously irrigated with antibiotic saline.  Sterile dressing was applied.  The patient tolerated the operative procedure.  There were no intraoperative complications.  The patient went to the PAR in stable condition.    Plan will be for patient to be partial weightbearing with a walker for the next 6 weeks.  She will get 24 hours of Ancef for infection prophylaxis, 30 days of aspirin for DVT prophylaxis.      Nella Barrett MD        D: 2021   T: 2021   MT: [id number]    Name:     SEGUN KELLY  MRN:      [id number]        Account:        [id number]   :      1928           Procedure Date: 2021     Document: [id number]

## 2021-07-27 NOTE — PROVIDER NOTIFICATION
MD Notification    Notified Person: MD    Notified Person Name: Dr. Dupont    Notification Date/Time: 07/27/21 10:19 AM    Notification Interaction: Text Page     Purpose of Notification:   Pre-op called, pt was added to surgery schedule at 12:00. Pre-op wants to make sure she is medically cleared. Do you want to see her before she goes down?     Orders Received:    Comments:

## 2021-07-27 NOTE — ANESTHESIA PREPROCEDURE EVALUATION
Anesthesia Pre-Procedure Evaluation    Patient: Eri Puckett   MRN: [id number] : 1928        Preoperative Diagnosis: Femur fracture (H) [S72.90XA]   Procedure : Procedure(s):  OPEN REDUCTION INTERNAL FIXATION LEFT FEMUR FRACTURE.     Past Medical History:   Diagnosis Date     Alzheimer's dementia (H)      Asthma     mild  2013     Cancer (H)     hodkins lymphoma 2009 with recurent now     Diabetes mellitus     hx type 2 40 years     Dyslipidemia      Dysthymia      Esophageal reflux      Fuchs' corneal dystrophy      Gout      HTN (hypertension)      Peripheral neuropathy      Pubic ramus fracture (H) 2019     Right wrist fracture 2017     Thrombocytopenia (H)       Past Surgical History:   Procedure Laterality Date     CHOLECYSTECTOMY OPEN       EYE SURGERY       MOHS MICROGRAPHIC PROCEDURE       TOTAL KNEE ARTHROPLASTY Right       Allergies   Allergen Reactions     Cephalexin Rash      Social History     Tobacco Use     Smoking status: Never Smoker     Smokeless tobacco: Never Used   Substance Use Topics     Alcohol use: No      Wt Readings from Last 1 Encounters:   19 67.6 kg (149 lb)        Anesthesia Evaluation   Pt has had prior anesthetic. Type: General.    No history of anesthetic complications       ROS/MED HX  ENT/Pulmonary:     (+) asthma  (-) tobacco use   Neurologic:     (+) dementia, peripheral neuropathy, - Peripheral neuropathy.     Cardiovascular:     (+) Dyslipidemia hypertension-----valvular problems/murmurs type: AS Mod AS.     METS/Exercise Tolerance:     Hematologic: Comments: Thrombocytopenia      Musculoskeletal:       GI/Hepatic:     (+) GERD,     Renal/Genitourinary:       Endo:     (+) type II DM,     Psychiatric/Substance Use:     (+) psychiatric history anxiety and depression     Infectious Disease:       Malignancy:   (+) Malignancy, History of Lymphoma/Leukemia.    Other:            Physical Exam    Airway        Mallampati: I   TM distance: > 3 FB   Neck ROM: full    Mouth opening: > 3 cm    Respiratory Devices and Support         Dental       (+) chipped    B=Bridge, C=Chipped, L=Loose, M=Missing    Cardiovascular          Rhythm and rate: regular and normal     Pulmonary           breath sounds clear to auscultation           OUTSIDE LABS:  CBC:   Lab Results   Component Value Date    WBC 11.4 (H) 07/27/2021    WBC 7.1 04/02/2019    HGB 13.1 07/27/2021    HGB 13.1 04/02/2019    HCT 40.9 07/27/2021    HCT 39.8 04/02/2019     07/27/2021     (L) 04/02/2019     BMP:   Lab Results   Component Value Date     07/27/2021     04/02/2019    POTASSIUM 3.7 07/27/2021    POTASSIUM 4.0 04/02/2019    CHLORIDE 108 07/27/2021    CHLORIDE 107 04/02/2019    CO2 30 07/27/2021    CO2 30 04/02/2019    BUN 15 07/27/2021    BUN 10 04/02/2019    CR 0.90 07/27/2021    CR 0.92 04/02/2019     (H) 07/27/2021     (H) 07/27/2021     COAGS:   Lab Results   Component Value Date    PTT 50 (H) 12/22/2007    INR 0.98 06/03/2015     POC:   Lab Results   Component Value Date     (H) 10/14/2017     HEPATIC:   Lab Results   Component Value Date    ALBUMIN 3.0 (L) 07/27/2021    PROTTOTAL 5.8 (L) 07/27/2021    ALT 23 07/27/2021    AST 23 07/27/2021    ALKPHOS 51 07/27/2021    BILITOTAL 0.4 07/27/2021     OTHER:   Lab Results   Component Value Date    A1C 6.8 (H) 10/12/2017    GARRETT 8.4 (L) 07/27/2021    PHOS 3.1 01/10/2008    MAG 2.0 01/10/2008    LIPASE 131 10/11/2017       Anesthesia Plan    ASA Status:  3      Anesthesia Type: General.   Induction: Intravenous.   Maintenance: Balanced.        Consents    Anesthesia Plan(s) and associated risks, benefits, and realistic alternatives discussed. Questions answered and patient/representative(s) expressed understanding.     - Discussed with:  Patient         Postoperative Care    Pain management: IV analgesics, Multi-modal analgesia, Peripheral nerve block (Single Shot).   PONV prophylaxis: Ondansetron (or other 5HT-3)      Comments:                Kane Blount MD

## 2021-07-27 NOTE — ED NOTES
[facility]  ED Nurse Handoff Report    ED Chief complaint: Fall and Hip Pain      ED Diagnosis:   Final diagnoses:   Hip fracture, left, closed, initial encounter (H)   Dementia without behavioral disturbance, unspecified dementia type (H)   Abnormal chest x-ray - likely old fx; no pain/tenderness today       Code Status: MD to address    Allergies:   Allergies   Allergen Reactions     Cephalexin Rash       Patient Story: Pt had unwitnessed fall earlier tonight. Pt had to crawl to the phone. Pt c/o left hip pain. Pt has hx of dementia. Pt lives in a senior living  Focused Assessment:  Left hip pain    Treatments and/or interventions provided: see MAR  Patient's response to treatments and/or interventions: pain better    To be done/followed up on inpatient unit:      Does this patient have any cognitive concerns?: Baseline dementia    Activity level - Baseline/Home:  Walker  Activity Level - Current:   Total Care    Patient's Preferred language: English   Needed?: No    Isolation: None  Infection: Not Applicable  Patient tested for COVID 19 prior to admission: YES  Bariatric?: No    Vital Signs:   Vitals:    07/27/21 0149 07/27/21 0400   BP: (!) 183/80    Pulse: 60    Resp: 16    Temp: 98.6  F (37  C)    TempSrc: Oral    SpO2: 96% 100%       Cardiac Rhythm:     Was the PSS-3 completed:   Yes  What interventions are required if any?               Family Comments:   OBS brochure/video discussed/provided to patient/family: Yes              Name of person given brochure if not patient:               Relationship to patient:     For the majority of the shift this patient's behavior was Green.   Behavioral interventions performed were .    ED NURSE PHONE NUMBER: [phone]

## 2021-07-27 NOTE — H&P
[facility]    History and Physical  Hospitalist       Date of Admission:  7/27/2021  Date of Service (when I saw the patient): 07/27/21    ASSESSMENT  Eri Puckett is a markedly pleasant 89 year old woman with past medical history that is most notable for advanced chronic dementia, as well as Type 2 Diabetes mellitus and Hodgkins Lymphoma, among others; who presents with mechanical fall causing acute left intertrochanteric hip fracture.    PLAN    Mechanical fall causing acute left intertrochanteric hip fracture:     -- Inpatient. NPO. Bedrest. Orthopedics consulted. IV narcotics and antiemetics as needed for pain or nausea. 50 ml/hour IV fluid ordered.    Myrna-operative Risk Assessment: RCRI 0-1 with Functional Status greater than 4 METS going for intermediate risk procedure for CV events. EKG shows NSR with Q waves and poor R wave progression. Recommend to proceed to urgent surgery without further cardiac intervention or testing.    Rib fracture: Incidentally seen on CXR today, of unclear time frame; she has no current chest pain and this finding could have been due to a previous fall.     -- Aggressive IS    Heart murmur: She was hospitalized here in 2017 after a fall leading to right wrist fracture, and TTE was done and showed preserved LVEF with moderate aortic valve stenosis. Currently her family deny any recent symptoms, of syncope, heart failure or chest pain.    -- Consider follow up TTE after urgent surgery or as an outpatient    Hodgkins Lymphoma: Reportedly treated for recurrence with Rituximab until she decided to stop treatment as it was no longer within her care goals. The specific details of this history are not yet known to me.    -- Noted    Chronic dementia: This is late onset Alzheimers Disease.    -- Resume Aricpet when verified and as able clinically.    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 Diet controlled as an outpatient.    -- ISS insulin while hospitalized.    Rule Out COVID-19 infection  This patient was evaluated during a global COVID-19 pandemic, which necessitated consideration that the patient might be at risk for infection with the SARS-CoV-2 virus that causes COVID-19. Applicable protocols for evaluation were followed during the patient's care. Low suspicion for infection.   -negative COVID-19 PCR test result  -no current indication for precautions    Chief Complaint   Left hip pain    History is obtained from the patient, her daughter at the bedside, and the ED physician whom I have spoken with    History of Present Illness   Eri Puckett is a markedly pleasant 89 year old woman who presents with sudden onset sharp, severe, constant pain in the left hip radiating to the left side, that started this evening. She has severe dementia and can not recall exact details of the events of the evening. It seems EMS was called by family to her home domicile after she was found to have fallen at home while using the bathroom, precipitating the pain. It is greatly exacerbated by any movement of the leg. She received Fentanyl en route to the ED which has partially relieved the pain. She otherwise denies any chest pain, or dyspnea, and her family says she is normally physically active around the house and walking with a walker, without ever getting chest pain or dyspnea, or any other acute complaints.    In the ED, Blood pressure (!) 183/80, pulse 60, temperature 98.6  F (37  C), temperature source Oral, resp. rate 16, SpO2 96 %.    CBC and CMP were notable for WBC 11.4, Ca 8.4, alb 3.0, tprot 5.8,  "glucose 207, otherwise were within the normal reference range. Troponin was negative and EKG showed NSR. COVID was negative.    X-Ray of pelvis and left hip showed: \"IMPRESSION: Comminuted and angulated left intertrochanteric hip fracture.\"    CT Head showed: \"IMPRESSION:  1.  No acute intracranial process.\"    CXR showed: \"IMPRESSION: Age-indeterminate right eighth rib fracture. No pneumothorax.\"    PHYSICAL EXAM  Blood pressure (!) 183/80, pulse 60, temperature 98.6  F (37  C), temperature source Oral, resp. rate 16, SpO2 96 %.  Constitutional: Alert and oriented to person only; no apparent distress  HEENT: normocephalic moist mucus membranes  Respiratory: lungs clear to auscultation bilaterally  Cardiovascular: regular S1 S2; 4/6 jose at upper sternum that does not radiate to the carotids  GI: abdomen soft non tender non distended bowel sounds positive  Lymph/Hematologic: no pallor, no cervical lymphadenopathy  Skin: no rash, good turgor  Musculoskeletal: no clubbing, cyanosis or edema  Neurologic: extra-ocular muscles intact; moves all four extremities  Psychiatric: appropriate affect, limited insight and judgment     DVT Prophylaxis: Pneumatic Compression Devices  Code Status: DNR / DNI per family    Disposition: Expected discharge in 3-4 days    Harshil Rushing MD    Past Medical History    I have reviewed this patient's medical history and updated it with pertinent information if needed.   Past Medical History:   Diagnosis Date     Alzheimer's dementia (H)      Asthma     mild  2013     Cancer (H)     hodkins lymphoma 2009 with recurent now     Diabetes mellitus     hx type 2 40 years     Dyslipidemia      Dysthymia      Esophageal reflux      Fuchs' corneal dystrophy      Gout      HTN (hypertension)      Peripheral neuropathy      Pubic ramus fracture (H) 2019     Right wrist fracture 2017     Thrombocytopenia (H)        Past Surgical History   I have reviewed this patient's surgical history and " updated it with pertinent information if needed.  Past Surgical History:   Procedure Laterality Date     CHOLECYSTECTOMY OPEN       EYE SURGERY       MOHS MICROGRAPHIC PROCEDURE       TOTAL KNEE ARTHROPLASTY Right        Prior to Admission Medications   Prior to Admission Medications   Prescriptions Last Dose Informant Patient Reported? Taking?   ALLOPURINOL PO  Daughter Yes No   Sig: Take 100 mg by mouth daily   Carboxymethylcellul-Glycerin (REFRESH OPTIVE) 1-0.9 % GEL  Daughter Yes No   Sig: Apply to eye 2 times daily Gel to left eye   Cholecalciferol (VITAMIN D3 PO)  Daughter Yes No   Sig: Take 2,000 Units by mouth daily   Cyanocobalamin (VITAMIN B 12 PO)  Daughter Yes No   Sig: Take 1,000 mcg by mouth daily   FLUOXETINE HCL PO  Daughter Yes No   Sig: Take 20 mg by mouth 2 times daily   LISINOPRIL PO   Yes No   Sig: Take 15 mg by mouth daily   Linagliptin (TRADJENTA PO)  Daughter Yes No   Sig: Take 2.5 mg by mouth daily   Multiple Vitamins-Minerals (CENTRUM SILVER) per tablet  Daughter Yes No   Sig: Take 1 tablet by mouth daily   OMEPRAZOLE PO  Daughter Yes No   Sig: Take 20 mg by mouth every morning   Polyethylene Glycol 400 (BLINK TEARS OP)  Daughter Yes No   Sig: Apply 1 drop to eye as needed (to both eye as needed)   Rosuvastatin Calcium (CRESTOR PO)  Daughter Yes No   Sig: Take 20 mg by mouth daily   acetaminophen (TYLENOL) 325 MG tablet   No No   Sig: Take 2 tablets (650 mg) by mouth every 4 hours as needed for mild pain   brimonidine (ALPHAGAN-P) 0.15 % ophthalmic solution  Daughter Yes No   Sig: Place 1 drop Into the left eye 2 times daily   calcium-vitamin D (CALTRATE) 600-400 MG-UNIT per tablet  Daughter Yes No   Sig: Take 1 tablet by mouth 2 times daily      Facility-Administered Medications: None     Allergies   Allergies   Allergen Reactions     Cephalexin Rash       Social History   I have reviewed this patient's social history and updated it with pertinent information if needed. Eri Puckett   reports that she has never smoked. She has never used smokeless tobacco. She reports that she does not drink alcohol and does not use drugs.    Family History   Family history assessed and, except as above, is non-contributory.    Family History   Problem Relation Age of Onset     Glaucoma Father        Review of Systems   The 10 point Review of Systems is negative other than noted in the HPI or here.     Primary Care Physician   Kylee Liang    Data   Labs Ordered and Resulted from Time of ED Arrival Up to the Time of Departure from the ED   COMPREHENSIVE METABOLIC PANEL - Abnormal; Notable for the following components:       Result Value    Calcium 8.4 (*)     Glucose 207 (*)     Protein Total 5.8 (*)     Albumin 3.0 (*)     GFR Estimate 55 (*)     All other components within normal limits   CBC WITH PLATELETS AND DIFFERENTIAL - Abnormal; Notable for the following components:    WBC Count 11.4 (*)     Absolute Neutrophils 10.0 (*)     Absolute Lymphocytes 0.7 (*)     Absolute Immature Granulocytes 0.1 (*)     All other components within normal limits   TROPONIN I - Normal   SARS-COV2 (COVID-19) VIRUS RT-PCR - Normal    Narrative:     Testing was performed using the bryan  SARS-CoV-2 & Influenza A/B Assay on the bryan  Moon  System.  This test should be ordered for the detection of SARS-COV-2 in individuals who meet SARS-CoV-2 clinical and/or epidemiological criteria. Test performance is unknown in asymptomatic patients.  This test is for in vitro diagnostic use under the FDA EUA for laboratories certified under CLIA to perform moderate and/or high complexity testing. This test has not been FDA cleared or approved.  A negative test does not rule out the presence of PCR inhibitors in the specimen or target RNA in concentration below the limit of detection for the assay. The possibility of a false negative should be considered if the patient's recent exposure or clinical presentation suggests COVID-19.  [facility]  [facility] are certified under the Clinical Laboratory Improvement Amendments of 1988 (CLIA-88) as qualified to perform moderate and/or high complexity laboratory testing.   EXTRA BLUE TOP TUBE   EXTRA RED TOP TUBE   EXTRA GREEN TOP (LITHIUM HEPARIN) TUBE   EXTRA PURPLE TOP TUBE   EXTRA BLOOD BANK PURPLE TOP TUBE   PERIPHERAL IV CATHETER   CARDIAC CONTINUOUS MONITORING   COVID-19 VIRUS (CORONAVIRUS) BY PCR    Narrative:     The following orders were created for panel order Asymptomatic COVID-19 Virus (Coronavirus) by PCR Nasopharyngeal.  Procedure                               Abnormality         Status                     ---------                               -----------         ------                     SARS-COV2 (COVID-19) Vir...[[id number]]  Normal              Final result                 Please view results for these tests on the individual orders.   CBC WITH PLATELETS & DIFFERENTIAL    Narrative:     The following orders were created for panel order CBC with platelets differential.  Procedure                               Abnormality         Status                     ---------                               -----------         ------                     CBC with platelets and d...[[id number]]  Abnormal            Final result                 Please view results for these tests on the individual orders.   EXTRA TUBE    Narrative:     The following orders were created for panel order Brant Draw.  Procedure                               Abnormality         Status                     ---------                               -----------         ------                     Extra Blue Top Tube[[id number]]                              Final result               Extra Red Top Tube[[id number]]                               Final result               Extra Green Top (Lithium...[[id number]]                      Final result               Extra Purple Top Tube[[id number]]                            Final result                Extra Blood Bank Purple ...[[id number]]                      In process                   Please view results for these tests on the individual orders.       Data reviewed today:  I personally reviewed the EKG tracing showing NSR.    Recent Results (from the past 24 hour(s))   CT Head w/o Contrast    Narrative    EXAM: CT HEAD W/O CONTRAST  LOCATION: [facility]  DATE/TIME: 7/27/2021 2:43 AM    INDICATION: Head injury  COMPARISON: 04/02/2019  TECHNIQUE: Routine CT Head without IV contrast. Multiplanar reformats. Dose reduction techniques were used.    FINDINGS:  INTRACRANIAL CONTENTS: No intracranial hemorrhage, extraaxial collection, or mass effect.  No CT evidence of acute infarct. Moderate presumed chronic small vessel ischemic changes. Moderate generalized volume loss. No hydrocephalus.     VISUALIZED ORBITS/SINUSES/MASTOIDS: No intraorbital abnormality. No paranasal sinus mucosal disease. No middle ear or mastoid effusion.    BONES/SOFT TISSUES: No acute abnormality.      Impression    IMPRESSION:  1.  No acute intracranial process.   XR Pelvis and Hip Left 1 View    Narrative    EXAM: XR PELVIS AND HIP LEFT 1 VIEW  LOCATION: [facility]  DATE/TIME: 7/27/2021 2:35 AM    INDICATION: fall, pain/deformity of L hip  COMPARISON: None.      Impression    IMPRESSION: Comminuted and angulated left intertrochanteric hip fracture.   XR Chest 1 View    Narrative    EXAM: XR CHEST 1 VIEW  LOCATION: [facility]  DATE/TIME: 7/27/2021 2:45 AM    INDICATION: Fall. Hip fracture.  COMPARISON: 6/3/2015.    FINDINGS: The heart size is normal. The thoracic aorta is calcified. The lungs are clear. Age-indeterminate right posterolateral eighth rib fracture. No pneumothorax.      Impression    IMPRESSION: Age-indeterminate right eighth rib fracture. No pneumothorax.

## 2021-07-27 NOTE — BRIEF OP NOTE
[facility]    Brief Operative Note    Pre-operative diagnosis: Femur fracture (H) [S72.90XA]  Post-operative diagnosis Same as pre-operative diagnosis    Procedure: Procedure(s):  OPEN REDUCTION INTERNAL FIXATION LEFT FEMUR FRACTURE.  Surgeon: Surgeon(s) and Role:     * Nella Barrett MD - Primary     * Randal Carrera PA-C - Assisting  Anesthesia: General   Estimated blood loss: Less than 50 ml  Drains: None  Specimens: * No specimens in log *  Findings:   None.  Complications: None.  Implants:   Implant Name Type Inv. Item Serial No.  Lot No. LRB No. Used Action   IMP SCR SYN DHS/DCS LAG 12.7X90MM 1 STEP .290 - [id number] Metallic Hardware/[employer] IMP SCR SYN DHS/DCS LAG 12.7X90MM 1 STEP .290  [employer] [id number]  42  09 Left 1 Implanted   IMP PLATE SYN DHS 135DEG 78MM 04H .140 - [id number] Metallic Hardware/[employer] IMP PLATE SYN DHS 135DEG 78MM 04H .140  [employer] [id number]  42  09 Left 1 Implanted   IMP SCR SYN CORTEX 4.5X38MM SELF TAP .838 - [id number] Metallic Hardware/[employer] IMP SCR SYN CORTEX 4.5X38MM SELF TAP .838  [employer] [id number]  42  09 Left 2 Implanted   IMP SCR SYN CORTEX 4.5X42MM SELF TAP .842 - [id number] Metallic Hardware/[employer] IMP SCR SYN CORTEX 4.5X42MM SELF TAP .842  [employer] [id number]  42  09 Left 1 Implanted   IMP SCR SYN CORTEX 4.5X36MM SELF TAP .836 - [id number] Metallic Hardware/[employer] IMP SCR SYN CORTEX 4.5X36MM SELF TAP .836  [employer] [id number]  42  09 Left 1 Implanted

## 2021-07-27 NOTE — PHARMACY-ADMISSION MEDICATION HISTORY
Pharmacy Medication History  Admission medication history interview status for the 7/27/2021  admission is complete. See EPIC admission navigator for prior to admission medications     Location of Interview: Phone  Medication history sources: Patient's family/friend (daughter, Blanca)    Significant changes made to the medication list:  Removed Lisinopril and Tradjenta    In the past week, patient estimated taking medication this percent of the time: greater than 90%    Additional medication history information:       Medication reconciliation completed by provider prior to medication history? No    Time spent in this activity: 15 min     Prior to Admission medications    Medication Sig Last Dose Taking? Auth Provider   allopurinol (ZYLOPRIM) 100 MG tablet Take 100 mg by mouth every morning 7/26/2021 at Unknown time Yes Unknown, Entered By History   amLODIPine (NORVASC) 5 MG tablet Take 5 mg by mouth daily (with lunch)  7/26/2021 at 1200 Yes Unknown, Entered By History   brimonidine (ALPHAGAN) 0.2 % ophthalmic solution Place 1 drop into both eyes 2 times daily 7/26/2021 at Unknown time Yes Unknown, Entered By History   calcium-vitamin D (CALTRATE) 600-400 MG-UNIT per tablet Take 1 tablet by mouth 2 times daily (with meals)  7/26/2021 at Unknown time Yes Reported, Patient   Cyanocobalamin (VITAMIN B 12 PO) Take 1,000 mcg by mouth daily (with lunch)  7/26/2021 at 1200 Yes Reported, Patient   diphenhydrAMINE-acetaminophen (TYLENOL PM)  MG tablet Take 1 tablet by mouth nightly as needed for sleep Past Month at Unknown time Yes Unknown, Entered By History   donepezil (ARICEPT) 10 MG tablet Take 20 mg by mouth At Bedtime 7/26/2021 at hs Yes Unknown, Entered By History   FLUoxetine (PROZAC) 10 MG tablet Take 20 mg by mouth every morning 7/26/2021 at am Yes Unknown, Entered By History   loperamide (IMODIUM) 2 MG capsule Take 2 mg by mouth 4 times daily as needed for diarrhea Past Month at Unknown time Yes Unknown,  Entered By History   Multiple Vitamins-Minerals (CENTRUM SILVER) per tablet Take 1 tablet by mouth every morning  7/26/2021 at am Yes Reported, Patient   omeprazole (PRILOSEC) 20 MG DR capsule Take 20 mg by mouth daily (with lunch)  7/26/2021 at 1200 Yes Unknown, Entered By History   Polyethylene Glycol 400 (BLINK TEARS OP) Apply 1 drop to eye as needed (to both eye as needed) Past Week at Unknown time Yes Unknown, Entered By History   rosuvastatin (CRESTOR) 20 MG tablet Take 10 mg by mouth At Bedtime  7/26/2021 at hs Yes Unknown, Entered By History   vitamin D3 (CHOLECALCIFEROL) 50 mcg (2000 units) tablet Take 50 mcg by mouth daily (with dinner) 7/26/2021 at pm Yes Unknown, Entered By History   Carboxymethylcellul-Glycerin (REFRESH OPTIVE) 1-0.9 % GEL Place into both eyes At Bedtime  7/26/2021 at 1800  Unknown, Entered By History       The information provided in this note is only as accurate as the sources available at the time of update(s)

## 2021-07-27 NOTE — PROGRESS NOTES
This patient has been seen and evaluated by me, ASTON LARA MD. Discussed with the house staff team or resident(s) and agree with the findings and plan in this note.

## 2021-07-27 NOTE — PLAN OF CARE
RECEIVING UNIT ED HANDOFF REVIEW    ED Nurse Handoff Report was reviewed by: Naida Valentin RN on July 27, 2021 at 8:01 AM

## 2021-07-28 ENCOUNTER — APPOINTMENT (OUTPATIENT)
Dept: PHYSICAL THERAPY | Facility: CLINIC | Age: 86
DRG: 480 | End: 2021-07-28
Attending: PHYSICIAN ASSISTANT
Payer: COMMERCIAL

## 2021-07-28 LAB
CREAT SERPL-MCNC: 1.06 MG/DL (ref 0.52–1.04)
GFR SERPL CREATININE-BSD FRML MDRD: 45 ML/MIN/1.73M2
GLUCOSE BLDC GLUCOMTR-MCNC: 112 MG/DL (ref 70–99)
GLUCOSE BLDC GLUCOMTR-MCNC: 115 MG/DL (ref 70–99)
GLUCOSE BLDC GLUCOMTR-MCNC: 115 MG/DL (ref 70–99)
GLUCOSE BLDC GLUCOMTR-MCNC: 129 MG/DL (ref 70–99)
GLUCOSE BLDC GLUCOMTR-MCNC: 140 MG/DL (ref 70–99)
GLUCOSE BLDC GLUCOMTR-MCNC: 146 MG/DL (ref 70–99)
HGB BLD-MCNC: 9.7 G/DL (ref 11.7–15.7)

## 2021-07-28 PROCEDURE — 250N000013 HC RX MED GY IP 250 OP 250 PS 637: Performed by: PHYSICIAN ASSISTANT

## 2021-07-28 PROCEDURE — 97530 THERAPEUTIC ACTIVITIES: CPT | Mod: GP | Performed by: PHYSICAL THERAPIST

## 2021-07-28 PROCEDURE — 97162 PT EVAL MOD COMPLEX 30 MIN: CPT | Mod: GP | Performed by: PHYSICAL THERAPIST

## 2021-07-28 PROCEDURE — 36415 COLL VENOUS BLD VENIPUNCTURE: CPT | Performed by: HOSPITALIST

## 2021-07-28 PROCEDURE — 250N000009 HC RX 250: Performed by: INTERNAL MEDICINE

## 2021-07-28 PROCEDURE — 250N000011 HC RX IP 250 OP 636: Performed by: PHYSICIAN ASSISTANT

## 2021-07-28 PROCEDURE — 250N000013 HC RX MED GY IP 250 OP 250 PS 637: Performed by: INTERNAL MEDICINE

## 2021-07-28 PROCEDURE — 36415 COLL VENOUS BLD VENIPUNCTURE: CPT | Performed by: PHYSICIAN ASSISTANT

## 2021-07-28 PROCEDURE — 258N000003 HC RX IP 258 OP 636: Performed by: PHYSICIAN ASSISTANT

## 2021-07-28 PROCEDURE — 99233 SBSQ HOSP IP/OBS HIGH 50: CPT | Performed by: INTERNAL MEDICINE

## 2021-07-28 PROCEDURE — 120N000001 HC R&B MED SURG/OB

## 2021-07-28 PROCEDURE — 85018 HEMOGLOBIN: CPT | Performed by: PHYSICIAN ASSISTANT

## 2021-07-28 PROCEDURE — 82565 ASSAY OF CREATININE: CPT | Performed by: HOSPITALIST

## 2021-07-28 RX ORDER — BRIMONIDINE TARTRATE 2 MG/ML
1 SOLUTION/ DROPS OPHTHALMIC 2 TIMES DAILY
Status: DISCONTINUED | OUTPATIENT
Start: 2021-07-28 | End: 2021-08-06 | Stop reason: HOSPADM

## 2021-07-28 RX ORDER — CHOLECALCIFEROL (VITAMIN D3) 50 MCG
50 TABLET ORAL
Status: DISCONTINUED | OUTPATIENT
Start: 2021-07-28 | End: 2021-08-06 | Stop reason: HOSPADM

## 2021-07-28 RX ORDER — ALLOPURINOL 100 MG/1
100 TABLET ORAL EVERY MORNING
Status: DISCONTINUED | OUTPATIENT
Start: 2021-07-29 | End: 2021-08-06 | Stop reason: HOSPADM

## 2021-07-28 RX ORDER — CARBOXYMETHYLCELLULOSE SODIUM 10 MG/ML
GEL OPHTHALMIC AT BEDTIME
Status: DISCONTINUED | OUTPATIENT
Start: 2021-07-28 | End: 2021-08-06 | Stop reason: HOSPADM

## 2021-07-28 RX ORDER — LOPERAMIDE HCL 2 MG
2 CAPSULE ORAL 4 TIMES DAILY PRN
Status: DISCONTINUED | OUTPATIENT
Start: 2021-07-28 | End: 2021-08-06 | Stop reason: HOSPADM

## 2021-07-28 RX ORDER — QUETIAPINE FUMARATE 25 MG/1
25 TABLET, FILM COATED ORAL 3 TIMES DAILY PRN
Status: DISCONTINUED | OUTPATIENT
Start: 2021-07-28 | End: 2021-08-06 | Stop reason: HOSPADM

## 2021-07-28 RX ORDER — DONEPEZIL HYDROCHLORIDE 10 MG/1
20 TABLET, FILM COATED ORAL AT BEDTIME
Status: DISCONTINUED | OUTPATIENT
Start: 2021-07-28 | End: 2021-08-06 | Stop reason: HOSPADM

## 2021-07-28 RX ADMIN — DONEPEZIL HYDROCHLORIDE 20 MG: 10 TABLET ORAL at 21:17

## 2021-07-28 RX ADMIN — ACETAMINOPHEN 975 MG: 325 TABLET, FILM COATED ORAL at 17:49

## 2021-07-28 RX ADMIN — BRIMONIDINE TARTRATE 1 DROP: 2 SOLUTION OPHTHALMIC at 22:34

## 2021-07-28 RX ADMIN — CLINDAMYCIN PHOSPHATE 900 MG: 900 INJECTION, SOLUTION INTRAVENOUS at 04:23

## 2021-07-28 RX ADMIN — TRAMADOL HYDROCHLORIDE 50 MG: 50 TABLET, FILM COATED ORAL at 21:17

## 2021-07-28 RX ADMIN — ACETAMINOPHEN 975 MG: 325 TABLET, FILM COATED ORAL at 00:02

## 2021-07-28 RX ADMIN — POLYETHYLENE GLYCOL 3350 17 G: 17 POWDER, FOR SOLUTION ORAL at 10:25

## 2021-07-28 RX ADMIN — QUETIAPINE FUMARATE 25 MG: 25 TABLET ORAL at 21:17

## 2021-07-28 RX ADMIN — INSULIN ASPART 1 UNITS: 100 INJECTION, SOLUTION INTRAVENOUS; SUBCUTANEOUS at 01:12

## 2021-07-28 RX ADMIN — ASPIRIN 325 MG: 325 TABLET, COATED ORAL at 10:25

## 2021-07-28 RX ADMIN — SODIUM CHLORIDE, POTASSIUM CHLORIDE, SODIUM LACTATE AND CALCIUM CHLORIDE: 600; 310; 30; 20 INJECTION, SOLUTION INTRAVENOUS at 15:33

## 2021-07-28 RX ADMIN — INSULIN ASPART 1 UNITS: 100 INJECTION, SOLUTION INTRAVENOUS; SUBCUTANEOUS at 04:24

## 2021-07-28 RX ADMIN — HYDROMORPHONE HYDROCHLORIDE 0.2 MG: 0.2 INJECTION, SOLUTION INTRAMUSCULAR; INTRAVENOUS; SUBCUTANEOUS at 12:06

## 2021-07-28 ASSESSMENT — MIFFLIN-ST. JEOR: SCORE: 911.96

## 2021-07-28 ASSESSMENT — ACTIVITIES OF DAILY LIVING (ADL)
ADLS_ACUITY_SCORE: 13

## 2021-07-28 NOTE — PLAN OF CARE
A/O to self. Lethargic. VSS on 1L O2. Pt paranoid/fearful of staff one minute then will be pleasant and grateful for the help. Pt not complaining of pain, nonverbal indicators of pain with turns. Not OOB. Perrin out at 0630. Pt has not eaten much, sips of water offered to pt. Difficult to get pt to take meds, lots of encouragement required. Discharge pending.

## 2021-07-28 NOTE — PLAN OF CARE
OT: Orders received. Chart reviewed and discussed with care team. Pt admitted due to mechanical fall causing acute left intertrochanteric hip fracture, s/p ORIF on 7/27. Spoke with PT, pt currently with increased confusion and requiring increased A with functional mobility. Will defer OT to TCU or next level of care.  Defer discharge recommendations to PT.  OT order completed.

## 2021-07-28 NOTE — PROGRESS NOTES
Care Transitions Team: Following for CC, discharge planning, and disposition.       Per [facility] Trauma Liaison Handoff:   Writer spoke with daughter (blanca) via phone, introduced myself and explained my role in her mothers plan of care. We discussed discharge planning and Blanca stated that mom will need a rehab stay. Provide medicare star ratings and she requested  send referrals to facilities below. She would like a semi-private or double room.    Living situation:   Support:Family    Available transportation: Family will provide if she is able to safely pivot transfer, but if not  will need to schedule. Writer did explain the average cost of transport $78 base fee and $5 dollars a mile,    Home environment:    Stairs-had rails? None    Prior Function level:   Ambulation Independent    ADL's Family assisted with medications, cleaning, shopping and laundry.    Current home care services: None    Family/patient discharge goal: Return to baseline level of function. Daughter is thinking she may need to look at assisted living facility vs return to her apartment.  Barriers to Discharge: Medically stable  Discharge Plan of care: TCU stay   TCU - Medicare compare TCU list provided - CM discussed Medicare compare website and star ratings.   1.  [facility]  2.  [facility]  3. [facility]  4. [facility]  5.  [facility]  Orders needed for services: Post Op Plan of Care - TCU - PT/OT evaluate and treat     Weight bearing status and Hip precautions: 50% WB left LE with walker        Will follow in collaboration with O CM  Kathe Martinez [phone] [facility] for discharge planning.

## 2021-07-28 NOTE — PROVIDER NOTIFICATION
Text page sent to Dr. Dupont: cassandra ROBERTO out [phone]. Bladder scan 30 ml. IVF running. Poor oral intake. Do you want to bolus?

## 2021-07-28 NOTE — ANESTHESIA POSTPROCEDURE EVALUATION
Patient: Eri Puckett    Procedure(s):  OPEN REDUCTION INTERNAL FIXATION LEFT FEMUR FRACTURE.    Diagnosis:Femur fracture (H) [S72.90XA]  Diagnosis Additional Information: No value filed.    Anesthesia Type:  General    Note:     Postop Pain Control: Uneventful            Sign Out: Well controlled pain   PONV: No   Neuro/Psych: Uneventful            Sign Out: Acceptable/Baseline neuro status   Airway/Respiratory: Uneventful            Sign Out: Acceptable/Baseline resp. status   CV/Hemodynamics: Uneventful            Sign Out: Acceptable CV status; No obvious hypovolemia; No obvious fluid overload   Other NRE: NONE   DID A NON-ROUTINE EVENT OCCUR? No           Last vitals:  Vitals Value Taken Time   /63 07/27/21 1620   Temp     Pulse 115 07/27/21 1628   Resp     SpO2 92 % 07/27/21 1629   Vitals shown include unvalidated device data.    Electronically Signed By: Kane Blount MD  July 28, 2021  6:21 AM

## 2021-07-28 NOTE — PROGRESS NOTES
Orthopedic Surgery  Eri Puckett  2021  Admit Date:  2021  POD: 1 Day Post-Op   Procedure(s):  OPEN REDUCTION INTERNAL FIXATION LEFT FEMUR FRACTURE.    Patient resting comfortably in bed.    Reports pain in left thigh  Tolerating oral intake.    Denies nausea or vomiting  Denies chest pain or shortness of breath    Vital Sign Ranges  Temperature Temp  Av.3  F (36.8  C)  Min: 97.3  F (36.3  C)  Max: 99  F (37.2  C)   Blood pressure Systolic (24hrs), Av , Min:107 , Max:184        Diastolic (24hrs), Av, Min:46, Max:151      Pulse Pulse  Av.6  Min: 77  Max: 126   Respirations Resp  Avg: 15.7  Min: 12  Max: 18   Pulse oximetry SpO2  Av.6 %  Min: 90 %  Max: 100 %       Dressing is clean, dry, and intact.   Minimal erythema of the surrounding skin.   Bilateral calves are soft, non-tender.  Left lower extremity is NVI.  Sensation intact bilateral lower extremities  Patient able to resist dorsi and plantar flexion bilaterally  +Dp pulse    Labs:  Recent Labs   Lab Test 21  0155 19  0550 10/12/17  0655   POTASSIUM 3.7 4.0 3.8     Recent Labs   Lab Test 21  0728 21  0155 19  0550   HGB 9.7* 13.1 13.1     Recent Labs   Lab Test 06/03/15  1030 13  0946 10/03/13  0945   INR 0.98 0.96 1.01     Recent Labs   Lab Test 21  0155 19  0550 10/14/17  0555    109* 103*       1. PLAN:   Continue ASA for DVT prophylaxis.     Mobilize with PT/OT    50% WB Left LE with walker.     Continue current pain regiment.   Dressings: Keep intact.  Change if >60% saturated    2. Disposition   Anticipate d/c to TCU when medically cleared and progressing in PT.    Consuelo Tan PA-C

## 2021-07-28 NOTE — PROGRESS NOTES
Clinical Nutrition Brief Note    Received positive admission nutrition MST, score 2  - Have you recently lost weight without trying?: unsure (2)   - Have you been eating poorly because of a decreased appetite?: no (1)     Chart reviewed and discussed with daughter over the phone  Patient has a h/o advanced dementia and is alert to self only   Daughter states that pt's weight has been stable PTA and she has no concerns regarding recent changes in intake and appetite  Assisted in lunch and dinner order for today; pt is hungry     No admission weight obtained --> ordered  Wt Readings from Last 10 Encounters:   04/02/19 67.6 kg (149 lb)   10/14/17 79.4 kg (175 lb 0.7 oz)   11/26/13 61.2 kg (135 lb)   11/27/07 83.9 kg (185 lb)     Care Everywhere:   59.9 kg (132 lb) 04/27/2021 2:48 PM CDT    59.4 kg (131 lb) 03/23/2021 3:47 PM CDT   62.1 kg (137 lb) 12/18/2020 9:51 AM CST     Patient does not meet MST criteria for nutrition assessment at this time   Will follow-up at hospital length of stay, unless otherwise re-consulted, thanks    Gladis Doran, AKUA, LD  Clinical Dietitian

## 2021-07-28 NOTE — PROGRESS NOTES
07/28/21 1320   Quick Adds   Type of Visit Initial PT Evaluation       Present no   Language English   Living Environment   Living Environment Comments Pt lives in independent senior living, per dtr pt is indep with fn mobility at baseline. Unable to fully clarify 2/2 pt confusion, dtr in/out of room at time of eval.   Self-Care   Usual Activity Tolerance moderate   Current Activity Tolerance poor   Equipment Currently Used at Home walker, rolling   Disability/Function   Hearing Difficulty or Deaf yes   Patient's preferred means of communication verbal   Describe hearing loss bilateral hearing loss   Use of hearing assistive devices bilateral hearing aids   Hearing Management Personal hearing aids used   Fall history within last six months yes   Number of times patient has fallen within last six months 3   Change in Functional Status Since Onset of Current Illness/Injury yes   General Information   Onset of Illness/Injury or Date of Surgery 07/27/21   Referring Physician Randal Carrera PA-C   Patient/Family Therapy Goals Statement (PT) Unable to state   Pertinent History of Current Problem (include personal factors and/or comorbidities that impact the POC) Pt is 89 year old woman with past medical history that is most notable for advanced chronic Alzheimer's dementia, as well as Type 2 Diabetes mellitus and Hodgkins Lymphoma, among others; who presents with mechanical fall causing acute left intertrochanteric hip fracture, post open reduction and internal fixation 7/27. Pt also found to have acute blood loss anemia, hemoglobin dropped from 13-9.5, will closely monitor, and Rib fracture: Incidentally seen on CXR today, of unclear time frame; she has no current chest pain and this finding could have been due to a previous fall.    Existing Precautions/Restrictions fall  (no hip precautions per MD order and surgical method used)   Weight-Bearing Status - LLE partial weight-bearing (% in  comments)  (50%)   Cognition   Orientation Status (Cognition) disoriented to;place;situation;time   Affect/Mental Status (Cognition) anxious;confused;emotionally labile;agitated;confabulatory   Follows Commands (Cognition) follows one-step commands;0-24% accuracy;delayed response/completion;verbal cues/prompting required;initiation impaired;increased processing time needed   Behavioral Issues combative/physical outbursts;uncooperative;verbal outbursts;inappropriate language;difficulty managing stress;disinhibition;overwhelmed easily   Safety Deficit (Cognition) at risk behavior observed;safety precautions awareness;problem-solving;insight into deficits/self-awareness;judgment;impulsivity;awareness of need for assistance;severe deficit;ability to follow commands;safety precautions follow-through/compliance   Memory Deficit (Cognition) moderate deficit   Cognitive Status Comments Advanced Alzheimer's Dementia   Pain Assessment   Patient Currently in Pain Yes, see Vital Sign flowsheet  (Pt unable to state 2/2 confusion/dementia)   Integumentary/Edema   Integumentary/Edema Comments Mild B LE edema noted, L>R s/p hip surgery   Posture    Posture Forward head position;Protracted shoulders   Range of Motion (ROM)   ROM Quick Adds ROM deficits secondary to pain;ROM deficits secondary to surgical procedure   Strength   Manual Muscle Testing Quick Adds Deficits observed during functional mobility   Strength Comments Grossly deconditioned, grossly 4/5 R LE, unable to follow cues for L LE due to dementia/pain   Bed Mobility   Bed Mobility supine-sit   Comment (Bed Mobility) max A x 2 2/2 confusion/dementia   Transfers   Transfer Safety Comments Not attempted 2/2 safety concerns with confusion/dementia, pt refusing   Gait/Stairs (Locomotion)   Comment (Gait/Stairs) Not attempted 2/2 safety concerns with confusion/dementia, pt refusing   Balance   Balance Comments At baseline has balance impairments, needs B UE support from 2WW    Sensory Examination   Sensory Perception Comments Not assessed   Coordination   Coordination no deficits were identified   Muscle Tone   Muscle Tone no deficits were identified   Clinical Impression   Criteria for Skilled Therapeutic Intervention yes, treatment indicated   PT Diagnosis (PT) Impaired fn mobility    Influenced by the following impairments cognition, pain, strength, balance   Functional limitations due to impairments Impaired bed mob, transfers, amb   Clinical Presentation Evolving/Changing   Clinical Presentation Rationale Pt presenting with multiple comorbidities affecting presentation, asssessment incld fn mob, strength, cognitive ability to follow cues for PT session   Clinical Decision Making (Complexity) moderate complexity   Therapy Frequency (PT) 5x/week   Predicted Duration of Therapy Intervention (days/wks) 3 days   Planned Therapy Interventions (PT) bed mobility training;gait training;balance training;ROM (range of motion);transfer training;home exercise program;postural re-education;neuromuscular re-education;strengthening;stretching   Anticipated Equipment Needs at Discharge (PT)   (Pt owns 2WW and manual w/c )   Risk & Benefits of therapy have been explained care plan/treatment goals reviewed;participants voiced agreement with care plan;participants included;patient;daughter  (Pt unable to follow 2/2 dementia, dtr in/out of room)   Clinical Impression Comments Disoriented, combative   PT Discharge Planning    PT Discharge Recommendation (DC Rec) Transitional Care Facility   PT Rationale for DC Rec Pt presenting significantly below baseline, combative/confused in advanced stage of Alzheimer's. Pt had been living indep in senior living facility, dtr notes multiple falls at home. Pt currently unable to follow cues, unsafe to stand/trnasfer due to safety concerns. Pt would benefit from continued skilled IP and further TCU stay to progress fn mobilty. Pt is not safe to d/c to home at this  time.    PT Brief overview of current status  Ax2 with bed mob, sitting EOB   Total Evaluation Time   Total Evaluation Time (Minutes) 15   Coping Strategies   Trust Relationship/Rapport empathic listening provided;questions answered;questions encouraged;reassurance provided;thoughts/feelings acknowledged;care explained;choices provided;emotional support provided   Wellbeing Promotion   Family/Support System Care caregiver stress acknowledged;presence promoted;self-care encouraged

## 2021-07-28 NOTE — PLAN OF CARE
Disoriented x4. VSS, on 2L O2 NC. Not yet OOB. Confused/ increasingly agitated this am. Refused am meds ex sched Aspirin. Refused breakfast/ lunch. IV Dilaudid given x1 for L hip pain, relief noted. Aquacel CDI, refused complete skin check. LR at 75. Continuous capno maintained. Family at bedside. Poor tolerance during PT. Remains DTV, bladder scan <50cc. Po fluids encouraged. Discharge plan pending, continue pain management/ supportive cares.

## 2021-07-28 NOTE — PROGRESS NOTES
[facility]    Hospitalist Progress Note    Assessment & Plan   Eri Puckett is a markedly pleasant 89 year old woman with past medical history that is most notable for advanced chronic dementia, as well as Type 2 Diabetes mellitus and Hodgkins Lymphoma, among others; who presents with mechanical fall causing acute left intertrochanteric hip fracture.     PLAN     Mechanical fall causing acute left intertrochanteric hip fracture:     --That is post open reduction and internal fixation 7/27, appreciate orthopedics input  ---Diet, DVT prophylaxis and pain control as per Ortho  acute blood loss anemia  --Hemoglobin dropped from 13-9.5, will closely monitor, CBC for tomorrow ordered.  Rib fracture: Incidentally seen on CXR today, of unclear time frame; she has no current chest pain and this finding could have been due to a previous fall.     -- Aggressive IS     Heart murmur: She was hospitalized here in 2017 after a fall leading to right wrist fracture, and TTE was done and showed preserved LVEF with moderate aortic valve stenosis. Currently her family deny any recent symptoms, of syncope, heart failure or chest pain.    -- Consider follow up TTE after urgent surgery or as an outpatient     Hodgkins Lymphoma: Reportedly treated for recurrence with Rituximab until she decided to stop treatment as it was no longer within her care goals.      Chronic dementia: This is late onset Alzheimers Disease.    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 Diet controlled as an outpatient.    -- ISS insulin while hospitalized.     Rule Out COVID-19 infection    DVT Prophylaxis: As per Ortho  Code Status: No CPR- Do NOT Intubate     Disposition: Expected discharge in 2 to 3  days possibly TCU  Contacted daughter on the phone and left message to call back.  Ivy Dupont MD  Text Page   (7am to 6pm)    Interval History   He was pleasant in the morning, later nursing mentioned agitation, patient appeared confused, she has underlying dementia, possibly added delirium.    -Data reviewed today: I reviewed all new labs and imaging results over the last 24 hours.     Physical Exam   Temp: 99  F (37.2  C) Temp src: Oral BP: 119/46 Pulse: 77   Resp: 16 SpO2: 95 % O2 Device: None (Room air) Oxygen Delivery: 1 LPM  There were no vitals filed for this visit.  Vital Signs with Ranges  Temp:  [97.3  F (36.3  C)-99  F (37.2  C)] 99  F (37.2  C)  Pulse:  [] 77  Resp:  [12-18] 16  BP: (107-184)/() 119/46  SpO2:  [90 %-100 %] 95 %  I/O last 3 completed shifts:  In: -   Out: 400 [Urine:400]    Constitutional: Awake, alert, cooperative, no apparent distress  Respiratory: Clear to auscultation bilaterally, no crackles or wheezing  Cardiovascular: Regular rate and rhythm, normal S1 and S2, and no murmur noted  GI: Normal bowel sounds, soft, non-distended, non-tender  Skin/Integumen: Left lower extremity status post surgery  Neuro : moving all 4 extremities, no focal deficit noted     Medications     lactated ringers 75 mL/hr at 07/27/21 2005       acetaminophen  975 mg Oral Q8H     aspirin  325 mg Oral Daily     insulin aspart  1-6 Units Subcutaneous Q4H     polyethylene glycol  17 g Oral Daily     senna-docusate  1 tablet Oral BID     sodium chloride (PF)  3 mL Intracatheter Q8H       Data   Recent Labs   Lab 07/28/21  1224 07/28/21  1017 07/28/21  0728 07/28/21  0416 07/27/21  0155   WBC  --   --   --   --  11.4*   HGB  --   --  9.7*  --  13.1   MCV  --   --   --   --  88   PLT  --   --   --   --  154   NA  --   --   --   --  141   POTASSIUM  --   --   --   --  3.7   CHLORIDE  --   --   --   --  108   CO2  --   --   --   --  30   BUN  --   --   --   --  15   CR  --   --   --   --  0.90    ANIONGAP  --   --   --   --  3   GARRETT  --   --   --   --  8.4*   * 112*  --  140* 207*   ALBUMIN  --   --   --   --  3.0*   PROTTOTAL  --   --   --   --  5.8*   BILITOTAL  --   --   --   --  0.4   ALKPHOS  --   --   --   --  51   ALT  --   --   --   --  23   AST  --   --   --   --  23   TROPONIN  --   --   --   --  <0.015     Recent Labs   Lab 07/28/21  1224 07/28/21  1017 07/28/21  0416 07/28/21  0048 07/27/21 2025   * 112* 140* 146* 158*       Imaging:   Recent Results (from the past 24 hour(s))   XR Surgery ADDY Fluoro L/T 5 Min w Stills    Narrative    SURGERY C-ARM FLUORO LESS THAN 5 MIN W STILLS July 27, 2021 2:15 PM     HISTORY: Left open reduction internal fixation femur fracture.    COMPARISON: X-ray from earlier today.    NUMBER OF IMAGES ACQUIRED: 12.    VIEWS: Two.    FLUOROSCOPY TIME: 1.7 minutes.      Impression    IMPRESSION: Images demonstrate the procedure of placement of  cephalomedullary nail for fixation of intertrochanteric fracture.    HOLLY VICENTE MD         SYSTEM ID:  [id number]

## 2021-07-28 NOTE — PROVIDER NOTIFICATION
MD Notification    Notified Person: MD    Notified Person Name: Srinivas    Notification Date/Time: 7/27/21 2140    Notification Interaction: Webpaged    Purpose of Notification: Code status question. H&P indicates DNR/I  per family. Currently Full code per chart after surgery. Wondering if it was changed for surgery and not switched back?     Orders Received: Continue full code. AM team to address.     Comments:

## 2021-07-29 ENCOUNTER — APPOINTMENT (OUTPATIENT)
Dept: PHYSICAL THERAPY | Facility: CLINIC | Age: 86
DRG: 480 | End: 2021-07-29
Payer: COMMERCIAL

## 2021-07-29 LAB
ANION GAP SERPL CALCULATED.3IONS-SCNC: 4 MMOL/L (ref 3–14)
ATRIAL RATE - MUSE: 113 BPM
BUN SERPL-MCNC: 18 MG/DL (ref 7–30)
CALCIUM SERPL-MCNC: 7.8 MG/DL (ref 8.5–10.1)
CHLORIDE BLD-SCNC: 107 MMOL/L (ref 94–109)
CO2 SERPL-SCNC: 30 MMOL/L (ref 20–32)
CREAT SERPL-MCNC: 0.87 MG/DL (ref 0.52–1.04)
DIASTOLIC BLOOD PRESSURE - MUSE: NORMAL MMHG
ERYTHROCYTE [DISTWIDTH] IN BLOOD BY AUTOMATED COUNT: 13.1 % (ref 10–15)
GFR SERPL CREATININE-BSD FRML MDRD: 58 ML/MIN/1.73M2
GLUCOSE BLD-MCNC: 106 MG/DL (ref 70–99)
GLUCOSE BLDC GLUCOMTR-MCNC: 113 MG/DL (ref 70–99)
GLUCOSE BLDC GLUCOMTR-MCNC: 117 MG/DL (ref 70–99)
GLUCOSE BLDC GLUCOMTR-MCNC: 145 MG/DL (ref 70–99)
GLUCOSE BLDC GLUCOMTR-MCNC: 156 MG/DL (ref 70–99)
GLUCOSE BLDC GLUCOMTR-MCNC: 193 MG/DL (ref 70–99)
HCT VFR BLD AUTO: 27.7 % (ref 35–47)
HGB BLD-MCNC: 8.6 G/DL (ref 11.7–15.7)
INTERPRETATION ECG - MUSE: NORMAL
MCH RBC QN AUTO: 28 PG (ref 26.5–33)
MCHC RBC AUTO-ENTMCNC: 31 G/DL (ref 31.5–36.5)
MCV RBC AUTO: 90 FL (ref 78–100)
P AXIS - MUSE: NORMAL DEGREES
PLATELET # BLD AUTO: 100 10E3/UL (ref 150–450)
POTASSIUM BLD-SCNC: 3.5 MMOL/L (ref 3.4–5.3)
PR INTERVAL - MUSE: 240 MS
QRS DURATION - MUSE: 94 MS
QT - MUSE: 368 MS
QTC - MUSE: 504 MS
R AXIS - MUSE: -74 DEGREES
RBC # BLD AUTO: 3.07 10E6/UL (ref 3.8–5.2)
SODIUM SERPL-SCNC: 141 MMOL/L (ref 133–144)
SYSTOLIC BLOOD PRESSURE - MUSE: NORMAL MMHG
T AXIS - MUSE: 47 DEGREES
VENTRICULAR RATE- MUSE: 113 BPM
WBC # BLD AUTO: 6.4 10E3/UL (ref 4–11)

## 2021-07-29 PROCEDURE — 250N000013 HC RX MED GY IP 250 OP 250 PS 637: Performed by: PHYSICIAN ASSISTANT

## 2021-07-29 PROCEDURE — 250N000013 HC RX MED GY IP 250 OP 250 PS 637: Performed by: INTERNAL MEDICINE

## 2021-07-29 PROCEDURE — 99233 SBSQ HOSP IP/OBS HIGH 50: CPT | Performed by: INTERNAL MEDICINE

## 2021-07-29 PROCEDURE — 120N000001 HC R&B MED SURG/OB

## 2021-07-29 PROCEDURE — 85014 HEMATOCRIT: CPT | Performed by: INTERNAL MEDICINE

## 2021-07-29 PROCEDURE — 97530 THERAPEUTIC ACTIVITIES: CPT | Mod: GP

## 2021-07-29 PROCEDURE — 80048 BASIC METABOLIC PNL TOTAL CA: CPT | Performed by: INTERNAL MEDICINE

## 2021-07-29 PROCEDURE — 258N000003 HC RX IP 258 OP 636: Performed by: PHYSICIAN ASSISTANT

## 2021-07-29 PROCEDURE — 36415 COLL VENOUS BLD VENIPUNCTURE: CPT | Performed by: INTERNAL MEDICINE

## 2021-07-29 RX ADMIN — ACETAMINOPHEN 975 MG: 325 TABLET, FILM COATED ORAL at 16:20

## 2021-07-29 RX ADMIN — CARBOXYMETHYLCELLULOSE SODIUM 2 DROP: 10 GEL OPHTHALMIC at 21:56

## 2021-07-29 RX ADMIN — SODIUM CHLORIDE, POTASSIUM CHLORIDE, SODIUM LACTATE AND CALCIUM CHLORIDE: 600; 310; 30; 20 INJECTION, SOLUTION INTRAVENOUS at 10:09

## 2021-07-29 RX ADMIN — SENNOSIDES AND DOCUSATE SODIUM 1 TABLET: 8.6; 5 TABLET ORAL at 20:34

## 2021-07-29 RX ADMIN — BRIMONIDINE TARTRATE 1 DROP: 2 SOLUTION OPHTHALMIC at 10:13

## 2021-07-29 RX ADMIN — POLYETHYLENE GLYCOL 3350 17 G: 17 POWDER, FOR SOLUTION ORAL at 08:22

## 2021-07-29 RX ADMIN — DONEPEZIL HYDROCHLORIDE 20 MG: 10 TABLET ORAL at 20:34

## 2021-07-29 RX ADMIN — TRAMADOL HYDROCHLORIDE 50 MG: 50 TABLET, FILM COATED ORAL at 04:51

## 2021-07-29 RX ADMIN — HYDROXYZINE HYDROCHLORIDE 10 MG: 10 TABLET ORAL at 04:51

## 2021-07-29 RX ADMIN — ASPIRIN 325 MG: 325 TABLET, COATED ORAL at 08:21

## 2021-07-29 RX ADMIN — INSULIN ASPART 1 UNITS: 100 INJECTION, SOLUTION INTRAVENOUS; SUBCUTANEOUS at 20:39

## 2021-07-29 RX ADMIN — BRIMONIDINE TARTRATE 1 DROP: 2 SOLUTION OPHTHALMIC at 20:39

## 2021-07-29 RX ADMIN — Medication 50 MCG: at 16:20

## 2021-07-29 RX ADMIN — ACETAMINOPHEN 325 MG: 325 TABLET, FILM COATED ORAL at 08:21

## 2021-07-29 ASSESSMENT — ACTIVITIES OF DAILY LIVING (ADL)
ADLS_ACUITY_SCORE: 19
ADLS_ACUITY_SCORE: 17
ADLS_ACUITY_SCORE: 21
ADLS_ACUITY_SCORE: 19

## 2021-07-29 ASSESSMENT — MIFFLIN-ST. JEOR: SCORE: 916.04

## 2021-07-29 NOTE — PLAN OF CARE
Oriented to self only. Agitated at times. Yells with repositioning. Denies pain but guarding L hip. Tramadol given x1. IVF infusing. Incontinent of urine. Poor oral intake.

## 2021-07-29 NOTE — PROGRESS NOTES
Orthopedic Surgery  Eri Puckett  2021  Admit Date:  2021  POD 2 Days Post-Op  S/P Procedure(s):  OPEN REDUCTION INTERNAL FIXATION LEFT FEMUR FRACTURE.    Patient resting comfortably in bed.    Pain controlled.  Tolerating oral intake.    Denies nausea or vomiting  Denies chest pain or shortness of breath  No events overnight.     Alert and orient to person  Vital Sign Ranges  Temperature Temp  Av  F (36.7  C)  Min: 97.6  F (36.4  C)  Max: 98.4  F (36.9  C)   Blood pressure Systolic (24hrs), Av , Min:122 , Max:135        Diastolic (24hrs), Av, Min:51, Max:87      Pulse Pulse  Av.5  Min: 70  Max: 86   Respirations Resp  Av  Min: 14  Max: 18   Pulse oximetry SpO2  Av.3 %  Min: 93 %  Max: 95 %       Dressing is clean, dry, and intact.   Minimal erythema of the surrounding skin.   Bilateral calves are soft, non-tender.  Bilateral lower extremity is NVI.  Sensation intact bilateral lower extremities  5/5 motor with resisted dorsi and plantar flexion bilaterally  +Dp pulse    Labs:  Recent Labs   Lab Test 21  0801 21  0155 19  0550   POTASSIUM 3.5 3.7 4.0     Recent Labs   Lab Test 21  0801 21  0728 21  0155   HGB 8.6* 9.7* 13.1     Recent Labs   Lab Test 06/03/15  1030 13  0946 10/03/13  0945   INR 0.98 0.96 1.01     Recent Labs   Lab Test 21  0801 21  0155 19  0550   * 154 109*       A/P  1. S/p left intertrochanteric femur fracture DHS    Continue ASA for DVT prophylaxis.     Mobilize with PT/OT    50 %WB left LE   Leave dressing intact.     Continue current pain regiment.    2. Disposition   Anticipate d/c to TCU based on progress with PT, placement and medical clearance.    Jacinta Colorado PA-C

## 2021-07-29 NOTE — PLAN OF CARE
Pt is A&Ox1 (self only), VSS on 2 L oxygen via NC. Pt sleeping for most of this shift.. Pt vehemently declined meds and cares at times, agitated and tearful with repositioning. Lung sounds diminished. Up w/2 and lift, incontinent of urine. Regular diet. YOLANDA CMS, L hip dressing with small amount of serosanguinous drainage. Pain managed with PRN tramadol and atarax x1. IV SL. Will continue to follow plan of care.

## 2021-07-29 NOTE — PROGRESS NOTES
[facility]    Hospitalist Progress Note    Assessment & Plan   Eri Puckett is a markedly pleasant 89 year old woman with past medical history that is most notable for advanced chronic dementia, as well as Type 2 Diabetes mellitus and Hodgkins Lymphoma, among others; who presents with mechanical fall causing acute left intertrochanteric hip fracture.     PLAN     Mechanical fall causing acute left intertrochanteric hip fracture:     --That is post open reduction and internal fixation 7/27, appreciate orthopedics input  ---Diet, DVT prophylaxis and pain control as per Ortho  acute blood loss anemia  --Hemoglobin dropped from 13-9.5-8.6, will closely monitor, CBC for tomorrow ordered.  Rib fracture: Incidentally seen on CXR today, of unclear time frame; she has no current chest pain and this finding could have been due to a previous fall.     -- Aggressive IS   metabolic encephalopathy  Delirium  --- Following surgery, continue as needed Seroquel, continue delirium cares, family updated.  Heart murmur: She was hospitalized here in 2017 after a fall leading to right wrist fracture, and TTE was done and showed preserved LVEF with moderate aortic valve stenosis. Currently her family deny any recent symptoms, of syncope, heart failure or chest pain.    -- Consider follow up TTE after urgent surgery or as an outpatient     Hodgkins Lymphoma: Reportedly treated for recurrence with Rituximab until she decided to stop treatment as it was no longer within her care goals.      Chronic dementia: This is late onset Alzheimers Disease.    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 Diet controlled as an outpatient.    -- ISS insulin while hospitalized.     Rule Out  COVID-19 infection    DVT Prophylaxis: As per Ortho  Code Status: No CPR- Do NOT Intubate     Disposition: Expected discharge to TCU in 1 to 2 days, son-in-law updated at bedside.  Ivy Dupont MD  Text Page   (7am to 6pm)    Interval History   Was very sleepy today, was pleasant, denied any nausea, she is on 1 L of oxygen, her creatinine is improved, output is improved.  Try to reduce IV fluids.  -Data reviewed today: I reviewed all new labs and imaging results over the last 24 hours.     Physical Exam   Temp: 97.6  F (36.4  C) Temp src: Oral BP: 122/51 Pulse: 80   Resp: 14 SpO2: 95 % O2 Device: Nasal cannula Oxygen Delivery: 1 LPM  Vitals:    07/28/21 2100 07/29/21 0547   Weight: 59.4 kg (131 lb) 59.8 kg (131 lb 14.4 oz)     Vital Signs with Ranges  Temp:  [97.6  F (36.4  C)-98.6  F (37  C)] 97.6  F (36.4  C)  Pulse:  [74-86] 80  Resp:  [12-18] 14  BP: (122-135)/(51-87) 122/51  SpO2:  [95 %-96 %] 95 %  I/O last 3 completed shifts:  In: 641 [P.O.:100; I.V.:541]  Out: 0     Constitutional: Awake, alert, cooperative, no apparent distress  Respiratory: Clear to auscultation bilaterally, no crackles or wheezing  Cardiovascular: Regular rate and rhythm, normal S1 and S2, and no murmur noted  GI: Normal bowel sounds, soft, non-distended, non-tender  Skin/Integumen: Left lower extremity status post surgery  Neuro : moving all 4 extremities, no focal deficit noted     Medications     lactated ringers 75 mL/hr at 07/29/21 1009       acetaminophen  975 mg Oral Q8H     allopurinol  100 mg Oral QAM     aspirin  325 mg Oral Daily     brimonidine  1 drop Both Eyes BID     calcium carbonate 600 mg-vitamin D 400 units  1 tablet Oral BID w/meals     carboxymethylcellulose PF   Both Eyes At Bedtime     donepezil  20 mg Oral At Bedtime     FLUoxetine  20 mg Oral QAM     insulin aspart  1-6 Units Subcutaneous Q4H     omeprazole  20 mg Oral Daily with lunch     polyethylene glycol  17 g Oral Daily     senna-docusate  1 tablet Oral  BID     sodium chloride (PF)  3 mL Intracatheter Q8H     vitamin D3  50 mcg Oral Daily with supper       Data   Recent Labs   Lab 07/29/21  1301 07/29/21  0801 07/29/21  0523 07/28/21  1628 07/28/21  0728 07/27/21  0155   WBC  --  6.4  --   --   --  11.4*   HGB  --  8.6*  --   --  9.7* 13.1   MCV  --  90  --   --   --  88   PLT  --  100*  --   --   --  154   NA  --  141  --   --   --  141   POTASSIUM  --  3.5  --   --   --  3.7   CHLORIDE  --  107  --   --   --  108   CO2  --  30  --   --   --  30   BUN  --  18  --   --   --  15   CR  --  0.87  --  1.06*  --  0.90   ANIONGAP  --  4  --   --   --  3   GARRETT  --  7.8*  --   --   --  8.4*   * 106* 145*  --   --  207*   ALBUMIN  --   --   --   --   --  3.0*   PROTTOTAL  --   --   --   --   --  5.8*   BILITOTAL  --   --   --   --   --  0.4   ALKPHOS  --   --   --   --   --  51   ALT  --   --   --   --   --  23   AST  --   --   --   --   --  23   TROPONIN  --   --   --   --   --  <0.015     Recent Labs   Lab 07/29/21  1301 07/29/21  0801 07/29/21  0523 07/29/21  0330 07/28/21  2223   * 106* 145* 113* 115*       Imaging:   No results found for this or any previous visit (from the past 24 hour(s)).

## 2021-07-29 NOTE — PLAN OF CARE
Patient vital signs are at baseline: Yes.   Patient able to ambulate as they were prior to admission or with assist devices provided by therapies during their stay:  No,  Reason:  Up w/ extensive assist w/ PT only.   Patient MUST void prior to discharge:  Yes- incontinent-pure wick.   Patient able to tolerate oral intake:  No, appetite poor.   Pain has adequate pain control using Oral analgesics:  Yes    Pt alert to self. Less agitated today ex w/ movement/ repositioning. Paranoid, still refusing meds. Mostly slept between cares/ PT. Activity/ oral intake encouraged. Turn/ repo. Pure wick in place. IV infusing. Output okay. VSS, stable on RA. Family at bedside this evening. Per PT- discharge recommendation is TCU.

## 2021-07-30 ENCOUNTER — APPOINTMENT (OUTPATIENT)
Dept: PHYSICAL THERAPY | Facility: CLINIC | Age: 86
DRG: 480 | End: 2021-07-30
Payer: COMMERCIAL

## 2021-07-30 LAB
ERYTHROCYTE [DISTWIDTH] IN BLOOD BY AUTOMATED COUNT: 12.8 % (ref 10–15)
GLUCOSE BLDC GLUCOMTR-MCNC: 107 MG/DL (ref 70–99)
GLUCOSE BLDC GLUCOMTR-MCNC: 119 MG/DL (ref 70–99)
GLUCOSE BLDC GLUCOMTR-MCNC: 156 MG/DL (ref 70–99)
GLUCOSE BLDC GLUCOMTR-MCNC: 171 MG/DL (ref 70–99)
HCT VFR BLD AUTO: 26.4 % (ref 35–47)
HGB BLD-MCNC: 8.6 G/DL (ref 11.7–15.7)
MCH RBC QN AUTO: 28.8 PG (ref 26.5–33)
MCHC RBC AUTO-ENTMCNC: 32.6 G/DL (ref 31.5–36.5)
MCV RBC AUTO: 88 FL (ref 78–100)
PLATELET # BLD AUTO: 106 10E3/UL (ref 150–450)
RBC # BLD AUTO: 2.99 10E6/UL (ref 3.8–5.2)
WBC # BLD AUTO: 5.3 10E3/UL (ref 4–11)

## 2021-07-30 PROCEDURE — 85027 COMPLETE CBC AUTOMATED: CPT | Performed by: INTERNAL MEDICINE

## 2021-07-30 PROCEDURE — 258N000003 HC RX IP 258 OP 636: Performed by: INTERNAL MEDICINE

## 2021-07-30 PROCEDURE — 99232 SBSQ HOSP IP/OBS MODERATE 35: CPT | Performed by: INTERNAL MEDICINE

## 2021-07-30 PROCEDURE — 36415 COLL VENOUS BLD VENIPUNCTURE: CPT | Performed by: INTERNAL MEDICINE

## 2021-07-30 PROCEDURE — 97530 THERAPEUTIC ACTIVITIES: CPT | Mod: GP

## 2021-07-30 PROCEDURE — 120N000001 HC R&B MED SURG/OB

## 2021-07-30 PROCEDURE — 250N000013 HC RX MED GY IP 250 OP 250 PS 637: Performed by: INTERNAL MEDICINE

## 2021-07-30 PROCEDURE — 250N000013 HC RX MED GY IP 250 OP 250 PS 637: Performed by: PHYSICIAN ASSISTANT

## 2021-07-30 RX ORDER — POLYETHYLENE GLYCOL 3350 17 G/17G
17 POWDER, FOR SOLUTION ORAL DAILY PRN
DISCHARGE
Start: 2021-07-30

## 2021-07-30 RX ORDER — ASPIRIN 325 MG
325 TABLET, DELAYED RELEASE (ENTERIC COATED) ORAL DAILY
Qty: 30 TABLET | Refills: 0 | DISCHARGE
Start: 2021-07-30

## 2021-07-30 RX ORDER — TRAMADOL HYDROCHLORIDE 50 MG/1
50 TABLET ORAL EVERY 6 HOURS PRN
Qty: 30 TABLET | Refills: 0 | Status: SHIPPED | OUTPATIENT
Start: 2021-07-30 | End: 2021-08-02

## 2021-07-30 RX ORDER — ACETAMINOPHEN 325 MG/1
975 TABLET ORAL EVERY 8 HOURS
DISCHARGE
Start: 2021-07-30

## 2021-07-30 RX ADMIN — CALCIUM CARBONATE 600 MG (1,500 MG)-VITAMIN D3 400 UNIT TABLET 1 TABLET: at 18:24

## 2021-07-30 RX ADMIN — DONEPEZIL HYDROCHLORIDE 20 MG: 10 TABLET ORAL at 20:18

## 2021-07-30 RX ADMIN — SODIUM CHLORIDE, POTASSIUM CHLORIDE, SODIUM LACTATE AND CALCIUM CHLORIDE: 600; 310; 30; 20 INJECTION, SOLUTION INTRAVENOUS at 21:42

## 2021-07-30 RX ADMIN — INSULIN ASPART 1 UNITS: 100 INJECTION, SOLUTION INTRAVENOUS; SUBCUTANEOUS at 21:42

## 2021-07-30 RX ADMIN — BRIMONIDINE TARTRATE 1 DROP: 2 SOLUTION OPHTHALMIC at 11:19

## 2021-07-30 RX ADMIN — Medication 50 MCG: at 18:24

## 2021-07-30 RX ADMIN — POLYETHYLENE GLYCOL 3350 17 G: 17 POWDER, FOR SOLUTION ORAL at 10:00

## 2021-07-30 RX ADMIN — SENNOSIDES AND DOCUSATE SODIUM 1 TABLET: 8.6; 5 TABLET ORAL at 20:18

## 2021-07-30 RX ADMIN — BRIMONIDINE TARTRATE 1 DROP: 2 SOLUTION OPHTHALMIC at 20:18

## 2021-07-30 RX ADMIN — ACETAMINOPHEN 975 MG: 325 TABLET, FILM COATED ORAL at 01:16

## 2021-07-30 RX ADMIN — ASPIRIN 325 MG: 325 TABLET, COATED ORAL at 11:02

## 2021-07-30 RX ADMIN — ACETAMINOPHEN 975 MG: 325 TABLET, FILM COATED ORAL at 11:02

## 2021-07-30 RX ADMIN — SODIUM CHLORIDE, POTASSIUM CHLORIDE, SODIUM LACTATE AND CALCIUM CHLORIDE: 600; 310; 30; 20 INJECTION, SOLUTION INTRAVENOUS at 01:56

## 2021-07-30 RX ADMIN — ACETAMINOPHEN 650 MG: 325 TABLET, FILM COATED ORAL at 20:18

## 2021-07-30 RX ADMIN — CARBOXYMETHYLCELLULOSE SODIUM 2 DROP: 10 GEL OPHTHALMIC at 21:42

## 2021-07-30 ASSESSMENT — ACTIVITIES OF DAILY LIVING (ADL)
ADLS_ACUITY_SCORE: 21
ADLS_ACUITY_SCORE: 15

## 2021-07-30 NOTE — PROGRESS NOTES
[facility]    Hospitalist Progress Note    Assessment & Plan   Eri Puckett is a markedly pleasant 89 year old woman with past medical history that is most notable for advanced chronic dementia, as well as Type 2 Diabetes mellitus and Hodgkins Lymphoma, among others; who presents with mechanical fall causing acute left intertrochanteric hip fracture.     PLAN     Mechanical fall causing acute left intertrochanteric hip fracture:     --status post open reduction and internal fixation 7/27, appreciate orthopedics input  ---Diet, DVT prophylaxis and pain control as per Ortho  acute blood loss anemia  --Hemoglobin dropped from 13-9.5-8.6, hemoglobin has been stable since 7/29, will obtain CBC in a.m.  Rib fracture: Incidentally seen on CXR today, of unclear time frame; she has no current chest pain and this finding could have been due to a previous fall.     -- Aggressive IS   metabolic encephalopathy  Delirium  --- Following surgery, continue as needed Seroquel, continue delirium cares, family updated.  Heart murmur: She was hospitalized here in 2017 after a fall leading to right wrist fracture, and TTE was done and showed preserved LVEF with moderate aortic valve stenosis. Currently her family deny any recent symptoms, of syncope, heart failure or chest pain.    -- Consider follow up TTE after urgent surgery or as an outpatient     Hodgkins Lymphoma: Reportedly treated for recurrence with Rituximab until she decided to stop treatment as it was no longer within her care goals.      Chronic dementia: This is late onset Alzheimers Disease.    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 Diet controlled as an outpatient.    -- ISS insulin while  hospitalized.     Rule Out COVID-19 infection    DVT Prophylaxis: As per Ortho  Code Status: No CPR- Do NOT Intubate     Disposition: Expected discharge to TCU as early as 7/31, discussed with social work..  Ivy Dupont MD  Text Page   (7am to 6pm)    Interval History   Patient mentions sleeping well, she is quite calm, denies any pain, tolerating diet.  -Data reviewed today: I reviewed all new labs and imaging results over the last 24 hours.     Physical Exam   Temp: 99.7  F (37.6  C) Temp src: Oral BP: (!) 141/72 Pulse: 90   Resp: 16 SpO2: 91 % O2 Device: None (Room air)    Vitals:    07/28/21 2100 07/29/21 0547   Weight: 59.4 kg (131 lb) 59.8 kg (131 lb 14.4 oz)     Vital Signs with Ranges  Temp:  [98.2  F (36.8  C)-99.7  F (37.6  C)] 99.7  F (37.6  C)  Pulse:  [68-90] 90  Resp:  [16] 16  BP: (113-141)/(41-72) 141/72  SpO2:  [91 %-94 %] 91 %  I/O last 3 completed shifts:  In: 240 [P.O.:240]  Out: 400 [Urine:400]    Constitutional: Awake, alert, cooperative, no apparent distress  Respiratory: Clear to auscultation bilaterally, no crackles or wheezing  Cardiovascular: Regular rate and rhythm, normal S1 and S2, and no murmur noted  GI: Normal bowel sounds, soft, non-distended, non-tender  Skin/Integumen: Left lower extremity status post surgery  Neuro : moving all 4 extremities, no focal deficit noted     Medications     lactated ringers 50 mL/hr at 07/30/21 0156       acetaminophen  975 mg Oral Q8H     allopurinol  100 mg Oral QAM     aspirin  325 mg Oral Daily     brimonidine  1 drop Both Eyes BID     calcium carbonate 600 mg-vitamin D 400 units  1 tablet Oral BID w/meals     carboxymethylcellulose PF   Both Eyes At Bedtime     donepezil  20 mg Oral At Bedtime     FLUoxetine  20 mg Oral QAM     insulin aspart  1-6 Units Subcutaneous Q4H     omeprazole  20 mg Oral Daily with lunch     polyethylene glycol  17 g Oral Daily     senna-docusate  1 tablet Oral BID     sodium chloride (PF)  3 mL Intracatheter Q8H      vitamin D3  50 mcg Oral Daily with supper       Data   Recent Labs   Lab 07/30/21  0846 07/30/21  0832 07/29/21 2009 07/29/21  1645 07/29/21  0801 07/28/21  1628 07/28/21  0728 07/27/21  0155   WBC 5.3  --   --   --  6.4  --   --  11.4*   HGB 8.6*  --   --   --  8.6*  --  9.7* 13.1   MCV 88  --   --   --  90  --   --  88   *  --   --   --  100*  --   --  154   NA  --   --   --   --  141  --   --  141   POTASSIUM  --   --   --   --  3.5  --   --  3.7   CHLORIDE  --   --   --   --  107  --   --  108   CO2  --   --   --   --  30  --   --  30   BUN  --   --   --   --  18  --   --  15   CR  --   --   --   --  0.87 1.06*  --  0.90   ANIONGAP  --   --   --   --  4  --   --  3   GARRETT  --   --   --   --  7.8*  --   --  8.4*   GLC  --  107* 156* 193* 106*  --   --  207*   ALBUMIN  --   --   --   --   --   --   --  3.0*   PROTTOTAL  --   --   --   --   --   --   --  5.8*   BILITOTAL  --   --   --   --   --   --   --  0.4   ALKPHOS  --   --   --   --   --   --   --  51   ALT  --   --   --   --   --   --   --  23   AST  --   --   --   --   --   --   --  23   TROPONIN  --   --   --   --   --   --   --  <0.015     Recent Labs   Lab 07/30/21  0832 07/29/21 2009 07/29/21 1645 07/29/21  1301 07/29/21  0801   * 156* 193* 117* 106*       Imaging:   No results found for this or any previous visit (from the past 24 hour(s)).

## 2021-07-30 NOTE — PLAN OF CARE
Patient vital signs are at baseline: Yes  Patient able to ambulate as they were prior to admission or with assist devices provided by therapies during their stay:  No,  Reason:  Ext assist w/ staff for bed mobility/ and OOB.   Patient MUST void prior to discharge:  Yes  Patient able to tolerate oral intake:  Yes  Pain has adequate pain control using Oral analgesics:  Yes    Pt remains disoriented x4. Illogical speech. Agitation comes and goes. Behavior has improved today but pt still refusing meds ex sched Aspirin and Tylenol. Pure wick in place. Stable on RA. Turn/ Repo. Small dry drainage to Aquacel to R hip, dressing fully intact. PO/ IS encouraged. Appetite remains poor. PT this am, recommending TCU at discharge.

## 2021-07-30 NOTE — CONSULTS
Care Management Initial Consult    General Information  Assessment completed with: VM-chart review, Other, Children, daughter Alicia spoke to [facility]  Type of CM/SW Visit: Initial Assessment    Primary Care Provider verified and updated as needed:     Readmission within the last 30 days: no previous admission in last 30 days      Reason for Consult: discharge planning  Advance Care Planning:            Communication Assessment  Patient's communication style: spoken language (English or Bilingual)    Hearing Difficulty or Deaf: yes   Wear Glasses or Blind: yes    Cognitive  Cognitive/Neuro/Behavioral: .WDL except  Level of Consciousness: confused, alert  Arousal Level: opens eyes spontaneously  Orientation: disoriented x 4  Mood/Behavior: calm  Best Language: 0 - No aphasia  Speech: clear, illogical    Living Environment:   People in home: alone     Current living Arrangements: apartment      Able to return to prior arrangements: yes       Family/Social Support:  Care provided by: self, child(noel)  Provides care for: no one  Marital Status:   Children          Description of Support System: Supportive, Involved         Current Resources:   Patient receiving home care services:       Community Resources:    Equipment currently used at home: walker, rolling  Supplies currently used at home:      Employment/Financial:  Employment Status: retired        Financial Concerns: No concerns identified           Lifestyle & Psychosocial Needs:  Social Determinants of Health     Tobacco Use: Low Risk      Smoking Tobacco Use: Never Smoker     Smokeless Tobacco Use: Never Used   Alcohol Use:      Frequency of Alcohol Consumption:      Average Number of Drinks:      Frequency of Binge Drinking:    Financial Resource Strain:      Difficulty of Paying Living Expenses:    Food Insecurity:      Worried About Running Out of Food in the Last Year:      Ran Out of Food in the Last Year:    Transportation Needs:      Lack of  Transportation (Medical):      Lack of Transportation (Non-Medical):    Physical Activity:      Days of Exercise per Week:      Minutes of Exercise per Session:    Stress:      Feeling of Stress :    Social Connections:      Frequency of Communication with Friends and Family:      Frequency of Social Gatherings with Friends and Family:      Attends Yarsani Services:      Active Member of Clubs or Organizations:      Attends Club or Organization Meetings:      Marital Status:    Intimate Partner Violence:      Fear of Current or Ex-Partner:      Emotionally Abused:      Physically Abused:      Sexually Abused:    Depression:      PHQ-2 Score:    Housing Stability:      Unable to Pay for Housing in the Last Year:      Number of Places Lived in the Last Year:      Unstable Housing in the Last Year:        Functional Status:  Prior to admission patient needed assistance:              Mental Health Status:          Chemical Dependency Status:                Values/Beliefs:  Spiritual, Cultural Beliefs, Yarsani Practices, Values that affect care:                 Additional Information:  Consult for discharge planning. Writer reviewed chart and TCU recommendations at discharge. Per chart review, patient's daughter Blanca spoke to [facility] regarding discharge planning. Family would like referrals sent to [facility], [facility] and [facility] at discharge. Family will transport if patient can safely get into vehicle. Per chart, family is aware of possible out of pocket cost for transport if needed. Referrals sent via [facility].    FLOR Remy

## 2021-07-30 NOTE — PROGRESS NOTES
Orthopedic Surgery  Eri Puckett  2021  Admit Date:  2021  POD 3 Days Post-Op  S/P Procedure(s):  OPEN REDUCTION INTERNAL FIXATION LEFT FEMUR FRACTURE.    Patient resting comfortably in bed.    Pain controlled.  Tolerating oral intake.    Denies nausea or vomiting  Denies chest pain or shortness of breath  No events overnight.     Alert and orient to person, place, and time.  Vital Sign Ranges  Temperature Temp  Av.7  F (37.1  C)  Min: 98.2  F (36.8  C)  Max: 99.7  F (37.6  C)   Blood pressure Systolic (24hrs), Av , Min:113 , Max:141        Diastolic (24hrs), Av, Min:41, Max:72      Pulse Pulse  Av.5  Min: 68  Max: 90   Respirations Resp  Av  Min: 16  Max: 16   Pulse oximetry SpO2  Av %  Min: 91 %  Max: 94 %       Dressing is clean, dry, and intact.   Minimal erythema of the surrounding skin.   Bilateral calves are soft, non-tender.  Bilateral lower extremity is NVI.  Sensation intact bilateral lower extremities  5/5 motor with resisted dorsi and plantar flexion bilaterally  +Dp pulse    Labs:  Recent Labs   Lab Test 21  0801 21  0155 19  0550   POTASSIUM 3.5 3.7 4.0     Recent Labs   Lab Test 21  0846 21  0801 21  0728   HGB 8.6* 8.6* 9.7*     Recent Labs   Lab Test 06/03/15  1030 13  0946 10/03/13  0945   INR 0.98 0.96 1.01     Recent Labs   Lab Test 21  0846 21  0801 21  0155   * 100* 154     A/P  1. S/p left intertrochanteric femur fracture DHS               Continue ASA for DVT prophylaxis.                Mobilize with PT/OT               50 %WB left LE              Leave dressing intact.                Continue current pain regiment.     2. Disposition              Anticipate d/c to TCU based on progress with PT, placement and medical clearance.    Jacinta Colorado PA-C

## 2021-07-31 LAB
GLUCOSE BLDC GLUCOMTR-MCNC: 109 MG/DL (ref 70–99)
GLUCOSE BLDC GLUCOMTR-MCNC: 109 MG/DL (ref 70–99)
GLUCOSE BLDC GLUCOMTR-MCNC: 121 MG/DL (ref 70–99)
GLUCOSE BLDC GLUCOMTR-MCNC: 136 MG/DL (ref 70–99)
GLUCOSE BLDC GLUCOMTR-MCNC: 169 MG/DL (ref 70–99)
GLUCOSE BLDC GLUCOMTR-MCNC: 177 MG/DL (ref 70–99)
GLUCOSE BLDC GLUCOMTR-MCNC: 88 MG/DL (ref 70–99)

## 2021-07-31 PROCEDURE — 99207 PR MOONLIGHTING INDICATOR: CPT | Performed by: INTERNAL MEDICINE

## 2021-07-31 PROCEDURE — 250N000013 HC RX MED GY IP 250 OP 250 PS 637: Performed by: INTERNAL MEDICINE

## 2021-07-31 PROCEDURE — 250N000013 HC RX MED GY IP 250 OP 250 PS 637: Performed by: HOSPITALIST

## 2021-07-31 PROCEDURE — 99231 SBSQ HOSP IP/OBS SF/LOW 25: CPT | Performed by: INTERNAL MEDICINE

## 2021-07-31 PROCEDURE — 120N000001 HC R&B MED SURG/OB

## 2021-07-31 PROCEDURE — 250N000013 HC RX MED GY IP 250 OP 250 PS 637: Performed by: PHYSICIAN ASSISTANT

## 2021-07-31 RX ADMIN — ASPIRIN 325 MG: 325 TABLET, COATED ORAL at 08:02

## 2021-07-31 RX ADMIN — BRIMONIDINE TARTRATE 1 DROP: 2 SOLUTION OPHTHALMIC at 20:24

## 2021-07-31 RX ADMIN — Medication 50 MCG: at 17:12

## 2021-07-31 RX ADMIN — OMEPRAZOLE 20 MG: 20 CAPSULE, DELAYED RELEASE ORAL at 13:00

## 2021-07-31 RX ADMIN — ACETAMINOPHEN 650 MG: 325 TABLET, FILM COATED ORAL at 13:00

## 2021-07-31 RX ADMIN — FLUOXETINE HYDROCHLORIDE 20 MG: 20 CAPSULE ORAL at 08:02

## 2021-07-31 RX ADMIN — CARBOXYMETHYLCELLULOSE SODIUM 2 DROP: 10 GEL OPHTHALMIC at 21:39

## 2021-07-31 RX ADMIN — INSULIN ASPART 1 UNITS: 100 INJECTION, SOLUTION INTRAVENOUS; SUBCUTANEOUS at 13:00

## 2021-07-31 RX ADMIN — MELATONIN 5 MG TABLET 5 MG: at 21:39

## 2021-07-31 RX ADMIN — CALCIUM CARBONATE 600 MG (1,500 MG)-VITAMIN D3 400 UNIT TABLET 1 TABLET: at 08:02

## 2021-07-31 RX ADMIN — ACETAMINOPHEN 650 MG: 325 TABLET, FILM COATED ORAL at 08:02

## 2021-07-31 RX ADMIN — CALCIUM CARBONATE 600 MG (1,500 MG)-VITAMIN D3 400 UNIT TABLET 1 TABLET: at 17:12

## 2021-07-31 RX ADMIN — TRAMADOL HYDROCHLORIDE 50 MG: 50 TABLET, FILM COATED ORAL at 20:30

## 2021-07-31 RX ADMIN — INSULIN ASPART 1 UNITS: 100 INJECTION, SOLUTION INTRAVENOUS; SUBCUTANEOUS at 20:23

## 2021-07-31 RX ADMIN — ALLOPURINOL 100 MG: 100 TABLET ORAL at 08:02

## 2021-07-31 RX ADMIN — BRIMONIDINE TARTRATE 1 DROP: 2 SOLUTION OPHTHALMIC at 08:13

## 2021-07-31 RX ADMIN — DONEPEZIL HYDROCHLORIDE 20 MG: 10 TABLET ORAL at 21:39

## 2021-07-31 ASSESSMENT — MIFFLIN-ST. JEOR: SCORE: 955.05

## 2021-07-31 ASSESSMENT — ACTIVITIES OF DAILY LIVING (ADL)
ADLS_ACUITY_SCORE: 15
ADLS_ACUITY_SCORE: 19

## 2021-07-31 NOTE — PLAN OF CARE
Patient vital signs are at baseline: Yes  Patient able to ambulate as they were prior to admission or with assist devices provided by therapies during their stay:  No,  Reason:  up with assist of 2 and lift  Patient MUST void prior to discharge:  Yes, incontinent, external catheter/pure wick in place  Patient able to tolerate oral intake:  Yes  Pain has adequate pain control using Oral analgesics:  Yes, taking tylenol for pain.

## 2021-07-31 NOTE — PLAN OF CARE
Patient vital signs are at baseline: Yes  Patient able to ambulate as they were prior to admission or with assist devices provided by therapies during their stay:  No,  Reason:  assist of two and lift  Patient MUST void prior to discharge:  Yes  Patient able to tolerate oral intake:  Yes  Pain has adequate pain control using Oral analgesics:  Yes    Oriented to self, resistant to cares and repositioning. Moves all extremities purposefully. Dressing with small amount of dried drainage. Voiding adequately with Purewick. BM x1 overnight.  IV access lost, unable to restart overnight.

## 2021-07-31 NOTE — PLAN OF CARE
Patient vital signs are at baseline: Yes  Patient able to ambulate as they were prior to admission or with assist devices provided by therapies during their stay:  No,  Reason:  assist of 2 and lift  Patient MUST void prior to discharge:  Yes, external catheter in place   Patient able to tolerate oral intake:  Yes  Pain has adequate pain control using Oral analgesics:  Yes, taking PRN tylenol.    Patient is confused, compliant with meds this shift. Denies chest pain or SOB. TCU pending. Small drainage on dressing. Will continue to monitor.

## 2021-07-31 NOTE — PROGRESS NOTES
[facility]  Hospitalist Progress Note for 7/31/2021       Assessment and Plan:   Eri Puckett is a markedly pleasant 89 year old woman with past medical history that is most notable for advanced chronic dementia, as well as Type 2 Diabetes mellitus and Hodgkins Lymphoma, among others; who presents with mechanical fall causing acute left intertrochanteric hip fracture.    Mechanical fall causing acute left intertrochanteric hip fracture:     --status post open reduction and internal fixation 7/27, appreciate orthopedics input  ---Diet, DVT prophylaxis and pain control as per Ortho  acute blood loss anemia  --Hemoglobin dropped from 13-9.5-8.6, hemoglobin has been stable since 7/29, will obtain CBC in a.m.  Rib fracture: Incidentally seen on CXR today, of unclear time frame; she has no current chest pain and this finding could have been due to a previous fall.     -- Aggressive IS   metabolic encephalopathy  Delirium  --- Following surgery, continue as needed Seroquel, continue delirium cares, family updated.  Heart murmur: She was hospitalized here in 2017 after a fall leading to right wrist fracture, and TTE was done and showed preserved LVEF with moderate aortic valve stenosis. Currently her family deny any recent symptoms, of syncope, heart failure or chest pain.    -- Consider follow up TTE after urgent surgery or as an outpatient     Hodgkins Lymphoma: Reportedly treated for recurrence with Rituximab until she decided to stop treatment as it was no longer within her care goals.      Chronic dementia: This is late onset Alzheimers Disease.    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 Diet controlled as an outpatient.    -- ISS insulin while  "hospitalized.     Rule Out COVID-19 infection     DVT Prophylaxis: As per Ortho  Code Status: No CPR- Do NOT Intubate      Disposition: Expected discharge to TCU, pending bed availability. SW following.    Carolyn Garcia MD.  Hospitalist [phone] (7am -6 pm)               Interval History:   no new complaints              Medications:       allopurinol  100 mg Oral QAM     aspirin  325 mg Oral Daily     brimonidine  1 drop Both Eyes BID     calcium carbonate 600 mg-vitamin D 400 units  1 tablet Oral BID w/meals     carboxymethylcellulose PF   Both Eyes At Bedtime     donepezil  20 mg Oral At Bedtime     FLUoxetine  20 mg Oral QAM     insulin aspart  1-6 Units Subcutaneous Q4H     omeprazole  20 mg Oral Daily with lunch     polyethylene glycol  17 g Oral Daily     senna-docusate  1 tablet Oral BID     sodium chloride (PF)  3 mL Intracatheter Q8H     vitamin D3  50 mcg Oral Daily with supper     [DISCONTINUED] acetaminophen **OR** acetaminophen, acetaminophen, sore throat lozenge, bisacodyl, glucose **OR** dextrose **OR** glucagon, HYDROmorphone **OR** HYDROmorphone, hydrOXYzine, lidocaine 4%, lidocaine (buffered or not buffered), loperamide, magnesium hydroxide, melatonin, naloxone **OR** naloxone **OR** naloxone **OR** naloxone, ondansetron **OR** ondansetron, polyethylene glycol, prochlorperazine **OR** prochlorperazine, QUEtiapine, sodium chloride (PF), traMADol               Physical Exam:   Blood pressure (!) 149/61, pulse 56, temperature 98.4  F (36.9  C), temperature source Oral, resp. rate 18, height 1.51 m (4' 11.45\"), weight 63.7 kg (140 lb 8 oz), SpO2 92 %.  Wt Readings from Last 4 Encounters:   21 63.7 kg (140 lb 8 oz)   19 67.6 kg (149 lb)   10/14/17 79.4 kg (175 lb 0.7 oz)   13 61.2 kg (135 lb)         Vital Sign Ranges  Temperature Temp  Av.1  F (36.7  C)  Min: 97.9  F (36.6  C)  Max: 98.4  F (36.9  C)   Blood pressure Systolic (24hrs), Av , Min:137 , Max:149        " Diastolic (24hrs), Av, Min:59, Max:61      Pulse Pulse  Av.7  Min: 56  Max: 66   Respirations Resp  Av.4  Min: 16  Max: 18   Pulse oximetry SpO2  Av.5 %  Min: 92 %  Max: 93 %         Intake/Output Summary (Last 24 hours) at 2021 1754  Last data filed at 2021 1437  Gross per 24 hour   Intake 915 ml   Output 950 ml   Net -35 ml       Constitutional: Awake, alert, cooperative, no apparent distress   Lungs: Clear to auscultation bilaterally, no crackles or wheezing   Cardiovascular: Regular rate and rhythm, normal S1 and S2, and no murmur noted   Abdomen: Normal bowel sounds, soft, non-distended, non-tender   Skin: No rashes, no cyanosis, no edema               Data:   All laboratory data reviewed

## 2021-07-31 NOTE — PROGRESS NOTES
Ortho  S/p ORIF left femur; POD#4    No verbal complaints  Resting comfortable  Denies CP, SOB  NAD  Dry dressing  Calves soft, NT  CMS intact    Plan:  S/p Left DHS; POD#4  PT/OT  50% WB left LE  ASA for DVT prophylaxis  TCU when bed available and cleared medically    Brenda Combs PA-C  9:22 AM

## 2021-07-31 NOTE — PROGRESS NOTES
Care Management Follow Up    Length of Stay (days): 4    Expected Discharge Date: 07/31/2021     Concerns to be Addressed: discharge planning     Patient plan of care discussed at interdisciplinary rounds: Yes    Anticipated Discharge Disposition: Transitional Care     Anticipated Discharge Services:    Anticipated Discharge DME:      Patient/family educated on Medicare website which has current facility and service quality ratings:    Education Provided on the Discharge Plan:    Patient/Family in Agreement with the Plan: yes    Referrals Placed by CM/SW: Post Acute Facilities  Private pay costs discussed: Not applicable    Additional Information:  Call placed to [facility], message left.    Call received from [facility] and patient declined.    Call placed to [facility] and no beds are expected over the weekend    [facility] is not taking admissions over the weekend.     [facility] declined in [facility].    Call placed to Blanca to discuss. Referrals sent to [facility] [place] and [facility] via [facility]. Blanca noted that they are looking at facilities for LTC for patient and beging the process for waivered services.  Will update Blanca with facility placement.      FLOR Remy

## 2021-08-01 LAB
GLUCOSE BLDC GLUCOMTR-MCNC: 101 MG/DL (ref 70–99)
GLUCOSE BLDC GLUCOMTR-MCNC: 103 MG/DL (ref 70–99)
GLUCOSE BLDC GLUCOMTR-MCNC: 113 MG/DL (ref 70–99)
GLUCOSE BLDC GLUCOMTR-MCNC: 134 MG/DL (ref 70–99)
GLUCOSE BLDC GLUCOMTR-MCNC: 155 MG/DL (ref 70–99)
GLUCOSE BLDC GLUCOMTR-MCNC: 98 MG/DL (ref 70–99)

## 2021-08-01 PROCEDURE — 120N000001 HC R&B MED SURG/OB

## 2021-08-01 PROCEDURE — 250N000013 HC RX MED GY IP 250 OP 250 PS 637: Performed by: INTERNAL MEDICINE

## 2021-08-01 PROCEDURE — 99232 SBSQ HOSP IP/OBS MODERATE 35: CPT | Performed by: INTERNAL MEDICINE

## 2021-08-01 PROCEDURE — 99207 PR MOONLIGHTING INDICATOR: CPT | Performed by: INTERNAL MEDICINE

## 2021-08-01 PROCEDURE — 250N000013 HC RX MED GY IP 250 OP 250 PS 637: Performed by: PHYSICIAN ASSISTANT

## 2021-08-01 RX ORDER — MULTIPLE VITAMINS W/ MINERALS TAB 9MG-400MCG
1 TAB ORAL EVERY MORNING
Status: DISCONTINUED | OUTPATIENT
Start: 2021-08-02 | End: 2021-08-06 | Stop reason: HOSPADM

## 2021-08-01 RX ORDER — FERROUS GLUCONATE 324(38)MG
324 TABLET ORAL
Status: DISCONTINUED | OUTPATIENT
Start: 2021-08-01 | End: 2021-08-06 | Stop reason: HOSPADM

## 2021-08-01 RX ORDER — AMLODIPINE BESYLATE 5 MG/1
5 TABLET ORAL DAILY
Status: DISCONTINUED | OUTPATIENT
Start: 2021-08-01 | End: 2021-08-06 | Stop reason: HOSPADM

## 2021-08-01 RX ORDER — ROSUVASTATIN CALCIUM 10 MG/1
10 TABLET, COATED ORAL AT BEDTIME
Status: DISCONTINUED | OUTPATIENT
Start: 2021-08-01 | End: 2021-08-06 | Stop reason: HOSPADM

## 2021-08-01 RX ORDER — LANOLIN ALCOHOL/MO/W.PET/CERES
1000 CREAM (GRAM) TOPICAL
Status: DISCONTINUED | OUTPATIENT
Start: 2021-08-01 | End: 2021-08-06 | Stop reason: HOSPADM

## 2021-08-01 RX ADMIN — CYANOCOBALAMIN TAB 1000 MCG 1000 MCG: 1000 TAB at 17:03

## 2021-08-01 RX ADMIN — FERROUS GLUCONATE 324 MG: 324 TABLET ORAL at 17:02

## 2021-08-01 RX ADMIN — OMEPRAZOLE 20 MG: 20 CAPSULE, DELAYED RELEASE ORAL at 13:01

## 2021-08-01 RX ADMIN — ACETAMINOPHEN 650 MG: 325 TABLET, FILM COATED ORAL at 09:25

## 2021-08-01 RX ADMIN — FLUOXETINE HYDROCHLORIDE 20 MG: 20 CAPSULE ORAL at 09:25

## 2021-08-01 RX ADMIN — ALLOPURINOL 100 MG: 100 TABLET ORAL at 09:25

## 2021-08-01 RX ADMIN — Medication 50 MCG: at 17:03

## 2021-08-01 RX ADMIN — INSULIN ASPART 1 UNITS: 100 INJECTION, SOLUTION INTRAVENOUS; SUBCUTANEOUS at 17:09

## 2021-08-01 RX ADMIN — CALCIUM CARBONATE 600 MG (1,500 MG)-VITAMIN D3 400 UNIT TABLET 1 TABLET: at 17:03

## 2021-08-01 RX ADMIN — CALCIUM CARBONATE 600 MG (1,500 MG)-VITAMIN D3 400 UNIT TABLET 1 TABLET: at 09:25

## 2021-08-01 RX ADMIN — AMLODIPINE BESYLATE 5 MG: 5 TABLET ORAL at 17:02

## 2021-08-01 RX ADMIN — BRIMONIDINE TARTRATE 1 DROP: 2 SOLUTION OPHTHALMIC at 09:25

## 2021-08-01 RX ADMIN — ASPIRIN 325 MG: 325 TABLET, COATED ORAL at 09:25

## 2021-08-01 ASSESSMENT — ACTIVITIES OF DAILY LIVING (ADL)
ADLS_ACUITY_SCORE: 19

## 2021-08-01 NOTE — PROGRESS NOTES
Ortho  S/p ORIF left femur; POD#5     Resting comfortable  Denies CP, SOB  NAD  Dry dressing  Calves soft, NT  CMS intact     Plan:  S/p Left DHS; POD#5  PT/OT, 50% WB left LE  ASA for DVT prophylaxis  TCU when bed available and cleared medically    Brenda Combs PA-C  8:24 AM

## 2021-08-01 NOTE — PLAN OF CARE
Patient vital signs are at baseline: Yes   Patient able to ambulate as they were prior to admission or with assist devices provided by therapies during their stay:  No,  Reason:  Assist of 2 and lift, attempted to sit @ bedside, pt did not tolerated.   Patient MUST void prior to discharge:  Yes, incontinent, external catheter/purewick in place  Patient able to tolerate oral intake:  Yes  Pain has adequate pain control using Oral analgesics:  Yes, taking PRN tylenol     Redness blanchable on coccyx, applied mepilex. Complains of intermittent pain on L rib cage, hospitalist aware, no new order. Will continue to monitor.

## 2021-08-01 NOTE — PROVIDER NOTIFICATION
Paged hospitalist regarding intermittent pain on Left ribcage with positioning. Able to sleep well.

## 2021-08-01 NOTE — PLAN OF CARE
Alert to self only, confused. A-2 with lift. Incontinent of bowel and bladder, voiding per purewick. CMS intact. Lt hip dressing has moderate amount of dried drainage that was marked. C/o hip pain once this shift gave PRN Tramadol with relief.

## 2021-08-01 NOTE — PLAN OF CARE
Patient alert to self, blood sugar check every 4 hours, reading wnl, no coverage over the night, pure wick in place, 600cc of urine output, dressing marked.

## 2021-08-01 NOTE — PROGRESS NOTES
[facility]  Hospitalist Progress Note for 8/1/2021       Assessment and Plan:   Eri Puckett is a markedly pleasant 89 year old woman with past medical history that is most notable for advanced chronic dementia, as well as Type 2 Diabetes mellitus and Hodgkins Lymphoma, among others; who presents with mechanical fall causing acute left intertrochanteric hip fracture.    Mechanical fall causing acute left intertrochanteric hip fracture:   -status post open reduction and internal fixation 7/27, appreciate orthopedics input  -Diet, DVT prophylaxis (on ASA) and pain control as per Ortho     Acute blood loss anemia  --Hemoglobin dropped from 13-9.5-8.6, hemoglobin has been stable since 7/29, will obtain CBC in a.m.  Start Ferrous gluconate   -repeat hgb as outpt at f/u with PCP    Rib fracture: Incidentally seen on CXR today, of unclear time frame; she has no current chest pain and this finding could have been due to a previous fall.   - Aggressive IS    Metabolic encephalopathy  Delirium- improved  Chronic dementia: onset Alzheimers Disease.  - Following surgery, continue as needed Seroquel, continue delirium cares, family updated.  -Continue PTA Aricept    Heart murmur: She was hospitalized here in 2017 after a fall leading to right wrist fracture, and TTE was done and showed preserved LVEF with moderate aortic valve stenosis. Currently her family deny any recent symptoms, of syncope, heart failure or chest pain.  - unable to reach family by phone  - Consider follow up TTE after urgent surgery or as an outpatient     Hodgkins Lymphoma: Reportedly treated for recurrence with Rituximab until she decided to stop treatment as it was no longer within her care goals.      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 Diet controlled as an outpatient.  -  "ISS insulin while hospitalized.     Rule Out COVID-19 infection     DVT Prophylaxis: As per Ortho  Code Status: No CPR- Do NOT Intubate      Disposition: Expected discharge to TCU, pending bed availability. SW following.    Carolyn Garcia MD.  Hospitalist [phone] (7am -6 pm)               Interval History:   Intermittent pain in R lat rib cage with movement per RN. Pt denies any pain- no persistent pain on exam.                Medications:       allopurinol  100 mg Oral QAM     aspirin  325 mg Oral Daily     brimonidine  1 drop Both Eyes BID     calcium carbonate 600 mg-vitamin D 400 units  1 tablet Oral BID w/meals     carboxymethylcellulose PF   Both Eyes At Bedtime     donepezil  20 mg Oral At Bedtime     FLUoxetine  20 mg Oral QAM     insulin aspart  1-6 Units Subcutaneous Q4H     omeprazole  20 mg Oral Daily with lunch     polyethylene glycol  17 g Oral Daily     senna-docusate  1 tablet Oral BID     sodium chloride (PF)  3 mL Intracatheter Q8H     vitamin D3  50 mcg Oral Daily with supper     [DISCONTINUED] acetaminophen **OR** acetaminophen, acetaminophen, sore throat lozenge, bisacodyl, glucose **OR** dextrose **OR** glucagon, HYDROmorphone **OR** HYDROmorphone, hydrOXYzine, lidocaine 4%, lidocaine (buffered or not buffered), loperamide, magnesium hydroxide, melatonin, naloxone **OR** naloxone **OR** naloxone **OR** naloxone, ondansetron **OR** ondansetron, polyethylene glycol, prochlorperazine **OR** prochlorperazine, QUEtiapine, sodium chloride (PF), traMADol               Physical Exam:   Blood pressure 121/49, pulse 58, temperature 97.6  F (36.4  C), temperature source Oral, resp. rate 18, height 1.51 m (4' 11.45\"), weight 63.7 kg (140 lb 8 oz), SpO2 92 %.  Wt Readings from Last 4 Encounters:   21 63.7 kg (140 lb 8 oz)   19 67.6 kg (149 lb)   10/14/17 79.4 kg (175 lb 0.7 oz)   13 61.2 kg (135 lb)         Vital Sign Ranges  Temperature Temp  Av.1  F (36.7  C)  Min: 97.9  F " (36.6  C)  Max: 98.4  F (36.9  C)   Blood pressure Systolic (24hrs), Av , Min:137 , Max:149        Diastolic (24hrs), Av, Min:59, Max:61      Pulse Pulse  Av.7  Min: 56  Max: 66   Respirations Resp  Av.4  Min: 16  Max: 18   Pulse oximetry SpO2  Av.5 %  Min: 92 %  Max: 93 %         Intake/Output Summary (Last 24 hours) at 2021 1754  Last data filed at 2021 1437  Gross per 24 hour   Intake 915 ml   Output 950 ml   Net -35 ml       Constitutional: [place], awake, alert, cooperative, no apparent distress. Pleasantly confused   Lungs:  Chest: Clear to auscultation bilaterally, no crackles or wheezing  Sl tenderness only on initial exam L lat ribs.    Cardiovascular: Regular rate and rhythm, normal S1 and S2, and no murmur noted   Abdomen: Normal bowel sounds, soft, non-distended, non-tender   Skin: No rashes, no cyanosis, no edema               Data:   All laboratory data reviewed

## 2021-08-02 ENCOUNTER — APPOINTMENT (OUTPATIENT)
Dept: PHYSICAL THERAPY | Facility: CLINIC | Age: 86
DRG: 480 | End: 2021-08-02
Payer: COMMERCIAL

## 2021-08-02 LAB
DEPRECATED CALCIDIOL+CALCIFEROL SERPL-MC: <57 UG/L (ref 20–75)
GLUCOSE BLDC GLUCOMTR-MCNC: 103 MG/DL (ref 70–99)
GLUCOSE BLDC GLUCOMTR-MCNC: 104 MG/DL (ref 70–99)
GLUCOSE BLDC GLUCOMTR-MCNC: 109 MG/DL (ref 70–99)
GLUCOSE BLDC GLUCOMTR-MCNC: 143 MG/DL (ref 70–99)
VITAMIN D2 SERPL-MCNC: <5 UG/L
VITAMIN D3 SERPL-MCNC: 52 UG/L

## 2021-08-02 PROCEDURE — 250N000013 HC RX MED GY IP 250 OP 250 PS 637: Performed by: INTERNAL MEDICINE

## 2021-08-02 PROCEDURE — 120N000001 HC R&B MED SURG/OB

## 2021-08-02 PROCEDURE — 99232 SBSQ HOSP IP/OBS MODERATE 35: CPT | Performed by: INTERNAL MEDICINE

## 2021-08-02 PROCEDURE — 97530 THERAPEUTIC ACTIVITIES: CPT | Mod: GP

## 2021-08-02 PROCEDURE — 250N000013 HC RX MED GY IP 250 OP 250 PS 637: Performed by: PHYSICIAN ASSISTANT

## 2021-08-02 RX ADMIN — CARBOXYMETHYLCELLULOSE SODIUM 2 DROP: 10 GEL OPHTHALMIC at 21:37

## 2021-08-02 RX ADMIN — ACETAMINOPHEN 650 MG: 325 TABLET, FILM COATED ORAL at 11:41

## 2021-08-02 RX ADMIN — TRAMADOL HYDROCHLORIDE 50 MG: 50 TABLET, FILM COATED ORAL at 05:33

## 2021-08-02 RX ADMIN — ACETAMINOPHEN 650 MG: 325 TABLET, FILM COATED ORAL at 23:26

## 2021-08-02 RX ADMIN — ROSUVASTATIN CALCIUM 10 MG: 10 TABLET, FILM COATED ORAL at 21:37

## 2021-08-02 RX ADMIN — BRIMONIDINE TARTRATE 1 DROP: 2 SOLUTION OPHTHALMIC at 20:22

## 2021-08-02 RX ADMIN — DONEPEZIL HYDROCHLORIDE 20 MG: 10 TABLET ORAL at 21:37

## 2021-08-02 RX ADMIN — SENNOSIDES AND DOCUSATE SODIUM 1 TABLET: 8.6; 5 TABLET ORAL at 20:22

## 2021-08-02 ASSESSMENT — ACTIVITIES OF DAILY LIVING (ADL)
ADLS_ACUITY_SCORE: 15
ADLS_ACUITY_SCORE: 19
ADLS_ACUITY_SCORE: 19
ADLS_ACUITY_SCORE: 15
ADLS_ACUITY_SCORE: 15
ADLS_ACUITY_SCORE: 19

## 2021-08-02 ASSESSMENT — MIFFLIN-ST. JEOR: SCORE: 911.05

## 2021-08-02 NOTE — PLAN OF CARE
Patient alert self, was awake most of the night, no iv access, patient was agitated, decline some care, unable to get her vital signs, pure wick in place, adequate urine output.

## 2021-08-02 NOTE — PLAN OF CARE
Pt alert so self only. CMS intact. VSS. Up w/ A2 to chair for meals today. Taking tylenol for pain. Incontinent of bowel and bladder today. Continue to monitor.

## 2021-08-02 NOTE — PLAN OF CARE
Patient A&0x2. She is confused to time place and situation. Not able to reorient. She is up with Lift. Turn q 2 Hrs. Blanchable redness on coccyx. Meplex in place, Dressing on L hip has dried drainage. No new. L hip is painful with movement. L hip has lisa mild swelling and bruising noted. CMS intact. DP +2. Denies numbness and tingling. Tolerating regular diet. Appears to have sundowners as she was very pleasant earlier in the shift and became very agitated and combative later in the evening. She is incontinent of bladder and bowel - PureWick in place.

## 2021-08-02 NOTE — PROGRESS NOTES
Care Management Follow Up    Length of Stay (days): 6    Expected Discharge Date: 08/02/2021     Concerns to be Addressed: discharge planning     Patient plan of care discussed at interdisciplinary rounds: Yes    Anticipated Discharge Disposition: Transitional Care     Anticipated Discharge Services:    Anticipated Discharge DME:      Patient/family educated on Medicare website which has current facility and service quality ratings:    Education Provided on the Discharge Plan:    Patient/Family in Agreement with the Plan: yes    Referrals Placed by CM/SW: Post Acute Facilities  Private pay costs discussed: Not applicable    Additional Information:  Calls out to pending referrals. Bed status still unknown. Writer spoke to patient's daughter who stated that family called front door services with [facility] and are requesting assistance with a [facility] application. Writer reached out to Alexey with [employer] via email to request assistance with this.     Will continue to follow for safe discharge.       FLOR Remy

## 2021-08-02 NOTE — PROGRESS NOTES
[facility]    Medicine Progress Note - Hospitalist Service       Date of Admission:  7/27/2021    Assessment & Plan         Eri Puckett is a markedly pleasant 89 year old woman with past medical history that is most notable for advanced chronic dementia, as well as Type 2 Diabetes mellitus and Hodgkins Lymphoma, among others; who presents with mechanical fall causing acute left intertrochanteric hip fracture.     Mechanical fall causing acute left intertrochanteric hip fracture  Status post open reduction and internal fixation 7/27, appreciate orthopedics input  - Diet, DVT prophylaxis (on ASA) and pain control as per Ortho     Acute blood loss anemia  Hemoglobin dropped from 13-9.5-8.6, hemoglobin has been stable since 7/29, will obtain CBC in a.m.  Start Ferrous gluconate   - Hgb as outpt at f/u with PCP     Rib fracture  Incidentally seen on CXR today, of unclear time frame; she has no current chest pain and this finding could have been due to a previous fall.   - Aggressive IS     Metabolic encephalopathy  Delirium- improved  Chronic dementia: onset Alzheimers Disease.  - Following surgery, continue as needed Seroquel, continue delirium cares, family updated  - Continue PTA Aricept     Heart murmur  She was hospitalized here in 2017 after a fall leading to right wrist fracture, and TTE was done and showed preserved LVEF with moderate aortic valve stenosis. Currently her family deny any recent symptoms, of syncope, heart failure or chest pain.  - Consider follow up TTE after urgent surgery or as an outpatient     Hodgkins Lymphoma  Reportedly treated for recurrence with Rituximab until she decided to stop treatment as it was no longer within her care goals.      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 Diet controlled as an  outpatient.  - Stopped ISS as patient has not required insulin in over 24 hours.         Diet: Advance Diet as Tolerated: Regular Diet Adult  Advance Diet as Tolerated    DVT Prophylaxis: Pneumatic Compression Devices  Perrin Catheter: Not present  Central Lines: None  Code Status: No CPR- Do NOT Intubate      Disposition Plan   Expected discharge: 08/02/2021 recommended to transitional care unit once safe disposition plan/ TCU bed available.     The patient's care was discussed with the Bedside Nurse, Care Coordinator/ and Patient.    Bryon Cline DO  Hospitalist Service  [facility]  Securely message with the Vocera Web Console (learn more here)  Text page via [employer] Paging/Directory      Clinically Significant Risk Factors Present on Admission                ______________________________________________________________________    Interval History   Patient seen and examined.  No acute events over night.  No fevers or chills.  Hip pain is well controlled.  No difficulty breathing.  No nausea or vomiting.    Data reviewed today: I reviewed all medications, new labs and imaging results over the last 24 hours. I personally reviewed no images or EKG's today.    Physical Exam   Vital Signs: Temp: 98.7  F (37.1  C) Temp src: Oral BP: 137/71 Pulse: 81   Resp: 16 SpO2: 96 % O2 Device: None (Room air)    Weight: 130 lbs 12.8 oz  General Appearance: Resting comfortably in chair.  NAD   Respiratory: Clear to auscultation.  No respiratory distress  Cardiovascular: RRR.  No obvious murmurs  GI: Soft.  Non-distended  Skin: No obvious rashes or cyanosis to exposed skin  Other: No edema     Data   Recent Labs   Lab 08/02/21  1302 08/02/21  0938 08/02/21  0416 07/30/21  0846 07/29/21  0801 07/28/21  1628 07/28/21  0728 07/27/21  0155   WBC  --   --   --  5.3 6.4  --   --  11.4*   HGB  --   --   --  8.6* 8.6*  --  9.7* 13.1   MCV  --   --   --  88 90  --   --  88   PLT  --   --   --  106*  100*  --   --  154   NA  --   --   --   --  141  --   --  141   POTASSIUM  --   --   --   --  3.5  --   --  3.7   CHLORIDE  --   --   --   --  107  --   --  108   CO2  --   --   --   --  30  --   --  30   BUN  --   --   --   --  18  --   --  15   CR  --   --   --   --  0.87 1.06*  --  0.90   ANIONGAP  --   --   --   --  4  --   --  3   GARRETT  --   --   --   --  7.8*  --   --  8.4*   * 109* 103*  --  106*  --   --  207*   ALBUMIN  --   --   --   --   --   --   --  3.0*   PROTTOTAL  --   --   --   --   --   --   --  5.8*   BILITOTAL  --   --   --   --   --   --   --  0.4   ALKPHOS  --   --   --   --   --   --   --  51   ALT  --   --   --   --   --   --   --  23   AST  --   --   --   --   --   --   --  23   TROPONIN  --   --   --   --   --   --   --  <0.015     No results found for this or any previous visit (from the past 24 hour(s)).

## 2021-08-02 NOTE — PROGRESS NOTES
Orthopedic Surgery  Eri Puckett  2021  Admit Date:  2021  POD: 6 Days Post-Op   Procedure(s):  OPEN REDUCTION INTERNAL FIXATION LEFT FEMUR FRACTURE.    Patient resting comfortably in bed.    Pain controlled.  Tolerating oral intake.    Denies nausea or vomiting  Denies chest pain or shortness of breath    Vital Sign Ranges  Temperature Temp  Av.2  F (36.8  C)  Min: 97.7  F (36.5  C)  Max: 98.7  F (37.1  C)   Blood pressure Systolic (24hrs), Av , Min:137 , Max:181        Diastolic (24hrs), Av, Min:59, Max:71      Pulse Pulse  Av.3  Min: 56  Max: 81   Respirations Resp  Av.5  Min: 16  Max: 18   Pulse oximetry SpO2  Av.3 %  Min: 95 %  Max: 98 %       Dressing is clean, dry, and intact.   Minimal erythema of the surrounding skin.   Bilateral calves are soft, non-tender.  Left lower extremity is NVI.  Sensation intact bilateral lower extremities  Patient able to resist dorsi and plantar flexion bilaterally  +Dp pulse    Labs:  Recent Labs   Lab Test 21  0801 21  0155 19  0550   POTASSIUM 3.5 3.7 4.0     Recent Labs   Lab Test 21  0846 21  0801 21  0728   HGB 8.6* 8.6* 9.7*     Recent Labs   Lab Test 06/03/15  1030 13  0946 10/03/13  0945   INR 0.98 0.96 1.01     Recent Labs   Lab Test 21  0846 21  0801 21  0155   * 100* 154       1. Plan:   Continue ASA for DVT prophylaxis.     Mobilize with PT/OT    50% WB Left LE with walker.     Continue current pain regiment.   Dressings: Keep intact.  Change if >60% saturated    2. Disposition   Anticipate d/c to TCU when medically cleared and progressing in PT.    Consuelo Tan PA-C

## 2021-08-03 LAB — SARS-COV-2 RNA RESP QL NAA+PROBE: NEGATIVE

## 2021-08-03 PROCEDURE — 120N000001 HC R&B MED SURG/OB

## 2021-08-03 PROCEDURE — 250N000013 HC RX MED GY IP 250 OP 250 PS 637: Performed by: PHYSICIAN ASSISTANT

## 2021-08-03 PROCEDURE — 250N000013 HC RX MED GY IP 250 OP 250 PS 637: Performed by: INTERNAL MEDICINE

## 2021-08-03 PROCEDURE — 99232 SBSQ HOSP IP/OBS MODERATE 35: CPT | Performed by: INTERNAL MEDICINE

## 2021-08-03 PROCEDURE — 87635 SARS-COV-2 COVID-19 AMP PRB: CPT | Performed by: INTERNAL MEDICINE

## 2021-08-03 RX ADMIN — HYDROXYZINE HYDROCHLORIDE 10 MG: 10 TABLET ORAL at 08:59

## 2021-08-03 RX ADMIN — Medication 50 MCG: at 16:52

## 2021-08-03 RX ADMIN — CALCIUM CARBONATE 600 MG (1,500 MG)-VITAMIN D3 400 UNIT TABLET 1 TABLET: at 16:52

## 2021-08-03 RX ADMIN — CALCIUM CARBONATE 600 MG (1,500 MG)-VITAMIN D3 400 UNIT TABLET 1 TABLET: at 09:00

## 2021-08-03 RX ADMIN — ACETAMINOPHEN 650 MG: 325 TABLET, FILM COATED ORAL at 08:59

## 2021-08-03 RX ADMIN — ALLOPURINOL 100 MG: 100 TABLET ORAL at 09:00

## 2021-08-03 RX ADMIN — DONEPEZIL HYDROCHLORIDE 20 MG: 10 TABLET ORAL at 22:21

## 2021-08-03 RX ADMIN — CARBOXYMETHYLCELLULOSE SODIUM 2 DROP: 10 GEL OPHTHALMIC at 21:48

## 2021-08-03 RX ADMIN — ROSUVASTATIN CALCIUM 10 MG: 10 TABLET, FILM COATED ORAL at 21:48

## 2021-08-03 RX ADMIN — HYDROXYZINE HYDROCHLORIDE 10 MG: 10 TABLET ORAL at 16:52

## 2021-08-03 RX ADMIN — Medication 1 TABLET: at 09:00

## 2021-08-03 RX ADMIN — BRIMONIDINE TARTRATE 1 DROP: 2 SOLUTION OPHTHALMIC at 21:09

## 2021-08-03 RX ADMIN — CYANOCOBALAMIN TAB 1000 MCG 1000 MCG: 1000 TAB at 12:02

## 2021-08-03 RX ADMIN — FERROUS GLUCONATE 324 MG: 324 TABLET ORAL at 09:00

## 2021-08-03 RX ADMIN — AMLODIPINE BESYLATE 5 MG: 5 TABLET ORAL at 09:00

## 2021-08-03 RX ADMIN — FLUOXETINE HYDROCHLORIDE 20 MG: 20 CAPSULE ORAL at 09:00

## 2021-08-03 RX ADMIN — TRAMADOL HYDROCHLORIDE 50 MG: 50 TABLET, FILM COATED ORAL at 12:02

## 2021-08-03 RX ADMIN — ASPIRIN 325 MG: 325 TABLET, COATED ORAL at 09:00

## 2021-08-03 RX ADMIN — OMEPRAZOLE 20 MG: 20 CAPSULE, DELAYED RELEASE ORAL at 12:02

## 2021-08-03 ASSESSMENT — ACTIVITIES OF DAILY LIVING (ADL)
ADLS_ACUITY_SCORE: 19

## 2021-08-03 NOTE — PROGRESS NOTES
"BRIEF NUTRITION ASSESSMENT    REASON FOR ASSESSMENT:  Eri Puckett is a 89 year old female seen by Registered Dietitian for LOS    NUTRITION HISTORY:  - H/o DM2 and HTN. Presented after a fall.   - Pt seen in room this morning. She was pleasantly confused, and unable to answer questions appropriatly. She did express to me that she felt she was being well cared for.     - Per prior RD note dated 7/28:   \"Chart reviewed and discussed with daughter over the phone  Patient has a h/o advanced dementia and is alert to self only   Daughter states that pt's weight has been stable PTA and she has no concerns regarding recent changes in intake and appetite\"    CURRENT DIET AND INTAKE:  Diet: Regular diet             Patient has been consuming % of her meals, did miss a meal on 8/1 due to sleepiness. She otherwise receives adequate meals TID.     ANTHROPOMETRICS:  Height: 4' 11.449\"  Weight:  130 lbs 12.8 oz (59.3 kg)   Body mass index is 26.02 kg/m .   Weight Status: Overweight BMI 25-29.9  IBW:  44.9 kg   %IBW: 132%  Weight History:   Wt Readings from Last 10 Encounters:   08/02/21 59.3 kg (130 lb 12.8 oz)     59.9 kg (132 lb) 04/27/2021 2:48 PM CDT   59.4 kg (131 lb) 03/23/2021 3:47 PM CDT   62.1 kg (137 lb) 12/18/2020 9:51 AM CST     LABS:  Reviewed     MALNUTRITION:  Visual Nutrition Focused Physical Assessment (NFPA) completed. Do not suspect acute muscle/fat losses.  Patient does not meet two of the criteria necessary for diagnosing malnutrition.     NUTRITION INTERVENTION:  Nutrition Diagnosis:  No nutrition diagnosis at this time.    Implementation:  Nutrition Education:  Per Provider order if indicated    FOLLOW UP/MONITORING:   Will re-evaluate in 7 - 10 days, or sooner, if re-consulted.    Imani Mares RD, [facility], [address], MH   Pager: [phone]  Weekend Pager: [phone]    "

## 2021-08-03 NOTE — PROGRESS NOTES
Orthopedic Surgery  Eri Puckett  8/3/2021  Admit Date:  2021  POD: 7 Days Post-Op   Procedure(s):  OPEN REDUCTION INTERNAL FIXATION LEFT FEMUR FRACTURE.    Alert and oriented to person, place, and time.  Patient resting comfortably in bed.    Pain controlled.    Vital Sign Ranges  Temperature Temp  Av.6  F (37  C)  Min: 97.7  F (36.5  C)  Max: 99.2  F (37.3  C)   Blood pressure Systolic (24hrs), Av , Min:124 , Max:160        Diastolic (24hrs), Av, Min:53, Max:57      Pulse Pulse  Av.3  Min: 64  Max: 80   Respirations Resp  Av.7  Min: 16  Max: 18   Pulse oximetry SpO2  Av %  Min: 94 %  Max: 97 %       Dressing is clean, dry, and intact.   Minimal erythema of the surrounding skin.   Bilateral calves are soft, non-tender.  Left lower extremity is NVI.  Sensation intact bilateral lower extremities  Patient able to resist dorsi and plantar flexion bilaterally  +Dp pulse    Labs:  Recent Labs   Lab Test 21  0801 21  0155 19  0550   POTASSIUM 3.5 3.7 4.0     Recent Labs   Lab Test 21  0846 21  0801 21  0728   HGB 8.6* 8.6* 9.7*     Recent Labs   Lab Test 06/03/15  1030 13  0946 10/03/13  0945   INR 0.98 0.96 1.01     Recent Labs   Lab Test 21  0846 21  0801 21  0155   * 100* 154      1. Plan:              Continue ASA for DVT prophylaxis.                Mobilize with PT/OT               50% WB Left LE with walker.                Continue current pain regiment.              Dressings: Keep intact.  Change if >60% saturated   Ortho will recheck patient on POD#14 if still in hospital.  Call if any concerns prior to this.       2. Disposition              Awaiting TCU placement.       Consuelo Tan PA-C

## 2021-08-03 NOTE — PLAN OF CARE
Alert to self. Forgetful and tearful at times. VSS on RA. Pain controlled with scheduled Tylenol, PRN Utram and PRN Atarax. Heavy Ax2 gb/w to chair. Up to chair for meals. 50% weightbearing on LLE. Turn and Repo when in bed. Dressing with dried drainage. Bruising and dependent edema to LLE. Reddened Coccyx and using barrier cream. TCU placement pending.

## 2021-08-03 NOTE — PROGRESS NOTES
Care Management Follow Up    Length of Stay (days): 7    Expected Discharge Date: 08/02/2021     Concerns to be Addressed: discharge planning     Patient plan of care discussed at interdisciplinary rounds: Yes    Anticipated Discharge Disposition: Transitional Care     Anticipated Discharge Services:    Anticipated Discharge DME:      Patient/family educated on Medicare website which has current facility and service quality ratings:    Education Provided on the Discharge Plan:    Patient/Family in Agreement with the Plan: yes    Referrals Placed by CM/SW: Post Acute Facilities  Private pay costs discussed: Not applicable    Additional Information:  [facility] declined due to pt being A/O 1 and possibly being MA pending.  Left VM at [facility].   ADDENDUM:  Another daughter dropped off additional TCU choices given by family.  Additional TCU referrals sent.   ADDENDUM: [facility] clinically accepts, and anticipates bed on 8/5 or 8/6/21.  SW called and requested that pt receive a TCU bed when one is available.       Jeanette Jewell, [facility] Hospital[facility]

## 2021-08-03 NOTE — PROGRESS NOTES
[facility]    Medicine Progress Note - Hospitalist Service       Date of Admission:  7/27/2021    Assessment & Plan             Eri Puckett is a markedly pleasant 89 year old woman with past medical history that is most notable for advanced chronic dementia, as well as Type 2 Diabetes mellitus and Hodgkins Lymphoma, among others; who presents with mechanical fall causing acute left intertrochanteric hip fracture.     Mechanical fall causing acute left intertrochanteric hip fracture  Status post open reduction and internal fixation 7/27, appreciate orthopedics input   - Diet, DVT prophylaxis (on ASA) and pain control as per Ortho. No new recommendations at this time     Acute blood loss anemia  Hemoglobin dropped from 13-9.5-8.6, hemoglobin has been stable since 7/29, will obtain CBC in a.m.  - Continue Ferrous gluconate   - Hgb as outpt at f/u with PCP     Rib fracture  Incidentally seen on CXR today, of unclear time frame; she has no current chest pain and this finding could have been due to a previous fall.   - Aggressive IS encouraged      Metabolic encephalopathy  Delirium- improved  Chronic dementia: onset Alzheimers Disease.  - PRN Seroquel   - Continue PTA Aricept     Moderate aortic valve stenosis  She was hospitalized here in 2017 after a fall leading to right wrist fracture, and TTE was done and showed preserved LVEF  - Consider follow up TTE after urgent surgery or as an outpatient     H/o Hodgkins Lymphoma  Reportedly treated for recurrence with Rituximab until she decided to stop treatment as it was no longer within her care goals.      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 Diet controlled as an outpatient.  - Monitor as needed           Diet: Advance Diet as Tolerated: Regular Diet Adult  Advance Diet as Tolerated     DVT Prophylaxis: Pneumatic Compression Devices  Perrin Catheter: Not present  Central Lines: None  Code Status: No CPR- Do NOT Intubate      Disposition Plan   Expected discharge: 08/03/2021 recommended to transitional care unit once safe disposition plan/ TCU bed available.     The patient's care was discussed with the Bedside Nurse, Care Coordinator/ and Patient.    Bryon Cline, DO  Hospitalist Service  [facility]  Securely message with the Vocera Web Console (learn more here)  Text page via [employer] Paging/Directory      Clinically Significant Risk Factors Present on Admission                ______________________________________________________________________    Interval History   Patient seen and examined.  No acute events over night.  Patient more anxious today and worried about deconditioning.  Pain is controlled.  No difficulty breathing.  No fevers noted.  Tolerating diet.     Data reviewed today: I reviewed all medications, new labs and imaging results over the last 24 hours. I personally reviewed no images or EKG's today.    Physical Exam   Vital Signs: Temp: 98.5  F (36.9  C) Temp src: Oral BP: 139/56 Pulse: 64   Resp: 14 SpO2: 95 % O2 Device: None (Room air)    Weight: 130 lbs 12.8 oz  General Appearance: Resting comfortably.  NAD   Respiratory: Clear to auscultation.  No respiratory distress   Cardiovascular: RRR.  No murmurs  GI: Soft.  Non-distended  Skin: No obvious rashes or cyanosis to exposed skin  Other:  Alert.  No edema      Data   Recent Labs   Lab 08/02/21  1302 08/02/21  0938 08/02/21  0416 07/30/21  0846 07/29/21  0801 07/28/21  1628 07/28/21  0728   WBC  --   --   --  5.3 6.4  --   --    HGB  --   --   --  8.6* 8.6*  --  9.7*   MCV  --   --   --  88 90  --   --    PLT  --   --   --  106* 100*  --   --    NA  --   --   --   --  141  --   --    POTASSIUM  --   --   --   --  3.5  --   --    CHLORIDE  --   --   --   --  107  --   --    CO2  --   --    --   --  30  --   --    BUN  --   --   --   --  18  --   --    CR  --   --   --   --  0.87 1.06*  --    ANIONGAP  --   --   --   --  4  --   --    GARRETT  --   --   --   --  7.8*  --   --    * 109* 103*  --  106*  --   --      No results found for this or any previous visit (from the past 24 hour(s)).

## 2021-08-03 NOTE — PROGRESS NOTES
"SPIRITUAL HEALTH SERVICES Progress Note  [facility]    Referral Source: Length of Stay    PT (\"Eri\") indicates that she believes that she is doing \"pretty well\" at her age but is not quite certain about what \"you are supposed to do\" when  you are 89. Eri talked about the many things that she enjoys particularly good food, indicating that they have an excellent cook where she lives. She states that she feels well cared for both at home and in hospital and hopes to return home in the near future.    Eri states that she believes God is with her in her journey and will let her know when \"the time comes\" but does not feel there is any hurry.    Provided emotional and spiritual support through active listening and prayer.    Daughter (Blanca) states that her mother enjoys visits and requests additional visits for light conversation and prayer. Follow-up with PT while she remains in hospital.      Jesse Melissa  Chaplain Resident   "

## 2021-08-03 NOTE — PROGRESS NOTES
Patient Alert to self only. VSS on RA. Tylenol for pain. Regular diet.T&R Q2hrs. Moderate dried drainage. Incontinent B&B. Will continue monitoring.

## 2021-08-04 PROCEDURE — 250N000013 HC RX MED GY IP 250 OP 250 PS 637: Performed by: PHYSICIAN ASSISTANT

## 2021-08-04 PROCEDURE — 120N000001 HC R&B MED SURG/OB

## 2021-08-04 PROCEDURE — 250N000013 HC RX MED GY IP 250 OP 250 PS 637: Performed by: INTERNAL MEDICINE

## 2021-08-04 PROCEDURE — 99232 SBSQ HOSP IP/OBS MODERATE 35: CPT | Performed by: INTERNAL MEDICINE

## 2021-08-04 RX ADMIN — FERROUS GLUCONATE 324 MG: 324 TABLET ORAL at 08:25

## 2021-08-04 RX ADMIN — ALLOPURINOL 100 MG: 100 TABLET ORAL at 08:25

## 2021-08-04 RX ADMIN — BRIMONIDINE TARTRATE 1 DROP: 2 SOLUTION OPHTHALMIC at 21:46

## 2021-08-04 RX ADMIN — ASPIRIN 325 MG: 325 TABLET, COATED ORAL at 08:25

## 2021-08-04 RX ADMIN — HYDROXYZINE HYDROCHLORIDE 10 MG: 10 TABLET ORAL at 22:48

## 2021-08-04 RX ADMIN — CYANOCOBALAMIN TAB 1000 MCG 1000 MCG: 1000 TAB at 11:38

## 2021-08-04 RX ADMIN — AMLODIPINE BESYLATE 5 MG: 5 TABLET ORAL at 08:25

## 2021-08-04 RX ADMIN — ROSUVASTATIN CALCIUM 10 MG: 10 TABLET, FILM COATED ORAL at 21:38

## 2021-08-04 RX ADMIN — ACETAMINOPHEN 650 MG: 325 TABLET, FILM COATED ORAL at 21:51

## 2021-08-04 RX ADMIN — FLUOXETINE HYDROCHLORIDE 20 MG: 20 CAPSULE ORAL at 08:25

## 2021-08-04 RX ADMIN — SENNOSIDES AND DOCUSATE SODIUM 1 TABLET: 8.6; 5 TABLET ORAL at 08:25

## 2021-08-04 RX ADMIN — OMEPRAZOLE 20 MG: 20 CAPSULE, DELAYED RELEASE ORAL at 11:38

## 2021-08-04 RX ADMIN — CARBOXYMETHYLCELLULOSE SODIUM 2 DROP: 10 GEL OPHTHALMIC at 21:45

## 2021-08-04 RX ADMIN — CARBOXYMETHYLCELLULOSE SODIUM 2 DROP: 10 GEL OPHTHALMIC at 21:38

## 2021-08-04 RX ADMIN — CALCIUM CARBONATE 600 MG (1,500 MG)-VITAMIN D3 400 UNIT TABLET 1 TABLET: at 08:25

## 2021-08-04 RX ADMIN — DONEPEZIL HYDROCHLORIDE 20 MG: 10 TABLET ORAL at 21:38

## 2021-08-04 RX ADMIN — QUETIAPINE FUMARATE 25 MG: 25 TABLET ORAL at 14:47

## 2021-08-04 RX ADMIN — BRIMONIDINE TARTRATE 1 DROP: 2 SOLUTION OPHTHALMIC at 08:25

## 2021-08-04 RX ADMIN — ACETAMINOPHEN 650 MG: 325 TABLET, FILM COATED ORAL at 07:03

## 2021-08-04 RX ADMIN — Medication 1 TABLET: at 08:25

## 2021-08-04 ASSESSMENT — ACTIVITIES OF DAILY LIVING (ADL)
ADLS_ACUITY_SCORE: 19

## 2021-08-04 NOTE — PROGRESS NOTES
[facility]    Medicine Progress Note - Hospitalist Service       Date of Admission:  7/27/2021    Assessment & Plan                Eri Puckett is a markedly pleasant 89 year old woman with past medical history that is most notable for advanced chronic dementia, as well as Type 2 Diabetes mellitus and Hodgkins Lymphoma, among others; who presents with mechanical fall causing acute left intertrochanteric hip fracture.     Mechanical fall causing acute left intertrochanteric hip fracture  Status post open reduction and internal fixation 7/27, appreciate orthopedics input   - Diet, DVT prophylaxis (on ASA) and pain control as per Ortho. No new recommendations at this time.  Ortho following intermittently      Acute blood loss anemia  Hemoglobin dropped from 13-9.5-8.6, hemoglobin has been stable since 7/29, will obtain CBC in a.m.  - Continue Ferrous gluconate   - Hgb as outpt at f/u with PCP     Rib fracture  Incidentally seen on CXR, of unclear time frame; she has no current chest pain and this finding could have been due to a previous fall.   - Aggressive IS encouraged      Metabolic encephalopathy  Delirium- improved  Chronic dementia: onset Alzheimers Disease.  - PRN Seroquel   - Continue PTA Aricept     Moderate aortic valve stenosis  She was hospitalized here in 2017 after a fall leading to right wrist fracture, and TTE was done and showed preserved LVEF  - Consider follow up TTE after urgent surgery or as an outpatient     H/o Hodgkins Lymphoma  Reportedly treated for recurrence with Rituximab until she decided to stop treatment as it was no longer within her care goals.      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 Diet controlled as an outpatient.  - Monitor as needed             Diet: Advance Diet as Tolerated: Regular Diet  Adult  Advance Diet as Tolerated    DVT Prophylaxis: Pneumatic Compression Devices  Perrin Catheter: Not present  Central Lines: None  Code Status: No CPR- Do NOT Intubate      Disposition Plan   Expected discharge: 1-2 days to TCU once TCU bed available    The patient's care was discussed with the Bedside Nurse, Care Coordinator/ and Patient.    Bryon Cline DO  Hospitalist Service  [facility]  Securely message with the Vocera Web Console (learn more here)  Text page via [employer] Paging/Directory      Clinically Significant Risk Factors Present on Admission                ______________________________________________________________________    Interval History   Patient seen and examined.  No acute events over night.  Pain is well controlled.  No chest pain or SOB.  No fevers.  Tolerating diet.    Data reviewed today: I reviewed all medications, new labs and imaging results over the last 24 hours. I personally reviewed no images or EKG's today.    Physical Exam   Vital Signs: Temp: 98.7  F (37.1  C) Temp src: Oral BP: (!) 152/61 Pulse: 56   Resp: 16 SpO2: 93 % O2 Device: None (Room air)    Weight: 130 lbs 12.8 oz  General Appearance: Sleeping.  Arousable to voice.  NAD   Respiratory: Clear to auscultation.  No respiratory distress  Cardiovascular: RRR.  No obvious murmurs  GI: Soft.  Non-distended  Skin: No obvious rashes or cyanosis to exposed skin  Other: No edema.  No calf tenderness     Data   Recent Labs   Lab 08/02/21  1302 08/02/21  0938 08/02/21  0416 07/30/21  0846 07/29/21  0801 07/28/21  1628   WBC  --   --   --  5.3 6.4  --    HGB  --   --   --  8.6* 8.6*  --    MCV  --   --   --  88 90  --    PLT  --   --   --  106* 100*  --    NA  --   --   --   --  141  --    POTASSIUM  --   --   --   --  3.5  --    CHLORIDE  --   --   --   --  107  --    CO2  --   --   --   --  30  --    BUN  --   --   --   --  18  --    CR  --   --   --   --  0.87 1.06*   ANIONGAP  --   --    --   --  4  --    GARRETT  --   --   --   --  7.8*  --    * 109* 103*  --  106*  --      No results found for this or any previous visit (from the past 24 hour(s)).

## 2021-08-04 NOTE — PROGRESS NOTES
Care Management Follow Up    Length of Stay (days): 8    Expected Discharge Date: 08/05/2021     Concerns to be Addressed: discharge planning     Patient plan of care discussed at interdisciplinary rounds: Yes    Anticipated Discharge Disposition: Transitional Care     Anticipated Discharge Services:    Anticipated Discharge DME:      Patient/family educated on Medicare website which has current facility and service quality ratings:    Education Provided on the Discharge Plan:    Patient/Family in Agreement with the Plan: yes    Referrals Placed by CM/SW: Post Acute Facilities  Private pay costs discussed: Not applicable    Additional Information:  Call placed to [facility] regarding bed status as notes indicate that a bed may be available Thursday/Friday. Message left.    Update: signature pages received from Alexey with [employer]. Writer printed them off and brought to patient and daughter Blanca. Patient sleeping so daughter will have her sign when she wakes up and ask bedside nurse to place on front of chart. Writer will send to [facility] in the morning.       FLOR Remy

## 2021-08-04 NOTE — PLAN OF CARE
Pt alert to self. VSS on RA. Turned and repo Q 2hrs. Purewick in place. Pain managed withTylenol. Dried drainage on dressing. Will continue to monitor

## 2021-08-04 NOTE — PLAN OF CARE
Patient vital signs are at baseline: Yes     Patient able to ambulate as they were prior to admission or with assist devices provided by therapies during their stay:  No,  Reason:  Pt requiring heavy assist of 2, did not get up this shift, reports feeling down and depressed today.    Patient MUST void prior to discharge:  Yes  Patient able to tolerate oral intake:  Yes  Pain has adequate pain control using Oral analgesics:  Yes     Pt yelling out and anxious this afternoon, prn seroquel given, pt sleeping since.

## 2021-08-05 ENCOUNTER — APPOINTMENT (OUTPATIENT)
Dept: PHYSICAL THERAPY | Facility: CLINIC | Age: 86
DRG: 480 | End: 2021-08-05
Payer: COMMERCIAL

## 2021-08-05 PROCEDURE — 250N000013 HC RX MED GY IP 250 OP 250 PS 637: Performed by: INTERNAL MEDICINE

## 2021-08-05 PROCEDURE — 120N000001 HC R&B MED SURG/OB

## 2021-08-05 PROCEDURE — 97530 THERAPEUTIC ACTIVITIES: CPT | Mod: GP

## 2021-08-05 PROCEDURE — 250N000013 HC RX MED GY IP 250 OP 250 PS 637: Performed by: PHYSICIAN ASSISTANT

## 2021-08-05 PROCEDURE — 250N000013 HC RX MED GY IP 250 OP 250 PS 637: Performed by: HOSPITALIST

## 2021-08-05 PROCEDURE — 99232 SBSQ HOSP IP/OBS MODERATE 35: CPT | Performed by: INTERNAL MEDICINE

## 2021-08-05 PROCEDURE — 97110 THERAPEUTIC EXERCISES: CPT | Mod: GP

## 2021-08-05 RX ORDER — FERROUS GLUCONATE 324(38)MG
324 TABLET ORAL
DISCHARGE
Start: 2021-08-05

## 2021-08-05 RX ADMIN — ASPIRIN 325 MG: 325 TABLET, COATED ORAL at 08:42

## 2021-08-05 RX ADMIN — Medication 50 MCG: at 17:29

## 2021-08-05 RX ADMIN — OMEPRAZOLE 20 MG: 20 CAPSULE, DELAYED RELEASE ORAL at 12:00

## 2021-08-05 RX ADMIN — CALCIUM CARBONATE 600 MG (1,500 MG)-VITAMIN D3 400 UNIT TABLET 1 TABLET: at 08:42

## 2021-08-05 RX ADMIN — AMLODIPINE BESYLATE 5 MG: 5 TABLET ORAL at 08:42

## 2021-08-05 RX ADMIN — ACETAMINOPHEN 650 MG: 325 TABLET, FILM COATED ORAL at 17:29

## 2021-08-05 RX ADMIN — CALCIUM CARBONATE 600 MG (1,500 MG)-VITAMIN D3 400 UNIT TABLET 1 TABLET: at 17:29

## 2021-08-05 RX ADMIN — TRAMADOL HYDROCHLORIDE 50 MG: 50 TABLET, FILM COATED ORAL at 18:19

## 2021-08-05 RX ADMIN — FLUOXETINE HYDROCHLORIDE 20 MG: 20 CAPSULE ORAL at 08:42

## 2021-08-05 RX ADMIN — BRIMONIDINE TARTRATE 1 DROP: 2 SOLUTION OPHTHALMIC at 08:41

## 2021-08-05 RX ADMIN — FERROUS GLUCONATE 324 MG: 324 TABLET ORAL at 08:42

## 2021-08-05 RX ADMIN — Medication 1 TABLET: at 08:42

## 2021-08-05 RX ADMIN — CYANOCOBALAMIN TAB 1000 MCG 1000 MCG: 1000 TAB at 12:00

## 2021-08-05 RX ADMIN — ALLOPURINOL 100 MG: 100 TABLET ORAL at 08:42

## 2021-08-05 RX ADMIN — MELATONIN 5 MG TABLET 5 MG: at 00:40

## 2021-08-05 RX ADMIN — ACETAMINOPHEN 650 MG: 325 TABLET, FILM COATED ORAL at 08:42

## 2021-08-05 RX ADMIN — TRAMADOL HYDROCHLORIDE 50 MG: 50 TABLET, FILM COATED ORAL at 06:34

## 2021-08-05 ASSESSMENT — ACTIVITIES OF DAILY LIVING (ADL)
ADLS_ACUITY_SCORE: 19
ADLS_ACUITY_SCORE: 17
ADLS_ACUITY_SCORE: 17
ADLS_ACUITY_SCORE: 19

## 2021-08-05 NOTE — PLAN OF CARE
Patient vital signs are at baseline: Yes, VSS on RA    Patient able to ambulate as they were prior to admission or with assist devices provided by therapies during their stay:  No,  Reason:  Pt mobility moderately impaired since fall, and now, following ORIF of left femur - POD#9 today.    Patient MUST void prior to discharge:  Yes, incontinent of B&B this shift, PW in use -- pericare done, and new PW placed    Patient able to tolerate oral intake:  Yes, only with encouragement - writer fed pt serving of pudding (pt ate 100%), and pt then ate half a sandwich around 2100 hrs, this after not eating supper.    Pain has adequate pain control using Oral analgesics:  Yes, pt can't self report pain. However, PAINAD tool in use. PRN Tylenol and Atarax given. Ultram also available.     Pt [place] - one hearing aid on bedside table.Turn & reposition, push fluids. Bruising noted on LLE (hip). Pt agitated. PRN pain pill, Melatonin, lavender oils given, and calming music played -- pt slept. CMS and neuros intact. Reg diet, A2 w/ GB&W or lift device for mobility.

## 2021-08-05 NOTE — PROGRESS NOTES
[facility]    Medicine Progress Note - Hospitalist Service       Date of Admission:  7/27/2021    Assessment & Plan              Eri Puckett is a markedly pleasant 89 year old woman with past medical history that is most notable for advanced chronic dementia, as well as Type 2 Diabetes mellitus and Hodgkins Lymphoma, among others; who presents with mechanical fall causing acute left intertrochanteric hip fracture.     Mechanical fall causing acute left intertrochanteric hip fracture  Status post open reduction and internal fixation 7/27, appreciate orthopedics input   - Diet, DVT prophylaxis (on ASA) and pain control as per Ortho. No new recommendations at this time.  Ortho following intermittently      Acute blood loss anemia  Hemoglobin dropped from 13-9.5-8.6, hemoglobin has been stable since 7/29, will obtain CBC in a.m.  - Continue Ferrous gluconate   - Hgb as outpt at f/u with PCP     Rib fracture  Incidentally seen on CXR, of unclear time frame; she has no current chest pain and this finding could have been due to a previous fall.   - Aggressive IS encouraged      Metabolic encephalopathy  Delirium- improved  Chronic dementia: onset Alzheimers Disease.  - PRN Seroquel   - Continue PTA Aricept     Moderate aortic valve stenosis  She was hospitalized here in 2017 after a fall leading to right wrist fracture, and TTE was done and showed preserved LVEF  - Consider follow up TTE after urgent surgery or as an outpatient     H/o Hodgkins Lymphoma  Reportedly treated for recurrence with Rituximab until she decided to stop treatment as it was no longer within her care goals.      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 Diet controlled as an outpatient.  - Monitor as needed               Diet: Advance Diet as Tolerated: Regular Diet  Adult  Advance Diet as Tolerated  Advance Diet as Tolerated    DVT Prophylaxis: Pneumatic Compression Devices  Perrin Catheter: Not present  Central Lines: None  Code Status: No CPR- Do NOT Intubate      Disposition Plan   Expected discharge: 08/05/2021 recommended to transitional care unit once safe disposition plan/ TCU bed available.     The patient's care was discussed with the Bedside Nurse, Care Coordinator/ and Patient.    Bryon Cline DO  Hospitalist Service  [facility]  Securely message with the Vocera Web Console (learn more here)  Text page via [employer] Paging/Directory      Clinically Significant Risk Factors Present on Admission                ______________________________________________________________________    Interval History   Patient seen and examined.  No acute events over night.  Pain is controlled.  No difficulty breathing.  No fevers or chills.  Tolerating diet.     Data reviewed today: I reviewed all medications, new labs and imaging results over the last 24 hours. I personally reviewed no images or EKG's today.    Physical Exam   Vital Signs: Temp: 98.2  F (36.8  C) Temp src: Oral BP: 137/56 Pulse: 58   Resp: 16 SpO2: 95 % O2 Device: None (Room air)    Weight: 130 lbs 12.8 oz  General Appearance: Resting comfortably.  NAD  Respiratory: Clear to auscultation.  No respiratory distress  Cardiovascular: RRR.  No obvious murmurs  GI: Soft.  Non-distended  Skin: No obvious rashes or cyanosis  Other: Alert.  No edema     Data   Recent Labs   Lab 08/02/21  1302 08/02/21  0938 08/02/21  0416 07/30/21  0846   WBC  --   --   --  5.3   HGB  --   --   --  8.6*   MCV  --   --   --  88   PLT  --   --   --  106*   * 109* 103*  --      No results found for this or any previous visit (from the past 24 hour(s)).

## 2021-08-05 NOTE — PLAN OF CARE
Pt up in chair x2 this shift. Pain with movement, comfortable when at rest. Good appetite, incontinent BM and voids this shift.

## 2021-08-05 NOTE — PROGRESS NOTES
Care Management Follow Up    Length of Stay (days): 9    Expected Discharge Date: 08/05/2021     Concerns to be Addressed: discharge planning     Patient plan of care discussed at interdisciplinary rounds: Yes    Anticipated Discharge Disposition: Transitional Care     Anticipated Discharge Services:    Anticipated Discharge DME:      Patient/family educated on Medicare website which has current facility and service quality ratings:    Education Provided on the Discharge Plan:    Patient/Family in Agreement with the Plan: yes    Referrals Placed by CM/SW: Post Acute Facilities  Private pay costs discussed: Not applicable    Additional Information:  [facility] and facility likely to have a bed available Friday 8/6/21. Call returned and message left confirming bed and requesting a call back to discuss time.     Signature pages scanned and sent back to [place] with financial to send back to [facility]. Completed packet in patient chart.    Second call placed to [facility] regarding bed status as additional phone message received indicating there will be a bed available. Message left.    Update: Writer spoke to [facility] who would like patient to arrive at 1330 or after 1500. Call placed to [facility] transport and stretcher ride scheduled at 1600. Writer updated daughter who asked if patient could go by wheelchair. Writer reviewed notes and patient was up in a chart today after therapy. Writer called and changed stretcher to wheelchair as patient's daughter does not believe that patient will attempt to get out of chair.       FLOR Remy

## 2021-08-05 NOTE — DISCHARGE SUMMARY
[facility]  Hospitalist Discharge Summary      Date of Admission:  7/27/2021  Date of Discharge:  8/6/2021  Discharging Provider: Bryon Cline DO      Discharge Diagnoses   Mechanical fall causing acute left intertrochanteric hip fracture  Acute blood loss anemia, post-op  Rib fracture   Acute metabolic encephalopathy  Chronic dementia, suspect Alzheimers Disease.   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 Barrett/Baljinder Carrera PAC , at (location with clinic name   or city) [facility], within 14 days  to evaluate after   surgery. No follow up labs or test are needed prior to visit. At this   visit x-rays will be taken, sutures/staples removed, and questions to be   answered.  Bring any needed forms with to appointment.  Call for appointment (Joann Hale- Patient Coordinator): [phone]  After hours: [phone]         Follow Up and recommended labs and tests      Follow up with Nursing home physician.  No follow up labs or test are   needed.  Follow up with PCP 1-2 weeks after TCU discharge  Follow up with orthopedic surgery           Unresulted Labs Ordered in the Past 30 Days of this Admission     No orders found from 6/27/2021 to 7/28/2021.        Discharge Disposition   Discharged to short-term care facility  Condition at discharge: Stable    Hospital Course   Eri Puckett is a markedly pleasant 89 year old woman with past medical history that is most notable for advanced chronic dementia, as well as Type 2 Diabetes mellitus and Hodgkins Lymphoma, among others; who presents with mechanical fall causing acute left intertrochanteric hip fracture.     Mechanical fall causing acute left intertrochanteric hip fracture  Status post  open reduction and internal fixation 7/27, appreciate orthopedics input   - Diet, DVT prophylaxis (on ASA) and pain control as per Ortho. No new recommendations at this time.  Ortho following intermittently      Acute blood loss anemia  Hemoglobin dropped from 13-9.5-8.6, hemoglobin has been stable since 7/29, will obtain CBC in a.m.  - Continue Ferrous gluconate   - Hgb as outpt at f/u with PCP     Rib fracture  Incidentally seen on CXR, of unclear time frame; she has no current chest pain and this finding could have been due to a previous fall.   - Aggressive IS encouraged      Metabolic encephalopathy  Delirium- improved  Chronic dementia: onset Alzheimers Disease.  - PRN Seroquel   - Continue PTA Aricept     Moderate aortic valve stenosis  She was hospitalized here in 2017 after a fall leading to right wrist fracture, and TTE was done and showed preserved LVEF  - Consider follow up TTE as an outpatient     H/o Hodgkins Lymphoma  Reportedly treated for recurrence with Rituximab until she decided to stop treatment as it was no longer within her care goals.      Hypertension  - PTA Amlodipine 5 mg /day     Dyslipidemia   - PTA Statin      Gout  - Continue allopurinol     Type 2 Diabetes mellitus causing peripheral neuropathy:   Most recent A1c 6.0 in 3/2021. Diet controlled as an outpatient.  - Monitor as needed     Consultations This Hospital Stay   ORTHOPEDIC SURGERY IP CONSULT  ORTHOPEDIC SURGERY IP CONSULT  PHYSICAL THERAPY ADULT IP CONSULT  OCCUPATIONAL THERAPY ADULT IP CONSULT  CARE MANAGEMENT / SOCIAL WORK IP CONSULT  PHYSICAL THERAPY ADULT IP CONSULT  OCCUPATIONAL THERAPY ADULT IP CONSULT    Code Status   No CPR- Do NOT Intubate    Time Spent on this Encounter   IBryon DO, personally saw the patient today and spent greater than 30 minutes discharging this patient.       Bryon Cline DO  [place] [address] [facility]  [address] [place] [address]  Phone:  [phone]  ______________________________________________________________________    Physical Exam   Vital Signs: Temp: 97.6  F (36.4  C) Temp src: Oral BP: (!) 145/62 Pulse: 64   Resp: 16 SpO2: 95 % O2 Device: None (Room air)    Weight: 130 lbs 12.8 oz  General Appearance: Resting comfortably in bed. NAD   Respiratory: Clear to auscultation.  No respiratory distress  Cardiovascular: RRR.  No obvious murmurs  GI: Soft.  Non-tender  Skin: No obvious rashes or cyanosis  Other: No edema.  On calf tenderness         Primary Care Physician   Kylee Liang    Discharge Orders      General info for SNF    Length of Stay Estimate: Short Term Care: Estimated # of Days <30  Condition at Discharge: Improving  Level of care:skilled   Rehabilitation Potential: Good  Admission H&P remains valid and up-to-date: Yes  Recent Chemotherapy: N/A  Use Nursing Home Standing Orders: Yes     Mantoux instructions    Give two-step Mantoux (PPD) Per Facility Policy Yes     Follow Up and recommended labs and tests    Follow up with Dr. Barrett/Baljinder Carrera PAC , at (location with clinic name or city[facility], within 14 days  to evaluate after surgery. No follow up labs or test are needed prior to visit. At this visit x-rays will be taken, sutures/staples removed, and questions to be answered.  Bring any needed forms with to appointment.  Call for appointment (Joann Hale- Patient Coordinator): [phone]  After hours: [phone]     Reason for your hospital stay    Left intertrochanteric femur fracture DHS ORIF     Weight bearing status    50% WB left LE     Activity - Up with assistive device     Wound care    Site:   Left hip  Instructions:  Leave dressing intact for 2 weeks, call if becomes overly saturated (>50%); do not soak or submerge, ok to shower, reinforce if needed     General info for SNF    Length of Stay Estimate: Short Term Care: Estimated # of Days <30  Condition at Discharge: Stable  Level of  care:skilled   Rehabilitation Potential: Fair  Admission H&P remains valid and up-to-date: Yes  Recent Chemotherapy: N/A  Use Nursing Home Standing Orders: Yes     Mantoux instructions    Give two-step Mantoux (PPD) Per Facility Policy Yes     Follow Up and recommended labs and tests    Follow up with Nursing home physician.  No follow up labs or test are needed.  Follow up with PCP 1-2 weeks after TCU discharge  Follow up with orthopedic surgery     Reason for your hospital stay    Hip fracture     Physical Therapy Adult Consult    Evaluate and treat as clinically indicated.    Reason:  Left intertrochanteric femur fracture DHS ORIF     Occupational Therapy Adult Consult    Evaluate and treat as clinically indicated.    Reason:  Left intertrochanteric femur fracture DHS ORIF     Fall precautions     Advance Diet as Tolerated    Follow this diet upon discharge: Orders Placed This Encounter      Advance Diet as Tolerated: Regular Diet Adult     Advance Diet as Tolerated    Follow this diet upon discharge: Orders Placed This Encounter      Advance Diet as Tolerated: Regular Diet Adult      Advance Diet as Tolerated       Significant Results and Procedures   Most Recent 3 CBC's:  Recent Labs   Lab Test 07/30/21  0846 07/29/21  0801 07/28/21  0728 07/27/21  0155   WBC 5.3 6.4  --  11.4*   HGB 8.6* 8.6* 9.7* 13.1   MCV 88 90  --  88   * 100*  --  154     Most Recent 3 BMP's:  Recent Labs   Lab Test 08/02/21  1302 08/02/21  0938 08/02/21  0416 07/29/21  0801 07/28/21  1628 07/27/21  0155 04/02/19  0550   NA  --   --   --  141  --  141 142   POTASSIUM  --   --   --  3.5  --  3.7 4.0   CHLORIDE  --   --   --  107  --  108 107   CO2  --   --   --  30  --  30 30   BUN  --   --   --  18  --  15 10   CR  --   --   --  0.87 1.06* 0.90 0.92   ANIONGAP  --   --   --  4  --  3 5   GARRETT  --   --   --  7.8*  --  8.4* 8.5   * 109* 103* 106*  --  207* 153*   ,   Results for orders placed or performed during the hospital  encounter of 07/27/21   XR Pelvis and Hip Left 1 View    Narrative    EXAM: XR PELVIS AND HIP LEFT 1 VIEW  LOCATION: [facility]  DATE/TIME: 7/27/2021 2:35 AM    INDICATION: fall, pain/deformity of L hip  COMPARISON: None.      Impression    IMPRESSION: Comminuted and angulated left intertrochanteric hip fracture.   XR Chest 1 View    Narrative    EXAM: XR CHEST 1 VIEW  LOCATION: [facility]  DATE/TIME: 7/27/2021 2:45 AM    INDICATION: Fall. Hip fracture.  COMPARISON: 6/3/2015.    FINDINGS: The heart size is normal. The thoracic aorta is calcified. The lungs are clear. Age-indeterminate right posterolateral eighth rib fracture. No pneumothorax.      Impression    IMPRESSION: Age-indeterminate right eighth rib fracture. No pneumothorax.   CT Head w/o Contrast    Narrative    EXAM: CT HEAD W/O CONTRAST  LOCATION: [facility]  DATE/TIME: 7/27/2021 2:43 AM    INDICATION: Head injury  COMPARISON: 04/02/2019  TECHNIQUE: Routine CT Head without IV contrast. Multiplanar reformats. Dose reduction techniques were used.    FINDINGS:  INTRACRANIAL CONTENTS: No intracranial hemorrhage, extraaxial collection, or mass effect.  No CT evidence of acute infarct. Moderate presumed chronic small vessel ischemic changes. Moderate generalized volume loss. No hydrocephalus.     VISUALIZED ORBITS/SINUSES/MASTOIDS: No intraorbital abnormality. No paranasal sinus mucosal disease. No middle ear or mastoid effusion.    BONES/SOFT TISSUES: No acute abnormality.      Impression    IMPRESSION:  1.  No acute intracranial process.   XR Surgery ADDY Fluoro L/T 5 Min w Stills    Narrative    SURGERY C-ARM FLUORO LESS THAN 5 MIN W STILLS July 27, 2021 2:15 PM     HISTORY: Left open reduction internal fixation femur fracture.    COMPARISON: X-ray from earlier today.    NUMBER OF IMAGES ACQUIRED: 12.    VIEWS: Two.    FLUOROSCOPY TIME: 1.7 minutes.      Impression    IMPRESSION:  Images demonstrate the procedure of placement of  cephalomedullary nail for fixation of intertrochanteric fracture.    HOLLY VICENTE MD         SYSTEM ID:  [id number]       Discharge Medications   Current Discharge Medication List      START taking these medications    Details   acetaminophen (TYLENOL) 325 MG tablet Take 3 tablets (975 mg) by mouth every 8 hours    Associated Diagnoses: Hip fracture, left, closed, initial encounter (H)      aspirin (ASA) 325 MG EC tablet Take 1 tablet (325 mg) by mouth daily  Qty: 30 tablet, Refills: 0    Associated Diagnoses: Hip fracture, left, closed, initial encounter (H)      ferrous gluconate (FERGON) 324 (38 Fe) MG tablet Take 1 tablet (324 mg) by mouth daily (with breakfast)    Associated Diagnoses: Anemia, unspecified type      polyethylene glycol (MIRALAX) 17 g packet Take 17 g by mouth daily as needed for constipation    Associated Diagnoses: Hip fracture, left, closed, initial encounter (H)      traMADol (ULTRAM) 50 MG tablet Take 1 tablet (50 mg) by mouth every 6 hours as needed for moderate pain or severe pain  Qty: 30 tablet, Refills: 0    Associated Diagnoses: Hip fracture, left, closed, initial encounter (H)         CONTINUE these medications which have NOT CHANGED    Details   allopurinol (ZYLOPRIM) 100 MG tablet Take 100 mg by mouth every morning      amLODIPine (NORVASC) 5 MG tablet Take 5 mg by mouth daily (with lunch)       brimonidine (ALPHAGAN) 0.2 % ophthalmic solution Place 1 drop into both eyes 2 times daily      calcium-vitamin D (CALTRATE) 600-400 MG-UNIT per tablet Take 1 tablet by mouth 2 times daily (with meals)       Cyanocobalamin (VITAMIN B 12 PO) Take 1,000 mcg by mouth daily (with lunch)       diphenhydrAMINE-acetaminophen (TYLENOL PM)  MG tablet Take 1 tablet by mouth nightly as needed for sleep      donepezil (ARICEPT) 10 MG tablet Take 20 mg by mouth At Bedtime      FLUoxetine (PROZAC) 10 MG tablet Take 20 mg by mouth every morning       loperamide (IMODIUM) 2 MG capsule Take 2 mg by mouth 4 times daily as needed for diarrhea      Multiple Vitamins-Minerals (CENTRUM SILVER) per tablet Take 1 tablet by mouth every morning       omeprazole (PRILOSEC) 20 MG DR capsule Take 20 mg by mouth daily (with lunch)       Polyethylene Glycol 400 (BLINK TEARS OP) Apply 1 drop to eye as needed (to both eye as needed)      rosuvastatin (CRESTOR) 20 MG tablet Take 10 mg by mouth At Bedtime       vitamin D3 (CHOLECALCIFEROL) 50 mcg (2000 units) tablet Take 50 mcg by mouth daily (with dinner)      Carboxymethylcellul-Glycerin (REFRESH OPTIVE) 1-0.9 % GEL Place into both eyes At Bedtime            Allergies   Allergies   Allergen Reactions     Cephalexin Rash

## 2021-08-06 VITALS
WEIGHT: 130.8 LBS | HEART RATE: 64 BPM | DIASTOLIC BLOOD PRESSURE: 62 MMHG | RESPIRATION RATE: 16 BRPM | SYSTOLIC BLOOD PRESSURE: 145 MMHG | OXYGEN SATURATION: 95 % | TEMPERATURE: 97.6 F | HEIGHT: 59 IN | BODY MASS INDEX: 26.37 KG/M2

## 2021-08-06 PROCEDURE — 250N000013 HC RX MED GY IP 250 OP 250 PS 637: Performed by: INTERNAL MEDICINE

## 2021-08-06 PROCEDURE — 250N000013 HC RX MED GY IP 250 OP 250 PS 637: Performed by: PHYSICIAN ASSISTANT

## 2021-08-06 PROCEDURE — 99239 HOSP IP/OBS DSCHRG MGMT >30: CPT | Performed by: INTERNAL MEDICINE

## 2021-08-06 RX ADMIN — FERROUS GLUCONATE 324 MG: 324 TABLET ORAL at 09:45

## 2021-08-06 RX ADMIN — ASPIRIN 325 MG: 325 TABLET, COATED ORAL at 09:45

## 2021-08-06 RX ADMIN — ALLOPURINOL 100 MG: 100 TABLET ORAL at 09:45

## 2021-08-06 RX ADMIN — BRIMONIDINE TARTRATE 1 DROP: 2 SOLUTION OPHTHALMIC at 09:50

## 2021-08-06 RX ADMIN — Medication 1 TABLET: at 09:45

## 2021-08-06 RX ADMIN — ACETAMINOPHEN 650 MG: 325 TABLET, FILM COATED ORAL at 15:29

## 2021-08-06 RX ADMIN — TRAMADOL HYDROCHLORIDE 50 MG: 50 TABLET, FILM COATED ORAL at 05:54

## 2021-08-06 RX ADMIN — CYANOCOBALAMIN TAB 1000 MCG 1000 MCG: 1000 TAB at 12:58

## 2021-08-06 RX ADMIN — SENNOSIDES AND DOCUSATE SODIUM 1 TABLET: 8.6; 5 TABLET ORAL at 09:44

## 2021-08-06 RX ADMIN — CALCIUM CARBONATE 600 MG (1,500 MG)-VITAMIN D3 400 UNIT TABLET 1 TABLET: at 09:45

## 2021-08-06 RX ADMIN — OMEPRAZOLE 20 MG: 20 CAPSULE, DELAYED RELEASE ORAL at 12:58

## 2021-08-06 RX ADMIN — AMLODIPINE BESYLATE 5 MG: 5 TABLET ORAL at 09:44

## 2021-08-06 RX ADMIN — FLUOXETINE HYDROCHLORIDE 20 MG: 20 CAPSULE ORAL at 09:45

## 2021-08-06 ASSESSMENT — ACTIVITIES OF DAILY LIVING (ADL)
ADLS_ACUITY_SCORE: 20
ADLS_ACUITY_SCORE: 25
ADLS_ACUITY_SCORE: 20

## 2021-08-06 NOTE — PLAN OF CARE
Pt pleasantly confused; A&O to self only. VSS; CMS intact. Dressing on L hip with scant amount of dried drainage. Pedal pulses palpable. Denies pain except with activity. Tylenol and ice pack provided for comfort. Tolerating PO. Incontinent of urine/ purewick utilized. Discharge orders received; instructions reviewed with pt and daughter ( ). A copy of discharge AVS given to pt. Belongings returned. Pt dischrged from floor via w/c accompanied by ealth transport to TCU. Discharge paperwork/ envelope sent with pt.

## 2021-08-06 NOTE — PLAN OF CARE
Patient vital signs are at baseline: Yes, VSS on RA  Patient able to ambulate as they were prior to admission or with assist devices provided by therapies during their stay:  No,  Reason:  needs assist of 2 w/ GB & walker to ambulate   Patient MUST void prior to discharge:  Yes, new PW placed   Patient able to tolerate oral intake:  Yes  Pain has adequate pain control using Oral analgesics:  Yes denies pain    AO to self, refused meds and full assessment this shift. Turned & repositioned Q2H. Discharge to TCU pending placement.

## 2021-08-06 NOTE — PLAN OF CARE
Pt alert to self. Turned and repo during the overnight.   Purewick in place, Ultram given for pain. Will continue to monitor.

## 2021-08-06 NOTE — PROGRESS NOTES
Care Management Discharge Note    Discharge Date: 08/06/2021       Discharge Disposition: Transitional Care, Skilled Nursing Facilty    Discharge Services: Transportation Services    Discharge DME: None    Discharge Transportation: agency    Private pay costs discussed: Not applicable    PAS Confirmation Code: [id number]  Patient/family educated on Medicare website which has current facility and service quality ratings: no    Education Provided on the Discharge Plan:    Persons Notified of Discharge Plans: facility  Patient/Family in Agreement with the Plan: yes    Handoff Referral Completed: No    Additional Information:  [facility] w[facility]c transport at 4pm. Orders faxed.    PAS-RR    D: Per DHS regulation, SW completed and submitted PAS-RR to [place] [employer] Direct Connect via the Senior LinkAge Line.  PAS-RR confirmation # is : [id number]    I: SW spoke with pt and they are aware a PAS-RR has been submitted.  SW reviewed with pt that they may be contacted for a follow up appointment within 10 days of hospital discharge if their SNF stay is < 30 days.  Contact information for [facility] LinkAge Line was also provided.    A: pt verbalized understanding.    P: Further questions may be directed to [facility] LinkAge Line at #[phone], option #4 for PAS-RR staff.    FLOR Johnson

## 2021-08-06 NOTE — PROGRESS NOTES
Pt refusing medication. Approached x 3 and explained that she should at least take Donepezil. Pt refuses anyway. Will hold pills  and attempt again.

## 2021-08-07 NOTE — PLAN OF CARE
Physical Therapy Discharge Summary    Reason for therapy discharge:    Discharged to transitional care facility.    Progress towards therapy goal(s). See goals on Care Plan in [facility] electronic health record for goal details.  Goals partially met.  Barriers to achieving goals:   discharge from facility.    Therapy recommendation(s):    Continued therapy is recommended.  Rationale/Recommendations:  cont PT at TCU to optimize functional recovery.

## 2021-08-26 ENCOUNTER — DOCUMENTATION ONLY (OUTPATIENT)
Dept: OTHER | Facility: CLINIC | Age: 86
End: 2021-08-26

## 2021-09-10 ENCOUNTER — LAB REQUISITION (OUTPATIENT)
Dept: LAB | Facility: CLINIC | Age: 86
End: 2021-09-10
Payer: COMMERCIAL

## 2021-09-10 DIAGNOSIS — R05.9 COUGH: ICD-10-CM

## 2021-09-10 DIAGNOSIS — E87.1 HYPO-OSMOLALITY AND HYPONATREMIA: ICD-10-CM

## 2021-09-11 LAB
ANION GAP SERPL CALCULATED.3IONS-SCNC: 11 MMOL/L (ref 5–18)
BUN SERPL-MCNC: 21 MG/DL (ref 8–28)
CALCIUM SERPL-MCNC: 8 MG/DL (ref 8.5–10.5)
CHLORIDE BLD-SCNC: 105 MMOL/L (ref 98–107)
CO2 SERPL-SCNC: 29 MMOL/L (ref 22–31)
CREAT SERPL-MCNC: 0.65 MG/DL (ref 0.6–1.1)
ERYTHROCYTE [DISTWIDTH] IN BLOOD BY AUTOMATED COUNT: 12.7 % (ref 10–15)
GFR SERPL CREATININE-BSD FRML MDRD: 77 ML/MIN/1.73M2
GLUCOSE BLD-MCNC: 84 MG/DL (ref 70–125)
HCT VFR BLD AUTO: 37.5 % (ref 35–47)
HGB BLD-MCNC: 11.8 G/DL (ref 11.7–15.7)
MCH RBC QN AUTO: 28.6 PG (ref 26.5–33)
MCHC RBC AUTO-ENTMCNC: 31.5 G/DL (ref 31.5–36.5)
MCV RBC AUTO: 91 FL (ref 78–100)
PLATELET # BLD AUTO: 138 10E3/UL (ref 150–450)
POTASSIUM BLD-SCNC: 3.7 MMOL/L (ref 3.5–5)
RBC # BLD AUTO: 4.12 10E6/UL (ref 3.8–5.2)
SODIUM SERPL-SCNC: 145 MMOL/L (ref 136–145)
WBC # BLD AUTO: 4 10E3/UL (ref 4–11)

## 2021-09-11 PROCEDURE — 80048 BASIC METABOLIC PNL TOTAL CA: CPT | Mod: ORL | Performed by: FAMILY MEDICINE

## 2021-09-11 PROCEDURE — P9603 ONE-WAY ALLOW PRORATED MILES: HCPCS | Mod: ORL | Performed by: FAMILY MEDICINE

## 2021-09-11 PROCEDURE — 85027 COMPLETE CBC AUTOMATED: CPT | Mod: ORL | Performed by: FAMILY MEDICINE

## 2021-09-11 PROCEDURE — 36415 COLL VENOUS BLD VENIPUNCTURE: CPT | Mod: ORL | Performed by: FAMILY MEDICINE

## 2021-09-15 ENCOUNTER — LAB REQUISITION (OUTPATIENT)
Dept: LAB | Facility: CLINIC | Age: 86
End: 2021-09-15
Payer: COMMERCIAL

## 2021-09-15 DIAGNOSIS — J18.9 PNEUMONIA, UNSPECIFIED ORGANISM: ICD-10-CM

## 2021-09-16 LAB
ANION GAP SERPL CALCULATED.3IONS-SCNC: 12 MMOL/L (ref 5–18)
BUN SERPL-MCNC: 34 MG/DL (ref 8–28)
CALCIUM SERPL-MCNC: 8.5 MG/DL (ref 8.5–10.5)
CHLORIDE BLD-SCNC: 104 MMOL/L (ref 98–107)
CO2 SERPL-SCNC: 29 MMOL/L (ref 22–31)
CREAT SERPL-MCNC: 0.76 MG/DL (ref 0.6–1.1)
ERYTHROCYTE [DISTWIDTH] IN BLOOD BY AUTOMATED COUNT: 13.2 % (ref 10–15)
GFR SERPL CREATININE-BSD FRML MDRD: 68 ML/MIN/1.73M2
GLUCOSE BLD-MCNC: 97 MG/DL (ref 70–125)
HCT VFR BLD AUTO: 38.6 % (ref 35–47)
HGB BLD-MCNC: 12.2 G/DL (ref 11.7–15.7)
MCH RBC QN AUTO: 28.7 PG (ref 26.5–33)
MCHC RBC AUTO-ENTMCNC: 31.6 G/DL (ref 31.5–36.5)
MCV RBC AUTO: 91 FL (ref 78–100)
PLATELET # BLD AUTO: 238 10E3/UL (ref 150–450)
POTASSIUM BLD-SCNC: 3.3 MMOL/L (ref 3.5–5)
RBC # BLD AUTO: 4.25 10E6/UL (ref 3.8–5.2)
SODIUM SERPL-SCNC: 145 MMOL/L (ref 136–145)
WBC # BLD AUTO: 8.4 10E3/UL (ref 4–11)

## 2021-09-16 PROCEDURE — 80048 BASIC METABOLIC PNL TOTAL CA: CPT | Mod: ORL | Performed by: FAMILY MEDICINE

## 2021-09-16 PROCEDURE — 36415 COLL VENOUS BLD VENIPUNCTURE: CPT | Mod: ORL | Performed by: FAMILY MEDICINE

## 2021-09-16 PROCEDURE — P9603 ONE-WAY ALLOW PRORATED MILES: HCPCS | Mod: ORL | Performed by: FAMILY MEDICINE

## 2021-09-16 PROCEDURE — 85027 COMPLETE CBC AUTOMATED: CPT | Mod: ORL | Performed by: FAMILY MEDICINE

## 2021-09-20 ENCOUNTER — LAB REQUISITION (OUTPATIENT)
Dept: LAB | Facility: CLINIC | Age: 86
End: 2021-09-20
Payer: COMMERCIAL

## 2021-09-20 DIAGNOSIS — E87.6 HYPOKALEMIA: ICD-10-CM

## 2021-09-21 ENCOUNTER — LAB REQUISITION (OUTPATIENT)
Dept: LAB | Facility: CLINIC | Age: 86
End: 2021-09-21
Payer: COMMERCIAL

## 2021-09-21 DIAGNOSIS — E87.6 HYPOKALEMIA: ICD-10-CM

## 2021-09-21 LAB — POTASSIUM BLD-SCNC: 3.1 MMOL/L (ref 3.5–5)

## 2021-09-21 PROCEDURE — 36415 COLL VENOUS BLD VENIPUNCTURE: CPT | Mod: ORL | Performed by: FAMILY MEDICINE

## 2021-09-21 PROCEDURE — P9604 ONE-WAY ALLOW PRORATED TRIP: HCPCS | Mod: ORL | Performed by: FAMILY MEDICINE

## 2021-09-21 PROCEDURE — 84132 ASSAY OF SERUM POTASSIUM: CPT | Mod: ORL | Performed by: FAMILY MEDICINE

## 2021-09-22 LAB
ANION GAP SERPL CALCULATED.3IONS-SCNC: 14 MMOL/L (ref 5–18)
BUN SERPL-MCNC: 24 MG/DL (ref 8–28)
CALCIUM SERPL-MCNC: 8.3 MG/DL (ref 8.5–10.5)
CHLORIDE BLD-SCNC: 103 MMOL/L (ref 98–107)
CO2 SERPL-SCNC: 26 MMOL/L (ref 22–31)
CREAT SERPL-MCNC: 0.68 MG/DL (ref 0.6–1.1)
GFR SERPL CREATININE-BSD FRML MDRD: 76 ML/MIN/1.73M2
GLUCOSE BLD-MCNC: 64 MG/DL (ref 70–125)
POTASSIUM BLD-SCNC: 3.2 MMOL/L (ref 3.5–5)
SODIUM SERPL-SCNC: 143 MMOL/L (ref 136–145)

## 2021-09-22 PROCEDURE — P9604 ONE-WAY ALLOW PRORATED TRIP: HCPCS | Mod: ORL | Performed by: FAMILY MEDICINE

## 2021-09-22 PROCEDURE — 80048 BASIC METABOLIC PNL TOTAL CA: CPT | Mod: ORL | Performed by: FAMILY MEDICINE

## 2021-09-22 PROCEDURE — 36415 COLL VENOUS BLD VENIPUNCTURE: CPT | Mod: ORL | Performed by: FAMILY MEDICINE

## (undated) DEVICE — GLOVE PROTEXIS BLUE W/NEU-THERA 8.0  2D73EB80

## (undated) DEVICE — ESU GROUND PAD UNIVERSAL W/O CORD

## (undated) DEVICE — MANIFOLD NEPTUNE 4 PORT 700-20

## (undated) DEVICE — GLOVE PROTEXIS MICRO 8.0  2D73PM80

## (undated) DEVICE — SOL NACL 0.9% IRRIG 1000ML BOTTLE 2F7124

## (undated) DEVICE — SU VICRYL 0 CT-1 27" J340H

## (undated) DEVICE — SOL WATER IRRIG 1000ML BOTTLE 2F7114

## (undated) DEVICE — SU VICRYL 2-0 CP-1 27" UND J266H

## (undated) DEVICE — GLOVE PROTEXIS W/NEU-THERA 8.0  2D73TE80

## (undated) DEVICE — GLOVE PROTEXIS W/NEU-THERA 7.5  2D73TE75

## (undated) DEVICE — SUCTION MANIFOLD NEPTUNE SGL

## (undated) DEVICE — PACK HIP NAILING SOP15HNSB

## (undated) DEVICE — PIN GUIDE 2.5X230MM 900.723

## (undated) DEVICE — DRILL BIT 3.2X145MM  310.31

## (undated) DEVICE — LINEN TOWEL PACK X5 5464

## (undated) DEVICE — DRSG AQUACEL AG 3.5X6.0" HYDROFIBER 412010

## (undated) RX ORDER — TRANEXAMIC ACID 10 MG/ML
INJECTION, SOLUTION INTRAVENOUS
Status: DISPENSED
Start: 2021-07-27

## (undated) RX ORDER — FENTANYL CITRATE 0.05 MG/ML
INJECTION, SOLUTION INTRAMUSCULAR; INTRAVENOUS
Status: DISPENSED
Start: 2021-07-27

## (undated) RX ORDER — ONDANSETRON 2 MG/ML
INJECTION INTRAMUSCULAR; INTRAVENOUS
Status: DISPENSED
Start: 2021-07-27

## (undated) RX ORDER — LIDOCAINE HYDROCHLORIDE 20 MG/ML
INJECTION, SOLUTION EPIDURAL; INFILTRATION; INTRACAUDAL; PERINEURAL
Status: DISPENSED
Start: 2021-07-27

## (undated) RX ORDER — CLINDAMYCIN PHOSPHATE 900 MG/50ML
INJECTION, SOLUTION INTRAVENOUS
Status: DISPENSED
Start: 2021-07-27

## (undated) RX ORDER — FENTANYL CITRATE 50 UG/ML
INJECTION, SOLUTION INTRAMUSCULAR; INTRAVENOUS
Status: DISPENSED
Start: 2021-07-27

## (undated) RX ORDER — DEXAMETHASONE SODIUM PHOSPHATE 4 MG/ML
INJECTION, SOLUTION INTRA-ARTICULAR; INTRALESIONAL; INTRAMUSCULAR; INTRAVENOUS; SOFT TISSUE
Status: DISPENSED
Start: 2021-07-27